# Patient Record
Sex: MALE | Race: WHITE | Employment: FULL TIME | URBAN - METROPOLITAN AREA
[De-identification: names, ages, dates, MRNs, and addresses within clinical notes are randomized per-mention and may not be internally consistent; named-entity substitution may affect disease eponyms.]

---

## 2020-06-12 ENCOUNTER — OFFICE VISIT (OUTPATIENT)
Dept: FAMILY MEDICINE CLINIC | Age: 40
End: 2020-06-12
Payer: COMMERCIAL

## 2020-06-12 VITALS
RESPIRATION RATE: 12 BRPM | SYSTOLIC BLOOD PRESSURE: 154 MMHG | WEIGHT: 241 LBS | HEART RATE: 88 BPM | TEMPERATURE: 96.8 F | HEIGHT: 70 IN | BODY MASS INDEX: 34.5 KG/M2 | DIASTOLIC BLOOD PRESSURE: 92 MMHG

## 2020-06-12 PROBLEM — I10 ESSENTIAL HYPERTENSION: Status: ACTIVE | Noted: 2020-06-12

## 2020-06-12 PROCEDURE — 99204 OFFICE O/P NEW MOD 45 MIN: CPT | Performed by: NURSE PRACTITIONER

## 2020-06-12 RX ORDER — HYDROXYZINE PAMOATE 25 MG/1
25 CAPSULE ORAL 3 TIMES DAILY PRN
Qty: 30 CAPSULE | Refills: 0 | Status: SHIPPED | OUTPATIENT
Start: 2020-06-12 | End: 2020-06-24 | Stop reason: SDUPTHER

## 2020-06-12 RX ORDER — LISINOPRIL 10 MG/1
10 TABLET ORAL DAILY
Qty: 30 TABLET | Refills: 3 | Status: SHIPPED | OUTPATIENT
Start: 2020-06-12 | End: 2020-09-09 | Stop reason: SDUPTHER

## 2020-06-12 RX ORDER — DULOXETIN HYDROCHLORIDE 30 MG/1
30 CAPSULE, DELAYED RELEASE ORAL DAILY
Qty: 30 CAPSULE | Refills: 1 | Status: SHIPPED | OUTPATIENT
Start: 2020-06-12 | End: 2020-07-10 | Stop reason: SINTOL

## 2020-06-12 RX ORDER — LISINOPRIL 10 MG/1
TABLET ORAL
COMMUNITY
Start: 2020-05-08 | End: 2020-06-12

## 2020-06-12 SDOH — HEALTH STABILITY: MENTAL HEALTH: HOW OFTEN DO YOU HAVE A DRINK CONTAINING ALCOHOL?: NEVER

## 2020-06-12 ASSESSMENT — ENCOUNTER SYMPTOMS
EYES NEGATIVE: 1
SHORTNESS OF BREATH: 0
BLOOD IN STOOL: 0
BACK PAIN: 1
CHEST TIGHTNESS: 0
ABDOMINAL PAIN: 0

## 2020-06-12 NOTE — PROGRESS NOTES
sounds: Normal breath sounds. Abdominal:      General: Bowel sounds are normal.      Palpations: Abdomen is soft. Musculoskeletal: Normal range of motion. Thoracic back: He exhibits tenderness. Lumbar back: He exhibits tenderness. Skin:     General: Skin is warm and dry. Neurological:      Mental Status: He is alert and oriented to person, place, and time. Deep Tendon Reflexes: Reflexes are normal and symmetric. Psychiatric:         Behavior: Behavior normal.         Thought Content: Thought content normal.         Judgment: Judgment normal.         There is no immunization history on file for this patient. Health Maintenance   Topic Date Due    Potassium monitoring  1981    Creatinine monitoring  1981    Varicella vaccine (1 of 2 - 2-dose childhood series) 07/24/1982    Pneumococcal 0-64 years Vaccine (1 of 1 - PPSV23) 07/24/1987    HIV screen  07/24/1996    DTaP/Tdap/Td vaccine (1 - Tdap) 07/24/2000    Flu vaccine (Season Ended) 09/01/2020    Hepatitis A vaccine  Aged Out    Hepatitis B vaccine  Aged Out    Hib vaccine  Aged Out    Meningococcal (ACWY) vaccine  Aged Out         ASSESSMENT      1. Essential hypertension    2. Lumbar pain    3. Thoracic spine pain    4. Depression, unspecified depression type    5. Anxiety    6. Screening, lipid    7.  Fatigue, unspecified type            PLAN       Requested Prescriptions     Signed Prescriptions Disp Refills    hydrOXYzine (VISTARIL) 25 MG capsule 30 capsule 0     Sig: Take 1 capsule by mouth 3 times daily as needed for Anxiety    DULoxetine (CYMBALTA) 30 MG extended release capsule 30 capsule 1     Sig: Take 1 capsule by mouth daily    lisinopril (PRINIVIL;ZESTRIL) 10 MG tablet 30 tablet 3     Sig: Take 1 tablet by mouth daily       Orders Placed This Encounter   Procedures    CBC Auto Differential     Standing Status:   Future     Standing Expiration Date:   6/12/2021    Basic Metabolic Panel     Standing

## 2020-06-12 NOTE — PATIENT INSTRUCTIONS
your doctor right away if you have sudden skin redness or a rash that spreads and causes white or yellow pustules, blistering, or peeling. Stop using hydroxyzine and call your doctor at once if you have:  · fast or pounding heartbeats;  · headache with chest pain;  · severe dizziness, fainting; or  · a seizure (convulsions). Side effects such as drowsiness and confusion may be more likely in older adults. Common side effects may include:  · drowsiness;  · headache;  · dry mouth; or  · skin rash. This is not a complete list of side effects and others may occur. Call your doctor for medical advice about side effects. You may report side effects to FDA at 5-547-SFF-1815. What other drugs will affect hydroxyzine? Taking this medicine with other drugs that make you sleepy can worsen this effect. Ask your doctor before taking hydroxyzine with a sleeping pill, narcotic pain medicine, muscle relaxer, or medicine for anxiety, depression, or seizures. Hydroxyzine can cause a serious heart problem, especially if you use certain medicines at the same time, including antibiotics, antidepressants, heart rhythm medicine, antipsychotic medicines, and medicines to treat cancer, malaria, HIV or AIDS. Tell your doctor about all medicines you use, and those you start or stop using during your treatment with hydroxyzine. Other drugs may interact with hydroxyzine, including prescription and over-the-counter medicines, vitamins, and herbal products. Not all possible interactions are listed here. Tell each of your health care providers about all medicines you use now and any medicine you start or stop using. Where can I get more information? Your pharmacist can provide more information about hydroxyzine. Remember, keep this and all other medicines out of the reach of children, never share your medicines with others, and use this medication only for the indication prescribed.    Every effort has been made to ensure that the

## 2020-06-17 ENCOUNTER — NURSE ONLY (OUTPATIENT)
Dept: LAB | Age: 40
End: 2020-06-17

## 2020-06-17 LAB
ANION GAP SERPL CALCULATED.3IONS-SCNC: 11 MEQ/L (ref 8–16)
BASOPHILS # BLD: 0.9 %
BASOPHILS ABSOLUTE: 0.1 THOU/MM3 (ref 0–0.1)
BUN BLDV-MCNC: 13 MG/DL (ref 7–22)
CALCIUM SERPL-MCNC: 8.8 MG/DL (ref 8.5–10.5)
CHLORIDE BLD-SCNC: 102 MEQ/L (ref 98–111)
CHOLESTEROL, TOTAL: 226 MG/DL (ref 100–199)
CO2: 25 MEQ/L (ref 23–33)
CREAT SERPL-MCNC: 1.1 MG/DL (ref 0.4–1.2)
EOSINOPHIL # BLD: 3.5 %
EOSINOPHILS ABSOLUTE: 0.5 THOU/MM3 (ref 0–0.4)
ERYTHROCYTE [DISTWIDTH] IN BLOOD BY AUTOMATED COUNT: 14.7 % (ref 11.5–14.5)
ERYTHROCYTE [DISTWIDTH] IN BLOOD BY AUTOMATED COUNT: 49 FL (ref 35–45)
GFR SERPL CREATININE-BSD FRML MDRD: 75 ML/MIN/1.73M2
GLUCOSE BLD-MCNC: 87 MG/DL (ref 70–108)
HCT VFR BLD CALC: 50.2 % (ref 42–52)
HDLC SERPL-MCNC: 46 MG/DL
HEMOGLOBIN: 16.2 GM/DL (ref 14–18)
IMMATURE GRANS (ABS): 0.04 THOU/MM3 (ref 0–0.07)
IMMATURE GRANULOCYTES: 0.3 %
LDL CHOLESTEROL CALCULATED: 135 MG/DL
LYMPHOCYTES # BLD: 31.5 %
LYMPHOCYTES ABSOLUTE: 4.3 THOU/MM3 (ref 1–4.8)
MCH RBC QN AUTO: 29.1 PG (ref 26–33)
MCHC RBC AUTO-ENTMCNC: 32.3 GM/DL (ref 32.2–35.5)
MCV RBC AUTO: 90.1 FL (ref 80–94)
MONOCYTES # BLD: 7.1 %
MONOCYTES ABSOLUTE: 1 THOU/MM3 (ref 0.4–1.3)
NUCLEATED RED BLOOD CELLS: 0 /100 WBC
PLATELET # BLD: 320 THOU/MM3 (ref 130–400)
PMV BLD AUTO: 10.3 FL (ref 9.4–12.4)
POTASSIUM SERPL-SCNC: 4.1 MEQ/L (ref 3.5–5.2)
RBC # BLD: 5.57 MILL/MM3 (ref 4.7–6.1)
SEG NEUTROPHILS: 56.7 %
SEGMENTED NEUTROPHILS ABSOLUTE COUNT: 7.7 THOU/MM3 (ref 1.8–7.7)
SODIUM BLD-SCNC: 138 MEQ/L (ref 135–145)
T4 FREE: 1.28 NG/DL (ref 0.93–1.76)
TRIGL SERPL-MCNC: 225 MG/DL (ref 0–199)
TSH SERPL DL<=0.05 MIU/L-ACNC: 2.54 UIU/ML (ref 0.4–4.2)
WBC # BLD: 13.5 THOU/MM3 (ref 4.8–10.8)

## 2020-06-18 ENCOUNTER — TELEPHONE (OUTPATIENT)
Dept: FAMILY MEDICINE CLINIC | Age: 40
End: 2020-06-18

## 2020-06-22 RX ORDER — HYDROXYZINE PAMOATE 25 MG/1
25 CAPSULE ORAL 3 TIMES DAILY PRN
Qty: 30 CAPSULE | Refills: 0 | OUTPATIENT
Start: 2020-06-22 | End: 2020-07-06

## 2020-06-22 NOTE — TELEPHONE ENCOUNTER
Please double check that patient needs a refill. I sent this in just 10 days ago. He can take it up to 3 times daily, but if he is actually needing it 3 times every day, we need to get him on something that will help control it better.  Thanks, TS

## 2020-06-23 ENCOUNTER — PATIENT MESSAGE (OUTPATIENT)
Dept: FAMILY MEDICINE CLINIC | Age: 40
End: 2020-06-23

## 2020-06-24 RX ORDER — HYDROXYZINE PAMOATE 25 MG/1
25 CAPSULE ORAL 3 TIMES DAILY PRN
Qty: 30 CAPSULE | Refills: 0 | Status: SHIPPED | OUTPATIENT
Start: 2020-06-24 | End: 2020-07-08

## 2020-07-02 ENCOUNTER — NURSE ONLY (OUTPATIENT)
Dept: LAB | Age: 40
End: 2020-07-02

## 2020-07-02 LAB
BASOPHILS # BLD: 1.1 %
BASOPHILS ABSOLUTE: 0.1 THOU/MM3 (ref 0–0.1)
EOSINOPHIL # BLD: 4.4 %
EOSINOPHILS ABSOLUTE: 0.5 THOU/MM3 (ref 0–0.4)
ERYTHROCYTE [DISTWIDTH] IN BLOOD BY AUTOMATED COUNT: 14.6 % (ref 11.5–14.5)
ERYTHROCYTE [DISTWIDTH] IN BLOOD BY AUTOMATED COUNT: 48.7 FL (ref 35–45)
HCT VFR BLD CALC: 50.9 % (ref 42–52)
HEMOGLOBIN: 16.1 GM/DL (ref 14–18)
IMMATURE GRANS (ABS): 0.05 THOU/MM3 (ref 0–0.07)
IMMATURE GRANULOCYTES: 0.4 %
LYMPHOCYTES # BLD: 30.3 %
LYMPHOCYTES ABSOLUTE: 3.7 THOU/MM3 (ref 1–4.8)
MCH RBC QN AUTO: 28.9 PG (ref 26–33)
MCHC RBC AUTO-ENTMCNC: 31.6 GM/DL (ref 32.2–35.5)
MCV RBC AUTO: 91.2 FL (ref 80–94)
MONOCYTES # BLD: 7.8 %
MONOCYTES ABSOLUTE: 1 THOU/MM3 (ref 0.4–1.3)
NUCLEATED RED BLOOD CELLS: 0 /100 WBC
PLATELET # BLD: 335 THOU/MM3 (ref 130–400)
PMV BLD AUTO: 9.9 FL (ref 9.4–12.4)
RBC # BLD: 5.58 MILL/MM3 (ref 4.7–6.1)
SEG NEUTROPHILS: 56 %
SEGMENTED NEUTROPHILS ABSOLUTE COUNT: 6.8 THOU/MM3 (ref 1.8–7.7)
WBC # BLD: 12.2 THOU/MM3 (ref 4.8–10.8)

## 2020-07-06 RX ORDER — LISINOPRIL 10 MG/1
10 TABLET ORAL DAILY
Qty: 30 TABLET | Refills: 3 | Status: CANCELLED | OUTPATIENT
Start: 2020-07-06

## 2020-07-06 RX ORDER — DULOXETIN HYDROCHLORIDE 30 MG/1
30 CAPSULE, DELAYED RELEASE ORAL DAILY
Qty: 30 CAPSULE | Refills: 1 | Status: CANCELLED | OUTPATIENT
Start: 2020-07-06

## 2020-07-06 NOTE — TELEPHONE ENCOUNTER
Request sent via [facility] for refill on cymbalta 30 mg qd and lisinopril 10 mg qd. Last seen 6/12/20, next apt 7/10/20. Message sent back to patient via [facility] to let him know that he should have refills on both at the pharmacy.

## 2020-07-07 ENCOUNTER — TELEPHONE (OUTPATIENT)
Dept: FAMILY MEDICINE CLINIC | Age: 40
End: 2020-07-07

## 2020-07-10 ENCOUNTER — VIRTUAL VISIT (OUTPATIENT)
Dept: FAMILY MEDICINE CLINIC | Age: 40
End: 2020-07-10
Payer: COMMERCIAL

## 2020-07-10 PROCEDURE — 99213 OFFICE O/P EST LOW 20 MIN: CPT | Performed by: NURSE PRACTITIONER

## 2020-07-10 RX ORDER — PAROXETINE HYDROCHLORIDE 20 MG/1
20 TABLET, FILM COATED ORAL DAILY
Qty: 30 TABLET | Refills: 1 | Status: SHIPPED | OUTPATIENT
Start: 2020-07-10 | End: 2020-08-13

## 2020-07-10 ASSESSMENT — ENCOUNTER SYMPTOMS
BACK PAIN: 1
SHORTNESS OF BREATH: 0
BLOOD IN STOOL: 0
EYES NEGATIVE: 1
CHEST TIGHTNESS: 0
ABDOMINAL PAIN: 0

## 2020-07-10 NOTE — PATIENT INSTRUCTIONS
Weaning Cymbalta: take 1 capsule every other day x1 week, then every 3 days x1 week, then stop. Okay to start Paxil right away. Patient Education        paroxetine  Pronunciation:  glenn GORE a teen  Brand:  Brisdelle, Paxil, Rina MAHAN, Kenney Jacobson  What is the most important information I should know about paroxetine? You should not use paroxetine if you are also taking pimozide or thioridazine. Do not use paroxetine within 14 days before or 14 days after you have used an MAO inhibitor, such as isocarboxazid, linezolid, methylene blue injection, phenelzine, rasagiline, selegiline, or tranylcypromine. Some young people have thoughts about suicide when first taking an antidepressant. Stay alert to changes in your mood or symptoms. Report any new or worsening symptoms to your doctor  Seek medical attention right away if you have symptoms such as: agitation, hallucinations, muscle stiffness, twitching, loss of coordination, dizziness, warmth or tingly feeling, nausea, vomiting, diarrhea, fever, sweating, tremors, racing heartbeats, or a seizure (convulsions). What is paroxetine? Paroxetine is a selective serotonin reuptake inhibitor (SSRI) antidepressant. Paroxetine is used to treat depression, including major depressive disorder. Paroxtine is also used to treat panic disorder, obsessive-compulsive disorder (OCD), anxiety disorders, post-traumatic stress disorder (PTSD), and premenstrual dysphoric disorder (PMDD). The Brisdelle brand of paroxetine is used to treat hot flashes related to menopause. Tyrone Elizabeth is not for treating any other conditions. Paroxetine may also be used for purposes not listed in this medication guide. What should I discuss with my healthcare provider before taking paroxetine? You should not use this medicine if you are allergic to paroxetine, or if you are also taking pimozide or thioridazine. Do not use an MAO inhibitor within 14 days before or 14 days after you take paroxetine.  A break it. Shake the oral suspension (liquid) before you measure a dose. Use the dosing syringe provided, or use a medicine dose-measuring device (not a kitchen spoon). It may take up to 4 weeks before your symptoms improve. Keep using the medication as directed and tell your doctor if your symptoms do not improve. Do not stop using paroxetine suddenly, or you could have unpleasant withdrawal symptoms. Ask your doctor how to safely stop using paroxetine. Follow your doctor's instructions about tapering your dose. Store at room temperature away from moisture, heat, and light. What happens if I miss a dose? Take the medicine as soon as you can, but skip the missed dose if it is almost time for your next dose. Do not take two doses at one time. What happens if I overdose? Seek emergency medical attention or call the Poison Help line at 1-263.133.9086. An overdose of paroxetine can be fatal.  What should I avoid while taking paroxetine? Avoid driving or hazardous activity until you know how this medicine will affect you. Your reactions could be impaired. Ask your doctor before taking a nonsteroidal anti-inflammatory drug (NSAID) such as aspirin, ibuprofen (Advil, Motrin), naproxen (Aleve), celecoxib (Celebrex), diclofenac, indomethacin, meloxicam, and others. Using an NSAID with paroxetine may cause you to bruise or bleed easily. Drinking alcohol with this medicine can cause side effects. What are the possible side effects of paroxetine? Get emergency medical help if you have signs of an allergic reaction (hives, difficult breathing, swelling in your face or throat) or a severe skin reaction (fever, sore throat, burning eyes, skin pain, red or purple skin rash with blistering and peeling).   Report any new or worsening symptoms to your doctor, such as: mood or behavior changes, anxiety, panic attacks, trouble sleeping, or if you feel impulsive, irritable, agitated, hostile, aggressive, restless, hyperactive (mentally or physically), more depressed, or have thoughts about suicide or hurting yourself. Call your doctor at once if you have:  · racing thoughts, decreased need for sleep, unusual risk-taking behavior, feelings of extreme happiness or sadness, being more talkative than usual;  · blurred vision, tunnel vision, eye pain or swelling, or seeing halos around lights;  · unusual bone pain or tenderness, swelling or bruising;  · changes in weight or appetite;  · easy bruising, unusual bleeding (nose, mouth, vagina, or rectum), coughing up blood;  · severe nervous system reaction --very stiff (rigid) muscles, high fever, sweating, confusion, fast or uneven heartbeats, tremors, fainting; or  · low levels of sodium in the body --headache, confusion, slurred speech, severe weakness, loss of coordination, feeling unsteady. Seek medical attention right away if you have symptoms of serotonin syndrome, such as: agitation, hallucinations, fever, sweating, shivering, fast heart rate, muscle stiffness, twitching, loss of coordination, nausea, vomiting, or diarrhea. Common side effects may include:  · vision changes;  · weakness, drowsiness, dizziness, tiredness;  · sweating, anxiety, shaking;  · sleep problems (insomnia);  · loss of appetite, nausea, vomiting, diarrhea, constipation;  · dry mouth, yawning;  · infection;  · headache; or  · decreased sex drive, impotence, abnormal ejaculation, or difficulty having an orgasm. This is not a complete list of side effects and others may occur. Call your doctor for medical advice about side effects. You may report side effects to FDA at 5-399-FDA-3990. What other drugs will affect paroxetine? Using paroxetine with other drugs that make you drowsy can worsen this effect. Ask your doctor before using opioid medication, a sleeping pill, a muscle relaxer, or medicine for anxiety or seizures.   Tell your doctor about all your other medicines, especially:  · cimetidine (Tagamet), as a supplement to, and not a substitute for, the expertise, skill, knowledge and judgment of healthcare practitioners. The absence of a warning for a given drug or drug combination in no way should be construed to indicate that the drug or drug combination is safe, effective or appropriate for any given patient. [facility] does not assume any responsibility for any aspect of healthcare administered with the aid of information [facility] provides. The information contained herein is not intended to cover all possible uses, directions, precautions, warnings, drug interactions, allergic reactions, or adverse effects. If you have questions about the drugs you are taking, check with your doctor, nurse or pharmacist.  Copyright 4295-0439 [address]: 27.01. Revision date: 9/19/2019. Care instructions adapted under license by [facility] ([facility]). If you have questions about a medical condition or this instruction, always ask your healthcare professional. [place] [address] any warranty or liability for your use of this information.

## 2020-07-10 NOTE — PROGRESS NOTES
[facility]  [facility]  Dept: [phone]  Dept Fax: [phone]      TELEHEALTH EVALUATION -- Audio/Visual (During DOJVO-27 public health emergency)  This visit was performed via a synchronous telecommunication system. Pt location: Home  Provider location:  Office ([address] [place] [address])  Pt notified that this was a virtual visit and that we will submit a claim for reimbursement to their insurance company. They were made aware that any copays, coinsurance amounts, or other amounts not covered will be their responsibility. Pt accepted? yes    SUBJECTIVE     Sarah Brown is a 46 y.o. male    Pt presents for follow up of htn, depression/anxiety, back pain. Home BP's are monitored about twice daily. Running 130's/70's    Pt was referred to [facility] for chronic back pain. He was told he has DDD by ortho. He will get an MRI on 7/14/20. Pt is here for depression. Pt currently taking Cymbalta and vistaril. Side effects noted: he does have nausea most mornings. He does not feel like the Cymbalta has helped at all. The Vistaril does offer some anxiety relief. Patient Active Problem List   Diagnosis    Essential hypertension       Current Outpatient Medications   Medication Sig Dispense Refill    PARoxetine (PAXIL) 20 MG tablet Take 1 tablet by mouth daily 30 tablet 1    lisinopril (PRINIVIL;ZESTRIL) 10 MG tablet Take 1 tablet by mouth daily 30 tablet 3     No current facility-administered medications for this visit. Review of Systems   Constitutional: Negative for chills, fatigue, fever and unexpected weight change. HENT: Negative. Eyes: Negative. Respiratory: Negative for chest tightness and shortness of breath. Cardiovascular: Negative for chest pain, palpitations and leg swelling. Gastrointestinal: Negative for abdominal pain and blood in stool. Genitourinary: Negative for dysuria. Musculoskeletal: Positive for back pain.  Negative for joint swelling. Skin: Negative for rash. Neurological: Negative for dizziness. Psychiatric/Behavioral: Positive for dysphoric mood. The patient is nervous/anxious. All other systems reviewed and are negative. OBJECTIVE     Due to this being a TeleHealth encounter, evaluation of the following organ systems is limited: Vitals/Constitutional/EENT/Resp/CV/GI//MS/Neuro/Skin/Heme-Lymph-Imm. PHYSICAL EXAMINATION:  [ INSTRUCTIONS:  \"[x]\" Indicates a positive item  \"[]\" Indicates a negative item  -- DELETE ALL ITEMS NOT EXAMINED]  Vital Signs: (All Vital Signs are pt reported, unless otherwise noted)   There were no vitals taken for this visit. Constitutional: [x] Appears well-developed and well-nourished [x] No apparent distress      [] Abnormal-   Mental status  [x] Alert and awake  [x] Oriented to person/place/time [x]Able to follow commands      Eyes:  EOM    [x]  Normal  [] Abnormal-  Sclera  [x]  Normal  [] Abnormal -         Discharge [x]  None visible  [] Abnormal -    HENT:   [x] Normocephalic, atraumatic.   [] Abnormal   [x] Mouth/Throat: Mucous membranes are moist.     External Ears [x] Normal  [] Abnormal-     Neck: [x] No visualized mass     Pulmonary/Chest: [x] Respiratory effort normal.  [x] No visualized signs of difficulty breathing or respiratory distress        [] Abnormal-      Musculoskeletal:   [x] Normal gait with no signs of ataxia         [x] Normal range of motion of neck        [] Abnormal-       Neurological:        [x] No Facial Asymmetry (Cranial nerve 7 motor function) (limited exam to video visit)          [x] No gaze palsy        [] Abnormal-         Skin:        [x] No significant exanthematous lesions or discoloration noted on facial skin         [] Abnormal-            Psychiatric:       [x] Normal Affect [x] No Hallucinations        [] Abnormal-       Component      Latest Ref Rng & Units 7/2/2020 6/17/2020   WBC      4.8 - 10.8 thou/mm3 12.2 (H) 13.5 (H)   RBC 4.70 - 6.10 mill/mm3 5.58 5.57   Hemoglobin Quant      14.0 - 18.0 gm/dl 16.1 16.2   Hematocrit      42.0 - 52.0 % 50.9 50.2   MCV      80.0 - 94.0 fL 91.2 90.1   MCH      26.0 - 33.0 pg 28.9 29.1   MCHC      32.2 - 35.5 gm/dl 31.6 (L) 32.3   RDW-CV      11.5 - 14.5 % 14.6 (H) 14.7 (H)   RDW-SD      35.0 - 45.0 fL 48.7 (H) 49.0 (H)   Platelet Count      470 - 400 thou/mm3 335 320   MPV      9.4 - 12.4 fL 9.9 10.3   Seg Neutrophils      % 56.0 56.7   Lymphocytes      % 30.3 31.5   Monocytes      % 7.8 7.1   Eosinophils      % 4.4 3.5   Basophils      % 1.1 0.9   Immature Granulocytes      % 0.4 0.3   Segs Absolute      1.8 - 7.7 thou/mm3 6.8 7.7   Lymphocytes Absolute      1.0 - 4.8 thou/mm3 3.7 4.3   Monocytes Absolute      0.4 - 1.3 thou/mm3 1.0 1.0   Eosinophils Absolute      0.0 - 0.4 thou/mm3 0.5 (H) 0.5 (H)   Basophils Absolute      0.0 - 0.1 thou/mm3 0.1 0.1   Immature Grans (Abs)      0.00 - 0.07 thou/mm3 0.05 0.04   Nucleated Red Blood Cells      /100 wbc 0 0   Sodium      135 - 145 meq/L  138   Potassium      3.5 - 5.2 meq/L  4.1   Chloride      98 - 111 meq/L  102   CO2      23 - 33 meq/L  25   Glucose      70 - 108 mg/dL  87   BUN      7 - 22 mg/dL  13   Creatinine      0.4 - 1.2 mg/dL  1.1   Calcium      8.5 - 10.5 mg/dL  8.8   Cholesterol, Total      100 - 199 mg/dL  226 (H)   Triglycerides      0 - 199 mg/dL  225 (H)   HDL Cholesterol      mg/dL  46   LDL Calculated      mg/dL  135   TSH      0.400 - 4.20 uIU/mL  2.540   T4 Free      0.93 - 1.76 ng/dL  1.28   Anion Gap      8.0 - 16.0 meq/L  11.0   Est, Glom Filt Rate      ml/min/1.73m2  75 (A)       No results found for: LABA1C  No results found for: EAG    Lab Results   Component Value Date    CHOL 226 (H) 06/17/2020    TRIG 225 (H) 06/17/2020    HDL 46 06/17/2020    LDLCALC 135 06/17/2020       Lab Results   Component Value Date     06/17/2020    K 4.1 06/17/2020     06/17/2020    CO2 25 06/17/2020    BUN 13 06/17/2020    CREATININE 1.1 06/17/2020    GLUCOSE 87 06/17/2020    CALCIUM 8.8 06/17/2020    LABGLOM 75 (A) 06/17/2020       No results found for: LABMICR, [id number]    Lab Results   Component Value Date    TSH 2.540 06/17/2020    T4FREE 1.28 06/17/2020       Lab Results   Component Value Date    WBC 12.2 (H) 07/02/2020    HGB 16.1 07/02/2020    HCT 50.9 07/02/2020    MCV 91.2 07/02/2020     07/02/2020       No results found for: PSA, PSADIA        There is no immunization history on file for this patient. Health Maintenance   Topic Date Due    Varicella vaccine (1 of 2 - 2-dose childhood series) 07/24/1982    Pneumococcal 0-64 years Vaccine (1 of 1 - PPSV23) 07/24/1987    HIV screen  07/24/1996    DTaP/Tdap/Td vaccine (1 - Tdap) 07/24/2000    Flu vaccine (1) 09/01/2020    Potassium monitoring  06/17/2021    Creatinine monitoring  06/17/2021    Hepatitis A vaccine  Aged Out    Hepatitis B vaccine  Aged Out    Hib vaccine  Aged Out    Meningococcal (ACWY) vaccine  Aged Out         No future appointments. ASSESSMENT       Diagnosis Orders   1. Essential hypertension     2. Anxiety     3. Depression, unspecified depression type         PLAN     Will wean Cymbalta  Start Paxil 20 mg daily  CBC to be completed in 3 months   Pt to follow up with Ortho as scheduled  Monitor BP and keep log. Notify office if BP consistently >140/90  Follow up in 4-6 weeks  19}    Pursuant to the emergency declaration under the [facility] waiver authority and the [facility] and [employer] Act, this Virtual  Visit was conducted, with patient's consent, to reduce the patient's risk of exposure to COVID-19 and provide continuity of care for an established patient. Services were provided through a video synchronous discussion virtually to substitute for in-person clinic visit.        Electronically signed by Michalene Ganser, APRN - CNP on 7/10/2020 at 4:15 PM    [place]

## 2020-07-21 ENCOUNTER — PATIENT MESSAGE (OUTPATIENT)
Dept: FAMILY MEDICINE CLINIC | Age: 40
End: 2020-07-21

## 2020-07-21 RX ORDER — NAPROXEN 500 MG/1
500 TABLET ORAL 2 TIMES DAILY WITH MEALS
Qty: 60 TABLET | Refills: 1 | Status: SHIPPED | OUTPATIENT
Start: 2020-07-21 | End: 2020-09-09 | Stop reason: SDUPTHER

## 2020-07-21 RX ORDER — HYDROXYZINE PAMOATE 50 MG/1
50 CAPSULE ORAL 3 TIMES DAILY PRN
Qty: 30 CAPSULE | Refills: 1 | Status: SHIPPED | OUTPATIENT
Start: 2020-07-21 | End: 2020-08-12

## 2020-07-21 NOTE — TELEPHONE ENCOUNTER
From: Leticia Alaniz  To: David Bee, APRN - CNP  Sent: 7/21/2020 9:46 AM EDT  Subject: Prescription Question    Good morning,  I was wanting to see if there is a different medication we can try for the anxiety and depression or possibly a stronger dosage. Im not sure how all of that works but thaw new medication doesn't seem to help me much. Also the orthopedic doctor was supposed to schedule me an appointment to get some type of injections but I'm struggling with my day to day pain now. Is there anything you can suggest or prescribe until I get set up for these injections or procedures.    Thank you for your time and may you have a blessed day

## 2020-07-21 NOTE — TELEPHONE ENCOUNTER
Brendon,    I believe you were just switched to Paxil on 7/10/20. It does take about 4-6 weeks to get full effects of the medication. Are you starting to notice any difference yet? We can switch to another medication if you like, but it will be the same 4-6 week window to get full effects. I will increase your vistaril from 25 mg to 50 mg as needed. Hopefully this will help until the other medication can fully kick in. If you decide to stick with the Paxil, update me on how you are feeling in about 2 weeks. For your pain, let me send in Naprosyn 500 mg twice daily as needed for pain. Do not take any Aleve or ibuprofen while taking Naprosyn, but tylenol would be fine. Medications were sent to [facility] on [address], [address]. Have a great day!     Clover Wall

## 2020-07-27 ENCOUNTER — TELEPHONE (OUTPATIENT)
Dept: PHYSICAL MEDICINE AND REHAB | Age: 40
End: 2020-07-27

## 2020-07-28 ENCOUNTER — TELEMEDICINE (OUTPATIENT)
Dept: PHYSICAL MEDICINE AND REHAB | Age: 40
End: 2020-07-28
Payer: COMMERCIAL

## 2020-07-28 PROCEDURE — 99203 OFFICE O/P NEW LOW 30 MIN: CPT | Performed by: PHYSICAL MEDICINE & REHABILITATION

## 2020-07-28 NOTE — PROGRESS NOTES
Pain Management Consultation    [address] [facility] [address]  [facility] [address]  Dept: [phone]  [facility] [address] [place] [address], [address] [place] [address]       Emily Scanlon is a 45 y.o. male, who came today due to  back pain    Cause of the symptom(s): home injury - fell off the roof     Onset: chronic     Quality of Symptoms: stabbing , aching and throbbing    Aggravating factors: twisting and bending forward , sneezing    Relieving factors: hot shower     Treatment done: acupuncture, massotherapy, physical therapy, anti-inflammatory medication and muscle relaxant    Current pain level: 7 on the scale of 0-10 ( 10 being worst)       No Known Allergies    Social History     Socioeconomic History    Marital status:      Spouse name: Not on file    Number of children: Not on file    Years of education: Not on file    Highest education level: Not on file   Occupational History    Not on file   Social Needs    Financial resource strain: Not on file    Food insecurity     Worry: Not on file     Inability: Not on file    Transportation needs     Medical: Not on file     Non-medical: Not on file   Tobacco Use    Smoking status: Current Every Day Smoker     Packs/day: 1.00     Types: Cigarettes    Smokeless tobacco: Never Used   Substance and Sexual Activity    Alcohol use: Never     Frequency: Never    Drug use: Never    Sexual activity: Not on file   Lifestyle    Physical activity     Days per week: Not on file     Minutes per session: Not on file    Stress: Not on file   Relationships    Social connections     Talks on phone: Not on file     Gets together: Not on file     Attends [employer]ist service: Not on file     Active member of club or organization: Not on file     Attends meetings of clubs or organizations: Not on file     Relationship status: Not on file   Lucita Barrera Intimate partner violence     Fear of current or ex partner: Not on file     Emotionally abused: Not on file     Physically abused: Not on file     Forced sexual activity: Not on file   Other Topics Concern    Not on file   Social History Narrative    Not on file       Past Medical History:   Diagnosis Date    History of high blood pressure     Hypertension        No past surgical history on file. Prior to Visit Medications    Medication Sig Taking? Authorizing Provider   hydrOXYzine (VISTARIL) 50 MG capsule Take 1 capsule by mouth 3 times daily as needed for Anxiety  MATY Reinoso CNP   naproxen (NAPROSYN) 500 MG tablet Take 1 tablet by mouth 2 times daily (with meals)  MATY Reinoso CNP   PARoxetine (PAXIL) 20 MG tablet Take 1 tablet by mouth daily  MATY Reinoso CNP   lisinopril (PRINIVIL;ZESTRIL) 10 MG tablet Take 1 tablet by mouth daily  MATY Reinoso CNP       No family history on file. Review of Systems : All systems reviewed, all unremarkable other than HPI/subjective. No change since last visit. Denies  fever, chills, infection or non healing wound. Physical Exam  Constitutional:       General: He is not in acute distress. Appearance: Normal appearance. He is not ill-appearing. HENT:      Head: Atraumatic. Pulmonary:      Effort: Pulmonary effort is normal. No respiratory distress. Neurological:      Mental Status: He is oriented to person, place, and time. Psychiatric:         Behavior: Behavior normal.         Assessment and Plan:      Diagnosis Orders   1. Lumbar spondylosis  MI INJ DX/THER AGNT PARAVERT FACET JOINT, LUMBAR/SAC, 1ST LEVEL    MI INJ DX/THER AGNT PARAVERT FACET JOINT, LUMBAR/SAC, 2ND LEVEL       He reports axial pain than radicular pain. Will consider lumbar medial branch block.      He tried acupuncture, massotherapy, physical therapy, anti-inflammatory medication and muscle relaxant    Schedule for bilateral L3-4 medial branch and L5 dorsal rami injeciton. Schedule injection in 2 weeks     Schedule ff up post injection virtually 2 weeks post op       I have reviewed the chief complaint and HPI including the [facility] and Vital documentation by my staff and I agree with their documentation and have added where applicable. Time spent with patient was 30 minutes. More than 50% was spent counseling/coordinating the patient's care.        Sachin Coley MD   Spine Medicine/PM&R

## 2020-08-12 RX ORDER — HYDROXYZINE PAMOATE 50 MG/1
CAPSULE ORAL
Qty: 90 CAPSULE | Refills: 0 | Status: SHIPPED | OUTPATIENT
Start: 2020-08-12 | End: 2020-09-09

## 2020-08-12 NOTE — TELEPHONE ENCOUNTER
Medication sent. Pt needs to follow up within the next month to see how he is doing with the Paxil. Okay for VV if he prefers.  TS

## 2020-08-13 RX ORDER — PAROXETINE HYDROCHLORIDE 20 MG/1
20 TABLET, FILM COATED ORAL DAILY
Qty: 30 TABLET | Refills: 5 | Status: SHIPPED | OUTPATIENT
Start: 2020-08-13 | End: 2020-09-09 | Stop reason: SDUPTHER

## 2020-09-09 RX ORDER — HYDROXYZINE PAMOATE 50 MG/1
CAPSULE ORAL
Qty: 90 CAPSULE | Refills: 0 | Status: CANCELLED | OUTPATIENT
Start: 2020-09-09

## 2020-09-09 RX ORDER — PAROXETINE HYDROCHLORIDE 20 MG/1
20 TABLET, FILM COATED ORAL DAILY
Qty: 90 TABLET | Refills: 3 | Status: SHIPPED | OUTPATIENT
Start: 2020-09-09 | End: 2021-01-11 | Stop reason: SDUPTHER

## 2020-09-09 RX ORDER — LISINOPRIL 10 MG/1
10 TABLET ORAL DAILY
Qty: 90 TABLET | Refills: 3 | Status: SHIPPED | OUTPATIENT
Start: 2020-09-09 | End: 2020-10-28 | Stop reason: SDUPTHER

## 2020-09-09 RX ORDER — HYDROXYZINE PAMOATE 50 MG/1
CAPSULE ORAL
Qty: 90 CAPSULE | Refills: 0 | Status: SHIPPED | OUTPATIENT
Start: 2020-09-09 | End: 2020-10-21 | Stop reason: SDUPTHER

## 2020-09-09 RX ORDER — NAPROXEN 500 MG/1
500 TABLET ORAL 2 TIMES DAILY WITH MEALS
Qty: 60 TABLET | Refills: 1 | Status: SHIPPED | OUTPATIENT
Start: 2020-09-09 | End: 2020-10-01

## 2020-09-11 ENCOUNTER — TELEPHONE (OUTPATIENT)
Dept: PHYSICAL MEDICINE AND REHAB | Age: 40
End: 2020-09-11

## 2020-09-11 NOTE — TELEPHONE ENCOUNTER
Pt LM stating he needed to cancel procedure due to not having transportation. Procedure and follow up cancelled. LM for him to call when he can r/s or to schedule follow up with Dr. Christa Adam.

## 2020-09-25 RX ORDER — MELOXICAM 15 MG/1
15 TABLET ORAL DAILY
Qty: 30 TABLET | Refills: 0 | Status: SHIPPED | OUTPATIENT
Start: 2020-09-25 | End: 2020-10-22

## 2020-09-25 RX ORDER — BACLOFEN 10 MG/1
10 TABLET ORAL 3 TIMES DAILY
Qty: 90 TABLET | Refills: 0 | Status: SHIPPED | OUTPATIENT
Start: 2020-09-25 | End: 2020-10-21 | Stop reason: SDUPTHER

## 2020-09-28 ENCOUNTER — PATIENT MESSAGE (OUTPATIENT)
Dept: FAMILY MEDICINE CLINIC | Age: 40
End: 2020-09-28

## 2020-09-28 NOTE — TELEPHONE ENCOUNTER
No future appointments. Recent Visits  Date Type Provider Dept   06/12/20 Office Visit Dragan Quiñones, APRN - CNP [facility]   Showing recent visits within past 540 days with a meds authorizing provider and meeting all other requirements     Future Appointments  No visits were found meeting these conditions. Showing future appointments within next 150 days with a meds authorizing provider and meeting all other requirements       Would you like to see him for an evaluation?

## 2020-09-30 ENCOUNTER — PATIENT MESSAGE (OUTPATIENT)
Dept: PHYSICAL MEDICINE AND REHAB | Age: 40
End: 2020-09-30

## 2020-09-30 NOTE — TELEPHONE ENCOUNTER
From: Jeff Collazo  To: Franklyn Eddy MD  Sent: 9/30/2020 4:08 AM EDT  Subject: Prescription Question    My previous doctor had me on this medication and it really helped me get through my day to day. Is there a possibility we could try this along with the injections. I know there is some big deal about this medication which I don't understand because I took it as prescribed and it really helped me be able to maintain my working abilities and home life. Just thought I would see if this could be a possibility over trying a bunch of other medications when I know what can help. I have been given all kinds of medicine like the ones you have given me as well as the gabapentin and these didn't seem to help me with my day to day required activities. Im not trying to cause any problems or question you in any way , like I said just thought I would discuss this with you.  Thank you for your time and may you have a great rest of your week

## 2020-10-01 ENCOUNTER — PATIENT MESSAGE (OUTPATIENT)
Dept: FAMILY MEDICINE CLINIC | Age: 40
End: 2020-10-01

## 2020-10-01 ENCOUNTER — OFFICE VISIT (OUTPATIENT)
Dept: FAMILY MEDICINE CLINIC | Age: 40
End: 2020-10-01
Payer: COMMERCIAL

## 2020-10-01 VITALS
TEMPERATURE: 96.6 F | WEIGHT: 240.6 LBS | SYSTOLIC BLOOD PRESSURE: 122 MMHG | RESPIRATION RATE: 16 BRPM | HEART RATE: 84 BPM | BODY MASS INDEX: 34.52 KG/M2 | DIASTOLIC BLOOD PRESSURE: 80 MMHG

## 2020-10-01 PROCEDURE — 99213 OFFICE O/P EST LOW 20 MIN: CPT | Performed by: NURSE PRACTITIONER

## 2020-10-01 RX ORDER — BUPROPION HYDROCHLORIDE 150 MG/1
150 TABLET ORAL EVERY MORNING
Qty: 30 TABLET | Refills: 1 | Status: SHIPPED | OUTPATIENT
Start: 2020-10-01 | End: 2020-11-18 | Stop reason: SDUPTHER

## 2020-10-01 NOTE — PROGRESS NOTES
Chief Complaint   Patient presents with    Discuss Medications     pt feels like the meds is not helping         SUBJECTIVE     Lucille Delgado is a 45 y.o.male        Pt is here for depression. Pt currently taking Paxil 20 mg. Side effects noted: none  Pt states the Paxil is not helping at all. He initially thought it was, but not any longer. He continues to feel depressed and anxious. He is also on hydroxyzine, but states he is unsure it is helping. Pt has chronic back pain. He is currently following with pain management. He is suppose to get a procedure but canceled as he did not have a ride. Sleep- \"I get what I get\"  Interest- No interest  Guilt- OK  Energy- decreased  Concentration- decreased  Appetite- decreased  Psychomotor Retardation- NO  Suicidal/ Homicidal Ideations- Denies  Anxiety-\"Here and there, not every day\"      Pt has been monitoring BP at home. Staying below 140/90 per patient. Review of Systems   Constitutional: Negative for chills, fatigue, fever and unexpected weight change. HENT: Negative. Eyes: Negative. Respiratory: Negative for chest tightness and shortness of breath. Cardiovascular: Negative for chest pain, palpitations and leg swelling. Gastrointestinal: Negative for abdominal pain and blood in stool. Genitourinary: Negative for dysuria. Musculoskeletal: Positive for back pain (chronic). Negative for joint swelling. Skin: Negative for rash. Neurological: Negative for dizziness. Psychiatric/Behavioral: Positive for decreased concentration and dysphoric mood. The patient is nervous/anxious. All other systems reviewed and are negative.         OBJECTIVE     /80 (Site: Left Upper Arm)   Pulse 84   Temp 96.6 °F (35.9 °C) (Temporal)   Resp 16   Wt 240 lb 9.6 oz (109.1 kg)   BMI 34.52 kg/m²   Wt Readings from Last 3 Encounters:   10/01/20 240 lb 9.6 oz (109.1 kg)   06/12/20 241 lb (109.3 kg)       Physical Exam  Vitals signs and nursing note reviewed. Constitutional:       Appearance: He is well-developed. HENT:      Head: Normocephalic and atraumatic. Right Ear: External ear normal.      Left Ear: External ear normal.      Nose: Nose normal.   Eyes:      Conjunctiva/sclera: Conjunctivae normal.      Pupils: Pupils are equal, round, and reactive to light. Neck:      Musculoskeletal: Normal range of motion and neck supple. Cardiovascular:      Rate and Rhythm: Normal rate and regular rhythm. Pulmonary:      Effort: Pulmonary effort is normal.      Breath sounds: Normal breath sounds. Abdominal:      General: Bowel sounds are normal.      Palpations: Abdomen is soft. Musculoskeletal: Normal range of motion. Skin:     General: Skin is warm and dry. Neurological:      Mental Status: He is alert and oriented to person, place, and time. Deep Tendon Reflexes: Reflexes are normal and symmetric. Psychiatric:         Mood and Affect: Affect is flat. Behavior: Behavior normal.         Thought Content: Thought content normal.         Judgment: Judgment normal.         No results found for this visit on 10/01/20. ASSESSMENT       Diagnosis Orders   1. Depression, unspecified depression type     2. Essential hypertension     3. Anxiety     4.  Lumbar pain         PLAN     Requested Prescriptions     Signed Prescriptions Disp Refills    buPROPion (WELLBUTRIN XL) 150 MG extended release tablet 30 tablet 1     Sig: Take 1 tablet by mouth every morning     Will stop Paxil, patient giving instructions on weaning  Will start Wellbutrin XL daily  Pt to continue with pain management  Follow up in 4-6 weeks    Electronically signed by MATY Landon CNP on 10/2/2020 at 9:44 AM

## 2020-10-01 NOTE — TELEPHONE ENCOUNTER
From: Merlene Reeys  To:  MATY Shoemaker CNP  Sent: 10/1/2020 12:45 PM EDT  Subject: Non-Urgent Medical Question    I woke a little late but I am on my way and I'm not far

## 2020-10-02 ASSESSMENT — ENCOUNTER SYMPTOMS
BACK PAIN: 1
SHORTNESS OF BREATH: 0
CHEST TIGHTNESS: 0
EYES NEGATIVE: 1
BLOOD IN STOOL: 0
ABDOMINAL PAIN: 0

## 2020-10-10 ENCOUNTER — PATIENT MESSAGE (OUTPATIENT)
Dept: PHYSICAL MEDICINE AND REHAB | Age: 40
End: 2020-10-10

## 2020-10-12 NOTE — TELEPHONE ENCOUNTER
Patient asking for medrol pack and Tramadol and UDS is back that patient did. If okay please sign tramadol and medrol pack. Please advise and route back. OARRS reviewed. UDS: + for  Mitragynine. Last seen: 7/28/2020. Follow-up: No future appointments.

## 2020-10-12 NOTE — TELEPHONE ENCOUNTER
From: Phillip Chavez  Sent: 10/12/2020 10:41 AM EDT  To: [facility]  Subject: RE: Prescription Question    My apologies I'm a little bit confused or not understanding but ,The Madrol dose pack and Tramadol that Dr. Adelfo Silveira said for me to come in and do a urine test for.     ----- Message -----  From: Nurse Zeke Connelly  Sent: 10/12/20, 8:04 AM  To: Phillip Chavez  Subject: RE: Prescription Question    Lowella Dago,   Which medication are you talking about? Thanks,  Tom Limon      ----- Message -----   From:Hector Foster   Sent:10/10/2020 10:05 PM EDT   To:Merlin Sands MD   Subject:Prescription Question    Good evening I am reaching out in regards to the other medication Dr Adelfo Silveira suggested. Is that something I will need to  from there or do y'all call it in ? Thank you for your time       ----- Message -----   From:Nurse Emily BRYANT   Sent:10/7/2020 8:11 AM EDT   To:Hector Fofana   Subject:RE: Prescription Question    [facility]   [place] [address], [address], [place], [address]. Thanks,  Tom Limon      ----- Message -----   From:Hector Foster   Sent:10/7/2020 8:10 AM EDT   To:Merlin Sands MD   Subject:RE: Prescription Question    Can you please send me the address of where I need to come to this afternoon       ----- Message -----   From:Nurse Emily BRYANT   Sent:10/6/2020 2:05 PM EDT   To:Hector Mendiola   Subject:RE: Prescription Question    [facility] we will see you then. Thanks,  Tom Limon      ----- Message -----   From:Hector Foster   Sent:10/6/2020 1:46 PM EDT    To:Merlin Sands MD   Subject:RE: Prescription Question    I have fit test scheduled today before work so I can do it before work tomorrow       ----- Message -----   From:Nurse Emily BRYANT   Sent:10/5/2020 10:38 AM EDT   To:Hector Foster   Subject:RE: Prescription Question    Jasbir Husain,   Dr. Adelfo Silveira says \"I will not prescribe long term opioid therapy at this time.  Continue Mobic and Baclofen for now. If he is in severe pain - consider Medrol dose pack. Short course tramadol 50 mg TID prn x 7 days for one time if he agrees. Needs urine drug screen for me to even consider 1 week supply\". Let me know if you would like the medrol pack sent in for you it will help with any inflammation or swelling going on. If you would like the Tramadol I will need you to come in and do a urine drug screen in the office before we send it in. Thanks,  Rosette Williamson      ----- Message -----   From:Hector Foster   Sent:10/5/2020 10:33 AM EDT   To:Merlin Chan MD   Subject:RE: Prescription Question    Good morning   I hadn't received anything back and I was supposed to respond this week about how the medication prescribed was helping and I don't feel it has helped me at all. I understand y'all are very busy and are trying to help me and I really just need to find something that will help. My pain has only increased and is affecting my daily performance at work and my mental state. Can you help me please. I am trying to find someone who will be able to bring me to the procedure and bring me back as I do not have any friends or family here yet but I have a co worker who is supposed to return towards the end of this month and hopefully we can get something scheduled. Thank you for your time and I do really appreciate your help. May y'all have a blessed day       ----- Message -----   From:Nurse Michelle BRYANT   Sent:9/30/2020 7:58 AM EDT   To:Hector Foster   Subject:RE: Prescription Question    Carissa Dyson,   I will send this to Dr. Lavonne Varma. I will let you know once he answers. Thanks,  Rosette Williamson      ----- Message -----   From:Hector Bravo   Sent:9/30/2020 4:08 AM EDT   To:Merlin Chan MD   Subject:Prescription Question    My previous doctor had me on this medication and it really helped me get through my day to day. Is there a possibility we could try this along with the injections.  I know there is some big deal about this medication which I don't understand because I took it as prescribed and it really helped me be able to maintain my working abilities and home life. Just thought I would see if this could be a possibility over trying a bunch of other medications when I know what can help. I have been given all kinds of medicine like the ones you have given me as well as the gabapentin and these didn't seem to help me with my day to day required activities. Im not trying to cause any problems or question you in any way , like I said just thought I would discuss this with you.  Thank you for your time and may you have a great rest of your week

## 2020-10-20 ENCOUNTER — TELEMEDICINE (OUTPATIENT)
Dept: PHYSICAL MEDICINE AND REHAB | Age: 40
End: 2020-10-20
Payer: COMMERCIAL

## 2020-10-20 PROCEDURE — 99214 OFFICE O/P EST MOD 30 MIN: CPT | Performed by: PHYSICAL MEDICINE & REHABILITATION

## 2020-10-20 RX ORDER — METHYLPREDNISOLONE 4 MG/1
TABLET ORAL
Qty: 1 KIT | Refills: 0 | Status: SHIPPED | OUTPATIENT
Start: 2020-10-20 | End: 2020-10-26

## 2020-10-20 NOTE — PROGRESS NOTES
FOLLOW UP APPOINTMENT: VIRTUAL VISIT         Sheyla Castaneda MD    10/20/2020       Jermaine Rodriguez is a 45 y.o. male, who came for a  follow up to discuss treatment options    Cause of the symptom(s): degenerative (arthritis)    Onset: chronic     Prior reatment done: physical therapy, injection, anti-inflammatory medication and muscle relaxant    Current pain level: 9 on the scale of 0-10 ( 10 being worst)     Quality of Symptoms: stabbing     Aggravating factors: activity    Relieving factors: rest and lying down    Of note, he also reports numbness in the hands.      No Known Allergies    Social History     Socioeconomic History    Marital status:      Spouse name: Not on file    Number of children: Not on file    Years of education: Not on file    Highest education level: Not on file   Occupational History    Not on file   Social Needs    Financial resource strain: Not on file    Food insecurity     Worry: Not on file     Inability: Not on file    Transportation needs     Medical: Not on file     Non-medical: Not on file   Tobacco Use    Smoking status: Current Every Day Smoker     Packs/day: 1.00     Types: Cigarettes    Smokeless tobacco: Never Used   Substance and Sexual Activity    Alcohol use: Never     Frequency: Never    Drug use: Never    Sexual activity: Not on file   Lifestyle    Physical activity     Days per week: Not on file     Minutes per session: Not on file    Stress: Not on file   Relationships    Social connections     Talks on phone: Not on file     Gets together: Not on file     Attends Sikh service: Not on file     Active member of club or organization: Not on file     Attends meetings of clubs or organizations: Not on file     Relationship status: Not on file    Intimate partner violence     Fear of current or ex partner: Not on file     Emotionally abused: Not on file     Physically abused: Not on file     Forced sexual activity: Not on file   Other virtually 2 weeks post op     Radha Ayoub is a 45 y.o. male being evaluated by a Virtual Visit (video visit) encounter to address concerns as mentioned above. A caregiver was present when appropriate. Due to this being a TeleHealth encounter (During QUOAD-97 public health emergency), evaluation of the following organ systems was limited: Vitals/Constitutional/EENT/Resp/CV/GI//MS/Neuro/Skin/Heme-Lymph-Imm. Pursuant to the emergency declaration under the [address] [place] [address], [address] [place] [address] authority and the [employer] Act, this Virtual Visit was conducted with patient's (and/or legal guardian's) consent, to reduce the patient's risk of exposure to COVID-19 and provide necessary medical care. The patient (and/or legal guardian) has also been advised to contact this office for worsening conditions or problems, and seek emergency medical treatment and/or call 911 if deemed necessary. Patient identification was verified at the start of the visit: Yes    Total time spent for this encounter: Not billed by time    Services were provided through a video synchronous discussion virtually to substitute for in-person clinic visit. Patient and provider were located at their individual homes. --Michael Granda MD on 10/21/2020 at 2:38 PM    An electronic signature was used to authenticate this note.

## 2020-10-21 ENCOUNTER — TELEPHONE (OUTPATIENT)
Dept: PHYSICAL MEDICINE AND REHAB | Age: 40
End: 2020-10-21

## 2020-10-21 RX ORDER — BUPROPION HYDROCHLORIDE 150 MG/1
150 TABLET ORAL EVERY MORNING
Qty: 30 TABLET | Refills: 1 | Status: CANCELLED | OUTPATIENT
Start: 2020-10-21

## 2020-10-22 RX ORDER — BACLOFEN 10 MG/1
10 TABLET ORAL 3 TIMES DAILY
Qty: 90 TABLET | Refills: 0 | Status: ON HOLD | OUTPATIENT
Start: 2020-10-25 | End: 2020-11-18

## 2020-10-22 RX ORDER — HYDROXYZINE PAMOATE 50 MG/1
50 CAPSULE ORAL 3 TIMES DAILY PRN
Qty: 90 CAPSULE | Refills: 0 | Status: SHIPPED | OUTPATIENT
Start: 2020-10-22 | End: 2020-11-18 | Stop reason: SDUPTHER

## 2020-10-22 RX ORDER — BACLOFEN 10 MG/1
TABLET ORAL
Qty: 90 TABLET | Refills: 0 | Status: SHIPPED | OUTPATIENT
Start: 2020-10-22 | End: 2020-11-18 | Stop reason: SDUPTHER

## 2020-10-22 RX ORDER — MELOXICAM 15 MG/1
15 TABLET ORAL DAILY
Qty: 30 TABLET | Refills: 0 | Status: SHIPPED | OUTPATIENT
Start: 2020-10-22 | End: 2020-11-18 | Stop reason: SDUPTHER

## 2020-10-23 RX ORDER — METHYLPREDNISOLONE 4 MG/1
TABLET ORAL
Qty: 1 KIT | Refills: 0 | Status: ON HOLD | OUTPATIENT
Start: 2020-10-23 | End: 2020-11-18

## 2020-10-23 RX ORDER — TRAMADOL HYDROCHLORIDE 50 MG/1
50 TABLET ORAL 3 TIMES DAILY PRN
Qty: 21 TABLET | Refills: 0 | Status: SHIPPED | OUTPATIENT
Start: 2020-10-23 | End: 2020-10-30

## 2020-10-23 NOTE — TELEPHONE ENCOUNTER
Pt. Contacted. Procedure and follow up scheduled. Educated on pre-procedure instructions, also mailed. Verbalized understanding.

## 2020-10-26 NOTE — TELEPHONE ENCOUNTER
Patient was already in for UDS + for Mitragynine. Did you need him to come in for another? Please advise and route back.

## 2020-10-28 RX ORDER — LISINOPRIL 10 MG/1
10 TABLET ORAL DAILY
Qty: 90 TABLET | Refills: 3 | Status: SHIPPED | OUTPATIENT
Start: 2020-10-28 | End: 2021-08-20 | Stop reason: SDUPTHER

## 2020-10-28 NOTE — TELEPHONE ENCOUNTER
This medication refill is regarding a refill  Refill requested by [address]  Requested Prescriptions     Pending Prescriptions Disp Refills    lisinopril (PRINIVIL;ZESTRIL) 10 MG tablet 90 tablet 3     Sig: Take 1 tablet by mouth daily       Date of last visit: 10/1/2020  Date of next visit: Visit date not found  Date of last refill: 9/9/20  Pharmacy Name: [id number] [facility]

## 2020-10-28 NOTE — TELEPHONE ENCOUNTER
OARRS reviewed. UDS: + for  Kratom- inconsistent     Last seen: 10/20/2020.      Follow-up:   Future Appointments   Date Time Provider Nick Marcella   12/2/2020  3:30 PM Lore Newby MD [facility] [address] [facility]

## 2020-10-29 ENCOUNTER — TELEPHONE (OUTPATIENT)
Dept: PHYSICAL MEDICINE AND REHAB | Age: 40
End: 2020-10-29

## 2020-10-29 ENCOUNTER — CLINICAL DOCUMENTATION (OUTPATIENT)
Dept: PHYSICAL MEDICINE AND REHAB | Age: 40
End: 2020-10-29

## 2020-10-29 RX ORDER — TRAMADOL HYDROCHLORIDE 50 MG/1
50 TABLET ORAL 3 TIMES DAILY PRN
Qty: 21 TABLET | Refills: 0 | OUTPATIENT
Start: 2020-10-29 | End: 2020-11-05

## 2020-10-29 NOTE — TELEPHONE ENCOUNTER
He is positive for kratom, which, in and of itself, an addictive pain killer. Combining that with tramadol might be very dangerous. If he tested negative of UDS on 2 occasions 4 weeks apart, I will re -prescribe tramadol.

## 2020-10-29 NOTE — TELEPHONE ENCOUNTER
Spoke via phone in regards to UDS+ Kratom. Explained office policy to not prescribe narcotics/pain medications with kratom due to not being FDA regulated and unsafe combination. Instructed to come to office for another UDS X2 4 weeks apart Monday-Thursday [id number] negative of Kratom. Verbalized understanding. States he will see how procedure makes him feel 11/18/2020 with Dr Arcelia Bettencourt and follow up after that.

## 2020-11-04 RX ORDER — NAPROXEN 500 MG/1
500 TABLET ORAL 2 TIMES DAILY WITH MEALS
Qty: 60 TABLET | Refills: 1 | Status: ON HOLD | OUTPATIENT
Start: 2020-11-04 | End: 2020-11-18 | Stop reason: HOSPADM

## 2020-11-04 NOTE — TELEPHONE ENCOUNTER
This medication refill is regarding a refill    Refill requested by [address] [place] [address]     Requested Prescriptions     Pending Prescriptions Disp Refills    naproxen (NAPROSYN) 500 MG tablet 60 tablet 1     Sig: Take 1 tablet by mouth 2 times daily (with meals)       Date of last visit: 10/1/2020  Date of next visit: Visit date not found  Date of last refill: 9/9/20  Pharmacy Name: [address] [place] [address]

## 2020-11-10 ENCOUNTER — VIRTUAL VISIT (OUTPATIENT)
Dept: PHYSICAL MEDICINE AND REHAB | Age: 40
End: 2020-11-10
Payer: COMMERCIAL

## 2020-11-10 PROCEDURE — 99214 OFFICE O/P EST MOD 30 MIN: CPT | Performed by: PHYSICAL MEDICINE & REHABILITATION

## 2020-11-10 RX ORDER — TRAMADOL HYDROCHLORIDE 50 MG/1
50 TABLET ORAL EVERY 4 HOURS PRN
Qty: 18 TABLET | Refills: 0 | Status: SHIPPED | OUTPATIENT
Start: 2020-11-10 | End: 2020-11-13

## 2020-11-10 RX ORDER — METHYLPREDNISOLONE 4 MG/1
TABLET ORAL
Qty: 1 KIT | Refills: 0 | Status: SHIPPED | OUTPATIENT
Start: 2020-11-10 | End: 2020-11-16

## 2020-11-10 RX ORDER — CYCLOBENZAPRINE HCL 10 MG
10 TABLET ORAL 3 TIMES DAILY PRN
Qty: 21 TABLET | Refills: 0 | Status: SHIPPED | OUTPATIENT
Start: 2020-11-10 | End: 2020-11-18 | Stop reason: SDUPTHER

## 2020-11-10 NOTE — PROGRESS NOTES
FOLLOW UP APPOINTMENT: VIRTUAL VISIT        Marcellus Price MD    11/10/2020       Phillip Chavez is a 45 y.o. male, who came for a  follow up to discuss treatment options    Cause of the symptom(s): degenerative (arthritis)    Onset: acute episode on chronic back pain     Aching, throbbing pain. Increase pain with axial rotation. Patient in severe pain.        No Known Allergies    Social History     Socioeconomic History    Marital status:      Spouse name: Not on file    Number of children: Not on file    Years of education: Not on file    Highest education level: Not on file   Occupational History    Not on file   Social Needs    Financial resource strain: Not on file    Food insecurity     Worry: Not on file     Inability: Not on file    Transportation needs     Medical: Not on file     Non-medical: Not on file   Tobacco Use    Smoking status: Current Every Day Smoker     Packs/day: 1.00     Types: Cigarettes    Smokeless tobacco: Never Used   Substance and Sexual Activity    Alcohol use: Never     Frequency: Never    Drug use: Never    Sexual activity: Not on file   Lifestyle    Physical activity     Days per week: Not on file     Minutes per session: Not on file    Stress: Not on file   Relationships    Social connections     Talks on phone: Not on file     Gets together: Not on file     Attends Mor[employer] service: Not on file     Active member of club or organization: Not on file     Attends meetings of clubs or organizations: Not on file     Relationship status: Not on file    Intimate partner violence     Fear of current or ex partner: Not on file     Emotionally abused: Not on file     Physically abused: Not on file     Forced sexual activity: Not on file   Other Topics Concern    Not on file   Social History Narrative    Not on file       Past Medical History:   Diagnosis Date    History of high blood pressure     Hypertension        No past surgical history on file. No family history on file. Prior to Visit Medications    Medication Sig Taking? Authorizing Provider   methylPREDNISolone (MEDROL DOSEPACK) 4 MG tablet Take by mouth. Yes Suzanna Rubi MD   cyclobenzaprine (FLEXERIL) 10 MG tablet Take 1 tablet by mouth 3 times daily as needed for Muscle spasms Yes Suzanna Rubi MD   traMADol (ULTRAM) 50 MG tablet Take 1 tablet by mouth every 4 hours as needed for Pain for up to 3 days. Intended supply: 3 days. Take lowest dose possible to manage pain Yes Suzanna Rubi MD   naproxen (NAPROSYN) 500 MG tablet Take 1 tablet by mouth 2 times daily (with meals)  MATY Chandra CNP   lisinopril (PRINIVIL;ZESTRIL) 10 MG tablet Take 1 tablet by mouth daily  MATY Chandra CNP   methylPREDNISolone (MEDROL DOSEPACK) 4 MG tablet Take by mouth. Suzanna Rubi MD   baclofen (LIORESAL) 10 MG tablet Take 1 tablet by mouth 3 times daily  Suzanna Rubi MD   hydrOXYzine (VISTARIL) 50 MG capsule Take 1 capsule by mouth 3 times daily as needed for Anxiety  MATY Chandra CNP   baclofen (LIORESAL) 10 MG tablet TAKE 1 TABLET BY MOUTH THREE TIMES DAILY  Suzanna Rubi MD   meloxicam (MOBIC) 15 MG tablet Take 1 tablet by mouth daily  Suzanna Rubi MD   buPROPion (WELLBUTRIN XL) 150 MG extended release tablet Take 1 tablet by mouth every morning  MATY Chandra CNP   PARoxetine (PAXIL) 20 MG tablet Take 1 tablet by mouth daily  Emerson Cohen, MATY Dobson CNP         Review of Systems : All systems reviewed, all unremarkable other than HPI/subjective. No change since last visit. Denies  fever, chills, infection or non healing wound. There were no vitals taken for this visit. Physical Exam  HENT:      Head: Atraumatic. Pulmonary:      Effort: Pulmonary effort is normal. No respiratory distress. Neurological:      Mental Status: He is alert and oriented to person, place, and time.    Psychiatric:

## 2020-11-16 RX ORDER — BUPROPION HYDROCHLORIDE 150 MG/1
150 TABLET ORAL EVERY MORNING
Qty: 90 TABLET | Refills: 3 | OUTPATIENT
Start: 2020-11-16

## 2020-11-16 RX ORDER — HYDROXYZINE PAMOATE 50 MG/1
50 CAPSULE ORAL 3 TIMES DAILY PRN
Qty: 90 CAPSULE | Refills: 0 | OUTPATIENT
Start: 2020-11-16

## 2020-11-18 ENCOUNTER — PATIENT MESSAGE (OUTPATIENT)
Dept: PHYSICAL MEDICINE AND REHAB | Age: 40
End: 2020-11-18

## 2020-11-18 ENCOUNTER — APPOINTMENT (OUTPATIENT)
Dept: GENERAL RADIOLOGY | Age: 40
End: 2020-11-18
Attending: PHYSICAL MEDICINE & REHABILITATION
Payer: COMMERCIAL

## 2020-11-18 ENCOUNTER — HOSPITAL ENCOUNTER (OUTPATIENT)
Age: 40
Setting detail: OUTPATIENT SURGERY
Discharge: HOME | End: 2020-11-18
Attending: PHYSICAL MEDICINE & REHABILITATION | Admitting: PHYSICAL MEDICINE & REHABILITATION
Payer: COMMERCIAL

## 2020-11-18 VITALS
RESPIRATION RATE: 16 BRPM | HEART RATE: 87 BPM | BODY MASS INDEX: 34.36 KG/M2 | TEMPERATURE: 96.9 F | DIASTOLIC BLOOD PRESSURE: 72 MMHG | SYSTOLIC BLOOD PRESSURE: 124 MMHG | OXYGEN SATURATION: 93 % | WEIGHT: 240 LBS | HEIGHT: 70 IN

## 2020-11-18 PROCEDURE — 3600000055 HC PAIN LEVEL 3 ADDL 15 MIN: Performed by: PHYSICAL MEDICINE & REHABILITATION

## 2020-11-18 PROCEDURE — 3600000054 HC PAIN LEVEL 3 BASE: Performed by: PHYSICAL MEDICINE & REHABILITATION

## 2020-11-18 PROCEDURE — 3209999900 FLUORO FOR SURGICAL PROCEDURES

## 2020-11-18 PROCEDURE — 2500000003 HC RX 250 WO HCPCS: Performed by: PHYSICAL MEDICINE & REHABILITATION

## 2020-11-18 PROCEDURE — 64493 INJ PARAVERT F JNT L/S 1 LEV: CPT | Performed by: PHYSICAL MEDICINE & REHABILITATION

## 2020-11-18 PROCEDURE — 64494 INJ PARAVERT F JNT L/S 2 LEV: CPT | Performed by: PHYSICAL MEDICINE & REHABILITATION

## 2020-11-18 PROCEDURE — 7100000011 HC PHASE II RECOVERY - ADDTL 15 MIN: Performed by: PHYSICAL MEDICINE & REHABILITATION

## 2020-11-18 PROCEDURE — 2709999900 HC NON-CHARGEABLE SUPPLY: Performed by: PHYSICAL MEDICINE & REHABILITATION

## 2020-11-18 PROCEDURE — 7100000010 HC PHASE II RECOVERY - FIRST 15 MIN: Performed by: PHYSICAL MEDICINE & REHABILITATION

## 2020-11-18 PROCEDURE — 6360000002 HC RX W HCPCS: Performed by: PHYSICAL MEDICINE & REHABILITATION

## 2020-11-18 PROCEDURE — 99152 MOD SED SAME PHYS/QHP 5/>YRS: CPT | Performed by: PHYSICAL MEDICINE & REHABILITATION

## 2020-11-18 RX ORDER — LISINOPRIL 10 MG/1
10 TABLET ORAL DAILY
Qty: 90 TABLET | Refills: 3 | Status: CANCELLED | OUTPATIENT
Start: 2020-11-18

## 2020-11-18 RX ORDER — METHYLPREDNISOLONE ACETATE 80 MG/ML
INJECTION, SUSPENSION INTRA-ARTICULAR; INTRALESIONAL; INTRAMUSCULAR; SOFT TISSUE PRN
Status: DISCONTINUED | OUTPATIENT
Start: 2020-11-18 | End: 2020-11-18 | Stop reason: ALTCHOICE

## 2020-11-18 RX ORDER — FENTANYL CITRATE 50 UG/ML
INJECTION, SOLUTION INTRAMUSCULAR; INTRAVENOUS PRN
Status: DISCONTINUED | OUTPATIENT
Start: 2020-11-18 | End: 2020-11-18 | Stop reason: ALTCHOICE

## 2020-11-18 RX ORDER — LIDOCAINE HYDROCHLORIDE 10 MG/ML
INJECTION, SOLUTION EPIDURAL; INFILTRATION; INTRACAUDAL; PERINEURAL PRN
Status: DISCONTINUED | OUTPATIENT
Start: 2020-11-18 | End: 2020-11-18 | Stop reason: ALTCHOICE

## 2020-11-18 RX ORDER — MIDAZOLAM HYDROCHLORIDE 1 MG/ML
INJECTION INTRAMUSCULAR; INTRAVENOUS PRN
Status: DISCONTINUED | OUTPATIENT
Start: 2020-11-18 | End: 2020-11-18 | Stop reason: ALTCHOICE

## 2020-11-18 ASSESSMENT — PAIN DESCRIPTION - LOCATION
LOCATION: BACK
LOCATION: BACK

## 2020-11-18 ASSESSMENT — PAIN SCALES - GENERAL
PAINLEVEL_OUTOF10: 6
PAINLEVEL_OUTOF10: 8

## 2020-11-18 ASSESSMENT — PAIN DESCRIPTION - DIRECTION: RADIATING_TOWARDS: MID

## 2020-11-18 ASSESSMENT — PAIN DESCRIPTION - PAIN TYPE
TYPE: CHRONIC PAIN
TYPE: CHRONIC PAIN

## 2020-11-18 ASSESSMENT — PAIN DESCRIPTION - ORIENTATION: ORIENTATION: LOWER

## 2020-11-18 NOTE — H&P
Cheyenne Jones MD      Emanuel Webster is a 45 y.o. male, who came in for a procedure,  bilateral L3-4 medial branch and L5 doral rami injection. No change since last visit      Treatment done: physical therapy, anti-inflammatory medication and muscle relaxant    No Known Allergies    Social History     Socioeconomic History    Marital status:      Spouse name: Not on file    Number of children: Not on file    Years of education: Not on file    Highest education level: Not on file   Occupational History    Not on file   Social Needs    Financial resource strain: Not on file    Food insecurity     Worry: Not on file     Inability: Not on file    Transportation needs     Medical: Not on file     Non-medical: Not on file   Tobacco Use    Smoking status: Current Every Day Smoker     Packs/day: 1.00     Types: Cigarettes    Smokeless tobacco: Never Used   Substance and Sexual Activity    Alcohol use: Never     Frequency: Never    Drug use: Never    Sexual activity: Not on file   Lifestyle    Physical activity     Days per week: Not on file     Minutes per session: Not on file    Stress: Not on file   Relationships    Social connections     Talks on phone: Not on file     Gets together: Not on file     Attends Jain service: Not on file     Active member of club or organization: Not on file     Attends meetings of clubs or organizations: Not on file     Relationship status: Not on file    Intimate partner violence     Fear of current or ex partner: Not on file     Emotionally abused: Not on file     Physically abused: Not on file     Forced sexual activity: Not on file   Other Topics Concern    Not on file   Social History Narrative    Not on file       Past Medical History:   Diagnosis Date    History of high blood pressure     Hypertension        History reviewed. No pertinent surgical history. Prior to Visit Medications    Medication Sig Taking?  Authorizing Provider lisinopril (PRINIVIL;ZESTRIL) 10 MG tablet Take 1 tablet by mouth daily Yes MATY Couch CNP   PARoxetine (PAXIL) 20 MG tablet Take 1 tablet by mouth daily Yes MATY Couch CNP   cyclobenzaprine (FLEXERIL) 10 MG tablet Take 1 tablet by mouth 3 times daily as needed for Muscle spasms  Young Rodriguez MD   naproxen (NAPROSYN) 500 MG tablet Take 1 tablet by mouth 2 times daily (with meals)  MATY Couch CNP   hydrOXYzine (VISTARIL) 50 MG capsule Take 1 capsule by mouth 3 times daily as needed for Anxiety  MATY Couch CNP   baclofen (LIORESAL) 10 MG tablet TAKE 1 TABLET BY MOUTH THREE TIMES DAILY  Young Rodriguez MD   meloxicam (MOBIC) 15 MG tablet Take 1 tablet by mouth daily  Young Rodriguez MD   buPROPion (WELLBUTRIN XL) 150 MG extended release tablet Take 1 tablet by mouth every morning  MATY Couch CNP         Review of Systems : All systems reviewed, all unremarkable other than HPI. No change since last visit. Physical Exam  Constitutional:       General: He is not in acute distress. HENT:      Head: Atraumatic. Cardiovascular:      Rate and Rhythm: Normal rate. Pulses: Normal pulses. Pulmonary:      Effort: Pulmonary effort is normal. No respiratory distress. Neurological:      Mental Status: He is alert and oriented to person, place, and time. Psychiatric:         Behavior: Behavior normal.     :    Cervical Spine Exam: Full ROM, without limitation     Access: Adequate mouth opening       Assessment:   Chronic low back pain     PLAN:     As advertised. Planned duration of treatment: 4 weeks. Further assessment and followup in  4 weeks for follow up post injection.        Electronically signed by Young Rodriguez MD on 11/18/2020 at 10:04 AM

## 2020-11-18 NOTE — OP NOTE
Operative Note      Patient: Opal Barbour  YOB: 1980  MRN: [id number]    Date of Procedure: 11/18/2020    Pre-Op Diagnosis: Lumbar spondylosis    Post-Op Diagnosis: Same       Procedure(s):  L3, L4 medial branch and L5 dorsal rami injection, bilateral    Surgeon(s):  Cornelio Anderson MD    Anesthesia: Moderate sedation using 2 mg IV versed & 100 mcg IV fentanyl. CONSENT:     The risks, benefits, alternatives, plan, complications, and personnel were discussed prior to the procedure. The patient understood and agreed to proceed. The patient was positioned prone. Sign-in and time-out was performed. Identifying the   site, in this case, both sides L3 L4 medial branches and L5 dorsal ramus/rami  to address the L4-5 and L5-S1 joints     Sterile technique was done in the usual manner using chlorhexidine. This was followed by infiltration of a 16 ml of 1% preservative-free lidocaine. A 3.5 inch  22-gauge spinal needle(s)  were positioned under fluoroscopic guidance. A total of  6 needles were positioned. Upon confirmation that the needle was properly positioned, a solution of 40 mg of methylprednisolone and  6 ml of 1% preservative-free lidocaine was injected without difficulty. The patient tolerated the procedure well and went to the recovery room with stable vital signs. Estimated Blood Loss (mL): Minimal    Complications: None    Moderate Sedation Time: > 15 minutes    PLAN:     Home instructions were provided. The patient will follow up in 4 to 6 weeks or earlier if needed.       Electronically signed by Cornelio Anderson MD on 11/18/2020 at 10:24 AM

## 2020-11-18 NOTE — PROGRESS NOTES
1026: patient to phase II. Awake and oriented. VSS. 1030: Pt given snack and drink. Call light provided. 1039: IV removed. Patient wants to finish snack before calling ride and getting dressed. 1045: ride called. Pt getting dressed. 1053: pt states that ride is at work and will let us know when he is here. 1059: Patient meets discharge criteria. Walked out to car in stable condition with this RN.

## 2020-11-19 RX ORDER — BUPROPION HYDROCHLORIDE 150 MG/1
150 TABLET ORAL EVERY MORNING
Qty: 30 TABLET | Refills: 1 | Status: SHIPPED | OUTPATIENT
Start: 2020-11-19 | End: 2021-01-11 | Stop reason: SDUPTHER

## 2020-11-19 RX ORDER — HYDROXYZINE PAMOATE 50 MG/1
50 CAPSULE ORAL 3 TIMES DAILY PRN
Qty: 90 CAPSULE | Refills: 0 | Status: SHIPPED | OUTPATIENT
Start: 2020-11-19 | End: 2020-12-14 | Stop reason: SDUPTHER

## 2020-11-19 NOTE — TELEPHONE ENCOUNTER
How do you want the patient taking the Tramadol? Last order was for every 4 hours and before that was every 8 hours. Please advise and route back.

## 2020-11-19 NOTE — TELEPHONE ENCOUNTER
Refills sent. I would like to either do a VV or in office visit soon to see how he is feeling on the Wellbutrin.  Thanks, TS

## 2020-11-24 RX ORDER — CYCLOBENZAPRINE HCL 10 MG
10 TABLET ORAL 3 TIMES DAILY PRN
Qty: 21 TABLET | Refills: 0 | Status: SHIPPED | OUTPATIENT
Start: 2020-11-24 | End: 2020-12-02

## 2020-11-24 RX ORDER — MELOXICAM 15 MG/1
15 TABLET ORAL DAILY
Qty: 30 TABLET | Refills: 0 | Status: SHIPPED | OUTPATIENT
Start: 2020-11-24 | End: 2020-12-14 | Stop reason: SDUPTHER

## 2020-11-24 RX ORDER — TRAMADOL HYDROCHLORIDE 50 MG/1
50 TABLET ORAL EVERY 6 HOURS PRN
Qty: 20 TABLET | Refills: 0 | Status: SHIPPED | OUTPATIENT
Start: 2020-11-24 | End: 2020-11-29

## 2020-11-24 RX ORDER — BACLOFEN 10 MG/1
10 TABLET ORAL 3 TIMES DAILY
Qty: 90 TABLET | Refills: 0 | Status: SHIPPED | OUTPATIENT
Start: 2020-11-24 | End: 2020-12-14 | Stop reason: SDUPTHER

## 2020-12-01 ENCOUNTER — TELEPHONE (OUTPATIENT)
Dept: PHYSICAL MEDICINE AND REHAB | Age: 40
End: 2020-12-01

## 2020-12-01 NOTE — TELEPHONE ENCOUNTER
Called patient to confirm his appointment tomorrow with Dr. Dwayne Gomez. No answer and voice mailbox is full.

## 2020-12-02 ENCOUNTER — VIRTUAL VISIT (OUTPATIENT)
Dept: PHYSICAL MEDICINE AND REHAB | Age: 40
End: 2020-12-02
Payer: COMMERCIAL

## 2020-12-02 PROCEDURE — 99441 PR PHYS/QHP TELEPHONE EVALUATION 5-10 MIN: CPT | Performed by: PHYSICAL MEDICINE & REHABILITATION

## 2020-12-02 NOTE — PROGRESS NOTES
Minda Blackwell is a 45 y.o. male evaluated via telephone on 12/2/2020. Consent:  He and/or health care decision maker is aware that that he may receive a bill for this telephone service, depending on his insurance coverage, and has provided verbal consent to proceed: Yes      Documentation:  I communicated with the patient and/or health care decision maker about post injection . Details of this discussion including any medical advice provided:     Had bilateral L3-4 medial branch and L5 dorsal rami injection. Had at least 50% relief. Pain level is 6/10 pain. Had been prescribed with tramadol. Continue baclofen and mobic. Hold off further injection at this time. If the pain is worse, will repeat the injection. I affirm this is a Patient Initiated Episode with a Patient who has not had a related appointment within my department in the past 7 days or scheduled within the next 24 hours.     Patient identification was verified at the start of the visit: Yes    Total Time: minutes: 5-10 minutes    Note: not billable if this call serves to triage the patient into an appointment for the relevant concern      Carlos Alberto Hugo

## 2020-12-06 ENCOUNTER — PATIENT MESSAGE (OUTPATIENT)
Dept: PHYSICAL MEDICINE AND REHAB | Age: 40
End: 2020-12-06

## 2020-12-07 NOTE — TELEPHONE ENCOUNTER
From: Rylee Gasca  To: Tiffany Reinoso MD  Sent: 12/6/2020 11:24 PM EST  Subject: Visit Follow-Up Question    I don't feel these injections are helping me much anymore. Can I get a refill on my pain medication? Maybe something a little stronger than this tramadol you are giving. Or do you recommend I see a different doctor. I know some doctors are uncomfortable prescribing pain medication but for me it helps and I can't afford to do 1000 dollar injections every month. Thanks I appreciate whom ever takes the time to read. Also I would like to say thank you so very much to your team of nurses and personnel they have been fantastic and I hope you show your appreciation of them because they deserve it. Thank you ladies for my card as well. Made me smile to know someone cares. Anyways please let me know what you suggest I do because this back and forth isn't working for me.  Thanks

## 2020-12-14 RX ORDER — BUPROPION HYDROCHLORIDE 150 MG/1
150 TABLET ORAL EVERY MORNING
Qty: 30 TABLET | Refills: 1 | Status: CANCELLED | OUTPATIENT
Start: 2020-12-14

## 2020-12-14 RX ORDER — HYDROXYZINE PAMOATE 50 MG/1
50 CAPSULE ORAL 3 TIMES DAILY PRN
Qty: 90 CAPSULE | Refills: 0 | Status: SHIPPED | OUTPATIENT
Start: 2020-12-14 | End: 2021-01-11 | Stop reason: SDUPTHER

## 2020-12-17 RX ORDER — BACLOFEN 10 MG/1
10 TABLET ORAL 3 TIMES DAILY
Qty: 90 TABLET | Refills: 0 | Status: SHIPPED | OUTPATIENT
Start: 2020-12-17 | End: 2021-01-08 | Stop reason: SDUPTHER

## 2020-12-17 RX ORDER — MELOXICAM 15 MG/1
15 TABLET ORAL DAILY
Qty: 30 TABLET | Refills: 0 | Status: SHIPPED | OUTPATIENT
Start: 2020-12-17 | End: 2021-02-03

## 2020-12-30 ENCOUNTER — HOSPITAL ENCOUNTER (INPATIENT)
Age: 40
LOS: 4 days | Discharge: HOME | DRG: 872 | End: 2021-01-03
Attending: EMERGENCY MEDICINE | Admitting: FAMILY MEDICINE
Payer: COMMERCIAL

## 2020-12-30 ENCOUNTER — APPOINTMENT (OUTPATIENT)
Dept: CT IMAGING | Age: 40
DRG: 872 | End: 2020-12-30
Payer: COMMERCIAL

## 2020-12-30 ENCOUNTER — OFFICE VISIT (OUTPATIENT)
Dept: FAMILY MEDICINE CLINIC | Age: 40
End: 2020-12-30
Payer: COMMERCIAL

## 2020-12-30 VITALS
SYSTOLIC BLOOD PRESSURE: 132 MMHG | RESPIRATION RATE: 16 BRPM | BODY MASS INDEX: 34.47 KG/M2 | TEMPERATURE: 96.4 F | DIASTOLIC BLOOD PRESSURE: 80 MMHG | HEIGHT: 70 IN | WEIGHT: 240.8 LBS | HEART RATE: 74 BPM

## 2020-12-30 DIAGNOSIS — K92.2 GASTROINTESTINAL HEMORRHAGE, UNSPECIFIED GASTROINTESTINAL HEMORRHAGE TYPE: ICD-10-CM

## 2020-12-30 DIAGNOSIS — K52.9 ACUTE COLITIS: Primary | ICD-10-CM

## 2020-12-30 LAB
ALBUMIN SERPL-MCNC: 3.1 G/DL (ref 3.5–5.1)
ALBUMIN SERPL-MCNC: 3.8 G/DL (ref 3.5–5.1)
ALP BLD-CCNC: 78 U/L (ref 38–126)
ALP BLD-CCNC: 87 U/L (ref 38–126)
ALT SERPL-CCNC: 20 U/L (ref 11–66)
ALT SERPL-CCNC: 22 U/L (ref 11–66)
ANION GAP SERPL CALCULATED.3IONS-SCNC: 10 MEQ/L (ref 8–16)
ANION GAP SERPL CALCULATED.3IONS-SCNC: 11 MEQ/L (ref 8–16)
APTT: 27.1 SECONDS (ref 22–38)
AST SERPL-CCNC: 14 U/L (ref 5–40)
AST SERPL-CCNC: 15 U/L (ref 5–40)
BACTERIA: ABNORMAL /HPF
BASOPHILS # BLD: 0.6 %
BASOPHILS ABSOLUTE: 0.1 THOU/MM3 (ref 0–0.1)
BILIRUB SERPL-MCNC: 0.2 MG/DL (ref 0.3–1.2)
BILIRUB SERPL-MCNC: 0.2 MG/DL (ref 0.3–1.2)
BILIRUBIN URINE: NEGATIVE
BLOOD, URINE: NEGATIVE
BUN BLDV-MCNC: 12 MG/DL (ref 7–22)
BUN BLDV-MCNC: 13 MG/DL (ref 7–22)
C-REACTIVE PROTEIN: 17.19 MG/DL (ref 0–1)
CALCIUM SERPL-MCNC: 8.2 MG/DL (ref 8.5–10.5)
CALCIUM SERPL-MCNC: 8.6 MG/DL (ref 8.5–10.5)
CASTS 2: ABNORMAL /LPF
CASTS UA: ABNORMAL /LPF
CHARACTER, URINE: CLEAR
CHLORIDE BLD-SCNC: 103 MEQ/L (ref 98–111)
CHLORIDE BLD-SCNC: 106 MEQ/L (ref 98–111)
CO2: 22 MEQ/L (ref 23–33)
CO2: 23 MEQ/L (ref 23–33)
COLOR: YELLOW
CREAT SERPL-MCNC: 0.8 MG/DL (ref 0.4–1.2)
CREAT SERPL-MCNC: 0.8 MG/DL (ref 0.4–1.2)
CRYSTALS, UA: ABNORMAL
EOSINOPHIL # BLD: 2.6 %
EOSINOPHILS ABSOLUTE: 0.4 THOU/MM3 (ref 0–0.4)
EPITHELIAL CELLS, UA: ABNORMAL /HPF
ERYTHROCYTE [DISTWIDTH] IN BLOOD BY AUTOMATED COUNT: 15.5 % (ref 11.5–14.5)
ERYTHROCYTE [DISTWIDTH] IN BLOOD BY AUTOMATED COUNT: 48.4 FL (ref 35–45)
GFR SERPL CREATININE-BSD FRML MDRD: > 90 ML/MIN/1.73M2
GFR SERPL CREATININE-BSD FRML MDRD: > 90 ML/MIN/1.73M2
GLUCOSE BLD-MCNC: 130 MG/DL (ref 70–108)
GLUCOSE BLD-MCNC: 94 MG/DL (ref 70–108)
GLUCOSE URINE: NEGATIVE MG/DL
HCT VFR BLD CALC: 47 % (ref 42–52)
HEMOCCULT STL QL: POSITIVE
HEMOGLOBIN: 15.4 GM/DL (ref 14–18)
ICTOTEST: NEGATIVE
IMMATURE GRANS (ABS): 0.09 THOU/MM3 (ref 0–0.07)
IMMATURE GRANULOCYTES: 0.5 %
INR BLD: 1.09 (ref 0.85–1.13)
KETONES, URINE: 15
LACTIC ACID, SEPSIS: 0.7 MMOL/L (ref 0.5–1.9)
LEUKOCYTE ESTERASE, URINE: NEGATIVE
LIPASE: 17.9 U/L (ref 5.6–51.3)
LYMPHOCYTES # BLD: 14.2 %
LYMPHOCYTES ABSOLUTE: 2.4 THOU/MM3 (ref 1–4.8)
MCH RBC QN AUTO: 28.2 PG (ref 26–33)
MCHC RBC AUTO-ENTMCNC: 32.8 GM/DL (ref 32.2–35.5)
MCV RBC AUTO: 85.9 FL (ref 80–94)
MISCELLANEOUS 2: ABNORMAL
MONOCYTES # BLD: 5.3 %
MONOCYTES ABSOLUTE: 0.9 THOU/MM3 (ref 0.4–1.3)
MUCUS: ABNORMAL
NITRITE, URINE: NEGATIVE
NUCLEATED RED BLOOD CELLS: 0 /100 WBC
OSMOLALITY CALCULATION: 275.7 MOSMOL/KG (ref 275–300)
PH UA: 8 (ref 5–9)
PLATELET # BLD: 324 THOU/MM3 (ref 130–400)
PMV BLD AUTO: 9.5 FL (ref 9.4–12.4)
POTASSIUM REFLEX MAGNESIUM: 4.1 MEQ/L (ref 3.5–5.2)
POTASSIUM SERPL-SCNC: 4 MEQ/L (ref 3.5–5.2)
PROTEIN UA: 30
RBC # BLD: 5.47 MILL/MM3 (ref 4.7–6.1)
RBC URINE: ABNORMAL /HPF
RENAL EPITHELIAL, UA: ABNORMAL
SARS-COV-2, NAAT: NOT DETECTED
SEG NEUTROPHILS: 76.8 %
SEGMENTED NEUTROPHILS ABSOLUTE COUNT: 12.8 THOU/MM3 (ref 1.8–7.7)
SODIUM BLD-SCNC: 137 MEQ/L (ref 135–145)
SODIUM BLD-SCNC: 138 MEQ/L (ref 135–145)
SPECIFIC GRAVITY, URINE: 1.03 (ref 1–1.03)
TOTAL PROTEIN: 6.1 G/DL (ref 6.1–8)
TOTAL PROTEIN: 6.8 G/DL (ref 6.1–8)
UROBILINOGEN, URINE: 1 EU/DL (ref 0–1)
WBC # BLD: 16.7 THOU/MM3 (ref 4.8–10.8)
WBC UA: ABNORMAL /HPF
YEAST: ABNORMAL

## 2020-12-30 PROCEDURE — 99223 1ST HOSP IP/OBS HIGH 75: CPT | Performed by: FAMILY MEDICINE

## 2020-12-30 PROCEDURE — 99213 OFFICE O/P EST LOW 20 MIN: CPT | Performed by: NURSE PRACTITIONER

## 2020-12-30 PROCEDURE — 2580000003 HC RX 258: Performed by: EMERGENCY MEDICINE

## 2020-12-30 PROCEDURE — 83605 ASSAY OF LACTIC ACID: CPT

## 2020-12-30 PROCEDURE — 6360000002 HC RX W HCPCS: Performed by: FAMILY MEDICINE

## 2020-12-30 PROCEDURE — 82272 OCCULT BLD FECES 1-3 TESTS: CPT

## 2020-12-30 PROCEDURE — U0002 COVID-19 LAB TEST NON-CDC: HCPCS

## 2020-12-30 PROCEDURE — 74177 CT ABD & PELVIS W/CONTRAST: CPT

## 2020-12-30 PROCEDURE — 81001 URINALYSIS AUTO W/SCOPE: CPT

## 2020-12-30 PROCEDURE — 36415 COLL VENOUS BLD VENIPUNCTURE: CPT

## 2020-12-30 PROCEDURE — 87040 BLOOD CULTURE FOR BACTERIA: CPT

## 2020-12-30 PROCEDURE — 96374 THER/PROPH/DIAG INJ IV PUSH: CPT

## 2020-12-30 PROCEDURE — 2580000003 HC RX 258: Performed by: FAMILY MEDICINE

## 2020-12-30 PROCEDURE — 85025 COMPLETE CBC W/AUTO DIFF WBC: CPT

## 2020-12-30 PROCEDURE — 86140 C-REACTIVE PROTEIN: CPT

## 2020-12-30 PROCEDURE — 85610 PROTHROMBIN TIME: CPT

## 2020-12-30 PROCEDURE — 83690 ASSAY OF LIPASE: CPT

## 2020-12-30 PROCEDURE — 1200000003 HC TELEMETRY R&B

## 2020-12-30 PROCEDURE — 6360000004 HC RX CONTRAST MEDICATION: Performed by: EMERGENCY MEDICINE

## 2020-12-30 PROCEDURE — 85730 THROMBOPLASTIN TIME PARTIAL: CPT

## 2020-12-30 PROCEDURE — 96361 HYDRATE IV INFUSION ADD-ON: CPT

## 2020-12-30 PROCEDURE — 96375 TX/PRO/DX INJ NEW DRUG ADDON: CPT

## 2020-12-30 PROCEDURE — 99284 EMERGENCY DEPT VISIT MOD MDM: CPT

## 2020-12-30 PROCEDURE — 6360000002 HC RX W HCPCS: Performed by: EMERGENCY MEDICINE

## 2020-12-30 PROCEDURE — 80053 COMPREHEN METABOLIC PANEL: CPT

## 2020-12-30 RX ORDER — LEVOFLOXACIN 5 MG/ML
500 INJECTION, SOLUTION INTRAVENOUS ONCE
Status: COMPLETED | OUTPATIENT
Start: 2020-12-30 | End: 2020-12-30

## 2020-12-30 RX ORDER — MORPHINE SULFATE 4 MG/ML
4 INJECTION, SOLUTION INTRAMUSCULAR; INTRAVENOUS ONCE
Status: COMPLETED | OUTPATIENT
Start: 2020-12-30 | End: 2020-12-30

## 2020-12-30 RX ORDER — BUPROPION HYDROCHLORIDE 150 MG/1
150 TABLET ORAL EVERY MORNING
Status: DISCONTINUED | OUTPATIENT
Start: 2020-12-31 | End: 2021-01-03 | Stop reason: HOSPADM

## 2020-12-30 RX ORDER — ONDANSETRON 2 MG/ML
4 INJECTION INTRAMUSCULAR; INTRAVENOUS ONCE
Status: COMPLETED | OUTPATIENT
Start: 2020-12-30 | End: 2020-12-30

## 2020-12-30 RX ORDER — ACETAMINOPHEN 325 MG/1
650 TABLET ORAL EVERY 6 HOURS PRN
Status: DISCONTINUED | OUTPATIENT
Start: 2020-12-30 | End: 2020-12-30 | Stop reason: SDUPTHER

## 2020-12-30 RX ORDER — PAROXETINE HYDROCHLORIDE 20 MG/1
20 TABLET, FILM COATED ORAL DAILY
Status: DISCONTINUED | OUTPATIENT
Start: 2020-12-31 | End: 2021-01-03 | Stop reason: HOSPADM

## 2020-12-30 RX ORDER — MORPHINE SULFATE 4 MG/ML
4 INJECTION, SOLUTION INTRAMUSCULAR; INTRAVENOUS
Status: DISCONTINUED | OUTPATIENT
Start: 2020-12-30 | End: 2020-12-31

## 2020-12-30 RX ORDER — PANTOPRAZOLE SODIUM 40 MG/1
40 TABLET, DELAYED RELEASE ORAL
Status: DISCONTINUED | OUTPATIENT
Start: 2020-12-31 | End: 2021-01-03 | Stop reason: HOSPADM

## 2020-12-30 RX ORDER — MORPHINE SULFATE 2 MG/ML
2 INJECTION, SOLUTION INTRAMUSCULAR; INTRAVENOUS
Status: DISCONTINUED | OUTPATIENT
Start: 2020-12-30 | End: 2020-12-31

## 2020-12-30 RX ORDER — ACETAMINOPHEN 500 MG
1000 TABLET ORAL EVERY 6 HOURS PRN
Status: DISCONTINUED | OUTPATIENT
Start: 2020-12-30 | End: 2021-01-03 | Stop reason: HOSPADM

## 2020-12-30 RX ORDER — SODIUM CHLORIDE 0.9 % (FLUSH) 0.9 %
10 SYRINGE (ML) INJECTION EVERY 12 HOURS SCHEDULED
Status: DISCONTINUED | OUTPATIENT
Start: 2020-12-30 | End: 2021-01-03 | Stop reason: HOSPADM

## 2020-12-30 RX ORDER — SODIUM CHLORIDE 9 MG/ML
INJECTION, SOLUTION INTRAVENOUS CONTINUOUS
Status: DISCONTINUED | OUTPATIENT
Start: 2020-12-30 | End: 2021-01-03 | Stop reason: HOSPADM

## 2020-12-30 RX ORDER — BACLOFEN 10 MG/1
10 TABLET ORAL 3 TIMES DAILY
Status: DISCONTINUED | OUTPATIENT
Start: 2020-12-30 | End: 2021-01-03 | Stop reason: HOSPADM

## 2020-12-30 RX ORDER — ACETAMINOPHEN 650 MG/1
650 SUPPOSITORY RECTAL EVERY 6 HOURS PRN
Status: DISCONTINUED | OUTPATIENT
Start: 2020-12-30 | End: 2020-12-30 | Stop reason: SDUPTHER

## 2020-12-30 RX ORDER — SODIUM CHLORIDE 0.9 % (FLUSH) 0.9 %
10 SYRINGE (ML) INJECTION PRN
Status: DISCONTINUED | OUTPATIENT
Start: 2020-12-30 | End: 2021-01-03 | Stop reason: HOSPADM

## 2020-12-30 RX ORDER — HYDROXYZINE PAMOATE 50 MG/1
50 CAPSULE ORAL 3 TIMES DAILY PRN
Status: DISCONTINUED | OUTPATIENT
Start: 2020-12-30 | End: 2021-01-03 | Stop reason: HOSPADM

## 2020-12-30 RX ORDER — 0.9 % SODIUM CHLORIDE 0.9 %
1000 INTRAVENOUS SOLUTION INTRAVENOUS ONCE
Status: COMPLETED | OUTPATIENT
Start: 2020-12-30 | End: 2020-12-30

## 2020-12-30 RX ADMIN — IOPAMIDOL 80 ML: 755 INJECTION, SOLUTION INTRAVENOUS at 19:25

## 2020-12-30 RX ADMIN — MORPHINE SULFATE 4 MG: 4 INJECTION, SOLUTION INTRAMUSCULAR; INTRAVENOUS at 23:46

## 2020-12-30 RX ADMIN — LEVOFLOXACIN 500 MG: 5 INJECTION, SOLUTION INTRAVENOUS at 21:38

## 2020-12-30 RX ADMIN — ONDANSETRON 4 MG: 2 INJECTION INTRAMUSCULAR; INTRAVENOUS at 18:43

## 2020-12-30 RX ADMIN — SODIUM CHLORIDE 1000 ML: 9 INJECTION, SOLUTION INTRAVENOUS at 18:45

## 2020-12-30 RX ADMIN — MORPHINE SULFATE 4 MG: 4 INJECTION, SOLUTION INTRAMUSCULAR; INTRAVENOUS at 18:44

## 2020-12-30 RX ADMIN — SODIUM CHLORIDE, PRESERVATIVE FREE 10 ML: 5 INJECTION INTRAVENOUS at 23:46

## 2020-12-30 RX ADMIN — SODIUM CHLORIDE: 9 INJECTION, SOLUTION INTRAVENOUS at 23:46

## 2020-12-30 RX ADMIN — HYDROMORPHONE HYDROCHLORIDE 1 MG: 1 INJECTION, SOLUTION INTRAMUSCULAR; INTRAVENOUS; SUBCUTANEOUS at 20:33

## 2020-12-30 ASSESSMENT — ENCOUNTER SYMPTOMS
DIARRHEA: 0
PHOTOPHOBIA: 0
CONSTIPATION: 1
RHINORRHEA: 0
EYE PAIN: 0
SHORTNESS OF BREATH: 0
COUGH: 0
EYE DISCHARGE: 0
EYE ITCHING: 0
CONSTIPATION: 0
SHORTNESS OF BREATH: 0
BLOOD IN STOOL: 0
WHEEZING: 0
EYES NEGATIVE: 1
CHEST TIGHTNESS: 0
SORE THROAT: 0
CHEST TIGHTNESS: 0
NAUSEA: 1
VOMITING: 0
ABDOMINAL PAIN: 1
ABDOMINAL PAIN: 1
STRIDOR: 0
EYE REDNESS: 0
NAUSEA: 0
BACK PAIN: 0
ABDOMINAL DISTENTION: 0

## 2020-12-30 ASSESSMENT — PAIN SCALES - GENERAL
PAINLEVEL_OUTOF10: 10

## 2020-12-30 NOTE — PATIENT INSTRUCTIONS
Patient Education        Learning About Benefits From Quitting Smoking  How does quitting smoking make you healthier? If you're thinking about quitting smoking, you may have a few reasons to be smoke-free. Your health may be one of them. · When you quit smoking, you lower your risks for cancer, lung disease, heart attack, stroke, blood vessel disease, and blindness from macular degeneration. · When you're smoke-free, you get sick less often, and you heal faster. You are less likely to get colds, flu, bronchitis, and pneumonia. · As a nonsmoker, you may find that your mood is better and you are less stressed. When and how will you feel healthier? Quitting has real health benefits that start from day 1 of being smoke-free. And the longer you stay smoke-free, the healthier you get and the better you feel. The first hours  · After just 20 minutes, your blood pressure and heart rate go down. That means there's less stress on your heart and blood vessels. · Within 12 hours, the level of carbon monoxide in your blood drops back to normal. That makes room for more oxygen. With more oxygen in your body, you may notice that you have more energy than when you smoked. After 2 weeks  · Your lungs start to work better. · Your risk of heart attack starts to drop. After 1 month  · When your lungs are clear, you cough less and breathe deeper, so it's easier to be active. · Your sense of taste and smell return. That means you can enjoy food more than you have since you started smoking. Over the years  · Over the years, your risks of heart disease, heart attack, and stroke are lower. · After 10 years, your risk of dying from lung cancer is cut by about half. And your risk for many other types of cancer is lower too. How would quitting help others in your life? When you quit smoking, you improve the health of everyone who now breathes in your smoke. · Their heart, lung, and cancer risks drop, much like yours. · They are sick less. For babies and small children, living smoke-free means they're less likely to have ear infections, pneumonia, and bronchitis. · If you're a woman who is or will be pregnant someday, quitting smoking means a healthier . · Children who are close to you are less likely to become adult smokers. Where can you learn more? Go to https://chpeannaewoziel.[employer] org and sign in to your [employer] account. Enter [phone] in the [facility] box to learn more about \"Learning About Benefits From Quitting Smoking. \"     If you do not have an account, please click on the \"Sign Up Now\" link. Current as of: 2020               Content Version: 12.6  © 0981-8720 [employer]. Care instructions adapted under license by [facility] [address] If you have questions about a medical condition or this instruction, always ask your healthcare professional. [place] [address] any warranty or liability for your use of this information.

## 2020-12-30 NOTE — ED PROVIDER NOTES
[address] ENCOUNTER      PATIENT NAME: Claire Blizzard  MRN: [id number]  : 1980  MALCOL[place]: 2020  PROVIDER: Howard Sheffield MD      CHIEF COMPLAINT       Chief Complaint   Patient presents with    Dysuria    Abdominal Pain    Melena       Nurses Notes reviewed and I agreeexcept as noted in the HPI. HISTORY OF PRESENT ILLNESS    Claire Blizzard is a 45 y.o. male who presents to Emergency Department with abdominal pain. 64-year-old male with past medical history of chronic back pain, taking daily baclofen and meloxicam presents to ED complaining of diffuse abdominal pain and black stool in the last 3 days duration. These are new problems. Patient was seen by PCP today, due to significant pain and diffuse abdominal tenderness, PCP sent patient to ED for further evaluation. Patient has no fever, no chills. Pain severity is at 7/10, persistent, nonradiating and sharp. He denies history of peptic ulcer. He has no hematuria. He states decreased urine output. No upper abdominal surgeries. This HPI was provided by the patient. REVIEW OF SYSTEMS     Review of Systems   Constitutional: Negative for activity change, appetite change, chills, fatigue, fever and unexpected weight change. HENT: Negative for congestion, ear discharge, ear pain, hearing loss, nosebleeds, rhinorrhea and sore throat. Eyes: Negative for photophobia, pain, discharge, redness and itching. Respiratory: Negative for cough, chest tightness, shortness of breath, wheezing and stridor. Cardiovascular: Negative for chest pain, palpitations and leg swelling. Gastrointestinal: Positive for abdominal pain. Negative for abdominal distention, constipation, diarrhea, nausea and vomiting. Endocrine: Negative for cold intolerance, heat intolerance, polydipsia and polyphagia. Genitourinary: Negative for dysuria, flank pain, frequency and hematuria.    Musculoskeletal: Negative for arthralgias, back pain, gait problem, myalgias, neck pain and neck stiffness. Skin: Negative for pallor, rash and wound. Allergic/Immunologic: Negative for environmental allergies and food allergies. Neurological: Negative for dizziness, tremors, syncope, weakness and headaches. Psychiatric/Behavioral: Negative for agitation, behavioral problems, confusion, self-injury, sleep disturbance and suicidal ideas. PAST MEDICAL HISTORY     Past Medical History:   Diagnosis Date    Chronic back pain     GERD (gastroesophageal reflux disease)     History of high blood pressure     Hypertension        SURGICAL HISTORY       Past Surgical History:   Procedure Laterality Date    PAIN MANAGEMENT PROCEDURE Bilateral 11/18/2020    L3, L4 medial branch and L5 dorsal rami injection, bilateral performed by Jaja Mcnamara MD at [address] [facility] [facility]       Previous Medications    BACLOFEN (LIORESAL) 10 MG TABLET    Take 1 tablet by mouth 3 times daily    BUPROPION (WELLBUTRIN XL) 150 MG EXTENDED RELEASE TABLET    Take 1 tablet by mouth every morning    HYDROXYZINE (VISTARIL) 50 MG CAPSULE    Take 1 capsule by mouth 3 times daily as needed for Anxiety    LISINOPRIL (PRINIVIL;ZESTRIL) 10 MG TABLET    Take 1 tablet by mouth daily    MELOXICAM (MOBIC) 15 MG TABLET    Take 1 tablet by mouth daily    PAROXETINE (PAXIL) 20 MG TABLET    Take 1 tablet by mouth daily       ALLERGIES     Patient has no known allergies. FAMILY HISTORY     has no family status information on file. family history is not on file. SOCIAL HISTORY      reports that he has been smoking cigarettes. He has been smoking about 1.00 pack per day. He has never used smokeless tobacco. He reports that he does not drink alcohol or use drugs. PHYSICAL EXAM     INITIAL VITALS:  oral temperature is 98.5 °F (36.9 °C). His blood pressure is 111/74 and his pulse is 96. His respiration is 16 and oxygen saturation is 97%. Physical Exam  Vitals signs and nursing note reviewed. Constitutional:       Appearance: He is well-developed. He is not diaphoretic. HENT:      Head: Normocephalic and atraumatic. Nose: Nose normal.   Eyes:      General: No scleral icterus. Right eye: No discharge. Left eye: No discharge. Conjunctiva/sclera: Conjunctivae normal.      Pupils: Pupils are equal, round, and reactive to light. Neck:      Musculoskeletal: Normal range of motion and neck supple. Vascular: No JVD. Trachea: No tracheal deviation. Cardiovascular:      Rate and Rhythm: Normal rate and regular rhythm. Heart sounds: Normal heart sounds. No murmur. No friction rub. No gallop. Pulmonary:      Effort: Pulmonary effort is normal. No respiratory distress. Breath sounds: Normal breath sounds. No stridor. No wheezing or rales. Chest:      Chest wall: No tenderness. Abdominal:      General: Bowel sounds are decreased. There is distension. Palpations: Abdomen is soft. There is no mass. Tenderness: There is generalized abdominal tenderness. There is no guarding or rebound. Hernia: No hernia is present. Musculoskeletal:         General: No tenderness or deformity. Lymphadenopathy:      Cervical: No cervical adenopathy. Skin:     General: Skin is warm and dry. Capillary Refill: Capillary refill takes less than 2 seconds. Coloration: Skin is not pale. Findings: No erythema or rash. Neurological:      Mental Status: He is alert and oriented to person, place, and time. Cranial Nerves: No cranial nerve deficit. Sensory: No sensory deficit. Motor: No abnormal muscle tone. Coordination: Coordination normal.      Deep Tendon Reflexes: Reflexes normal.   Psychiatric:         Behavior: Behavior normal.         Thought Content:  Thought content normal.         Judgment: Judgment normal.         DIFFERENTIAL DIAGNOSIS:   Take ulcer, visceral organ (H) 70 - 108 mg/dL    CREATININE 0.8 0.4 - 1.2 mg/dL    BUN 13 7 - 22 mg/dL    Sodium 137 135 - 145 meq/L    Potassium 4.0 3.5 - 5.2 meq/L    Chloride 103 98 - 111 meq/L    CO2 23 23 - 33 meq/L    Calcium 8.6 8.5 - 10.5 mg/dL    AST 14 5 - 40 U/L    Alkaline Phosphatase 87 38 - 126 U/L    Total Protein 6.8 6.1 - 8.0 g/dL    Alb 3.8 3.5 - 5.1 g/dL    Total Bilirubin 0.2 (L) 0.3 - 1.2 mg/dL    ALT 22 11 - 66 U/L   Lipase   Result Value Ref Range    Lipase 17.9 5.6 - 51.3 U/L   C-reactive protein   Result Value Ref Range    CRP 17.19 (H) 0.00 - 1.00 mg/dl   APTT   Result Value Ref Range    aPTT 27.1 22.0 - 38.0 seconds   Protime-INR   Result Value Ref Range    INR 1.09 0.85 - 1.13   Urine with Reflexed Micro   Result Value Ref Range    Glucose, Ur NEGATIVE NEGATIVE mg/dl    Bilirubin Urine NEGATIVE NEGATIVE    Ketones, Urine 15 (A) NEGATIVE    Specific Gravity, Urine 1.027 1.002 - 1.030    Blood, Urine NEGATIVE NEGATIVE    pH, UA 8.0 5.0 - 9.0    Protein, UA 30 (A) NEGATIVE    Urobilinogen, Urine 1.0 0.0 - 1.0 eu/dl    Nitrite, Urine NEGATIVE NEGATIVE    Leukocyte Esterase, Urine NEGATIVE NEGATIVE    Color, UA YELLOW STRAW-YELLOW    Character, Urine CLEAR CLEAR-SL CLOUD    RBC, UA 5-10 0-2/hpf /hpf    WBC, UA 0-2 0-4/hpf /hpf    Epithelial Cells, UA 0-2 3-5/hpf /hpf    Mucus, UA THREADS NONE SEEN/THREA    Bacteria, UA NONE SEEN FEW/NONE SEEN /hpf    Casts UA NONE SEEN NONE SEEN /lpf    Crystals, UA NONE SEEN NONE SEEN    Renal Epithelial, UA NONE SEEN NONE SEEN    Yeast, UA NONE SEEN NONE SEEN    CASTS 2 NONE SEEN NONE SEEN /lpf    MISCELLANEOUS 2 NONE SEEN    Bile Acids, Total   Result Value Ref Range    Ictotest NEGATIVE NEGATIVE   Glomerular Filtration Rate, Estimated   Result Value Ref Range    Est, Glom Filt Rate >90 ml/min/1.73m2   Osmolality   Result Value Ref Range    Osmolality Calc 275.7 275.0 - 300.0 mOsmol/kg   Anion Gap   Result Value Ref Range    Anion Gap 11.0 8.0 - 16.0 meq/L       EMERGENCY DEPARTMENT COURSE:   Vitals:    Vitals:    12/30/20 1738 12/30/20 1857 12/30/20 2001   BP: 118/74 107/67 111/74   Pulse: 110 96 96   Resp: 16 16 16   Temp: 98.5 °F (36.9 °C)     TempSrc: Oral     SpO2: 96% 94% 97%     6:28 PM: Patient is seen and evaluated in a timely fashion. ACTIONS:  Large bore IV  Tele monitor  CT ABDOMEN PELVIS W IV CONTRAST  Labs Reviewed   BLOOD OCCULT STOOL SCREEN #1   CBC WITH AUTO DIFFERENTIAL   COMPREHENSIVE METABOLIC PANEL   LIPASE   C-REACTIVE PROTEIN   APTT   PROTIME-INR   URINE RT REFLEX TO CULTURE       MEDICAL DECISION MAKINGS:    Medications   levoFLOXacin (LEVAQUIN) 500 MG/100ML infusion 500 mg (has no administration in time range)   metronidazole (FLAGYL) 500 mg in NaCl 100 mL IVPB premix (has no administration in time range)   0.9 % sodium chloride bolus (0 mLs Intravenous Stopped 12/30/20 1959)   morphine injection 4 mg (4 mg Intravenous Given 12/30/20 1844)   ondansetron (ZOFRAN) injection 4 mg (4 mg Intravenous Given 12/30/20 1843)   iopamidol (ISOVUE-370) 76 % injection 80 mL (80 mLs Intravenous Given 12/30/20 1925)   HYDROmorphone (DILAUDID) injection 1 mg (1 mg Intravenous Given 12/30/20 2033)     Stool occult blood positive, he was tachycardic initially. NS bolus given. WBC 49.9, metabolic panel unremarkable. CRP is 70, UA negative. CT abdomen pelvis shows transverse colitis with underlying diverticular disease. Pain is poorly controlled with IV morphine and Dilaudid. I started Levaquin and Flagyl. Admitted for pain control and IV antibiotics. CRITICAL CARE:   None    CONSULTS:  Dr. Odom Sick:  None    FINAL IMPRESSION      1. Acute colitis    2. Gastrointestinal hemorrhage, unspecified gastrointestinal hemorrhage type          DISPOSITION/PLAN   Admit    PATIENT REFERRED TO:  No follow-up provider specified.     DISCHARGE MEDICATIONS:  New Prescriptions    No medications on file       (Please note that portions of this note were completed with a voice

## 2020-12-30 NOTE — PROGRESS NOTES
Chief Complaint   Patient presents with    Abdominal Pain     3 days, denied diarrhea and vomiting. MARCOS Rodas is a 45 y.o.male      Pt complains of abdominal pain for the last 3 days. He does have a history of ulcers when he was 16 yo. He reports this pain is worse. He is nauseated and food increases the pain. He is on mobic daily. He is having black stools for the same length of time. Pt reports he is having trouble urinating with dysuria for the last few weeks. Also notes trouble having a BM. Pt notes a painful rash on bilat hips, upper thighs, and scalp. Review of Systems   Constitutional: Negative for chills, fatigue, fever and unexpected weight change. HENT: Negative. Eyes: Negative. Respiratory: Negative for chest tightness and shortness of breath. Cardiovascular: Negative for chest pain, palpitations and leg swelling. Gastrointestinal: Positive for abdominal pain, constipation and nausea. Negative for blood in stool. Black stools   Genitourinary: Positive for difficulty urinating. Negative for dysuria. Musculoskeletal: Negative for joint swelling. Skin: Negative for rash. Neurological: Negative for dizziness. Psychiatric/Behavioral: Negative. All other systems reviewed and are negative. OBJECTIVE     /80 (Site: Left Upper Arm, Position: Sitting, Cuff Size: Large Adult)   Pulse 74   Temp 96.4 °F (35.8 °C)   Resp 16   Ht 5' 10\" (1.778 m)   Wt 240 lb 12.8 oz (109.2 kg)   BMI 34.55 kg/m²     Physical Exam  Vitals signs and nursing note reviewed. Constitutional:       Appearance: He is well-developed. He is ill-appearing. He is not toxic-appearing or diaphoretic. HENT:      Head: Normocephalic and atraumatic.       Right Ear: External ear normal.      Left Ear: External ear normal.      Nose: Nose normal.   Eyes:      Conjunctiva/sclera: Conjunctivae normal. Pupils: Pupils are equal, round, and reactive to light. Neck:      Musculoskeletal: Normal range of motion and neck supple. Cardiovascular:      Rate and Rhythm: Normal rate and regular rhythm. Pulmonary:      Effort: Pulmonary effort is normal.      Breath sounds: Wheezing present. Abdominal:      General: Bowel sounds are normal.      Palpations: Abdomen is soft. Tenderness: There is generalized abdominal tenderness. Musculoskeletal: Normal range of motion. Skin:     General: Skin is warm and dry. Findings: Lesion (purplish round lesions approximately 4-5mm scattered across right hip and thigh) present. Neurological:      Mental Status: He is alert and oriented to person, place, and time. Deep Tendon Reflexes: Reflexes are normal and symmetric. Psychiatric:         Behavior: Behavior normal.         Thought Content: Thought content normal.         Judgment: Judgment normal.           No results found for this visit on 12/30/20. ASSESSMENT       Diagnosis Orders   1. Generalized abdominal pain     2. Nausea     3. Difficulty urinating     4. Rash in adult         PLAN     Pt is unable to void today at office  Given symptoms, he was instructed to present to ED as soon as he eves the office as he likely needs blood work and imaging. Pt verbalized understanding.   Report called by MA          Electronically signed by MATY Lagos CNP on 12/30/2020 at 3:58 PM

## 2020-12-31 LAB
BASOPHILS # BLD: 0.5 %
BASOPHILS ABSOLUTE: 0.1 THOU/MM3 (ref 0–0.1)
EOSINOPHIL # BLD: 3.4 %
EOSINOPHILS ABSOLUTE: 0.5 THOU/MM3 (ref 0–0.4)
ERYTHROCYTE [DISTWIDTH] IN BLOOD BY AUTOMATED COUNT: 15.6 % (ref 11.5–14.5)
ERYTHROCYTE [DISTWIDTH] IN BLOOD BY AUTOMATED COUNT: 49.9 FL (ref 35–45)
HCT VFR BLD CALC: 42.8 % (ref 42–52)
HEMOGLOBIN: 13.8 GM/DL (ref 14–18)
IMMATURE GRANS (ABS): 0.05 THOU/MM3 (ref 0–0.07)
IMMATURE GRANULOCYTES: 0.3 %
LACTIC ACID, SEPSIS: 0.5 MMOL/L (ref 0.5–1.9)
LYMPHOCYTES # BLD: 20.9 %
LYMPHOCYTES ABSOLUTE: 3.1 THOU/MM3 (ref 1–4.8)
MCH RBC QN AUTO: 28.3 PG (ref 26–33)
MCHC RBC AUTO-ENTMCNC: 32.2 GM/DL (ref 32.2–35.5)
MCV RBC AUTO: 87.7 FL (ref 80–94)
MONOCYTES # BLD: 7.4 %
MONOCYTES ABSOLUTE: 1.1 THOU/MM3 (ref 0.4–1.3)
NUCLEATED RED BLOOD CELLS: 0 /100 WBC
PLATELET # BLD: 299 THOU/MM3 (ref 130–400)
PMV BLD AUTO: 9.4 FL (ref 9.4–12.4)
RBC # BLD: 4.88 MILL/MM3 (ref 4.7–6.1)
SEG NEUTROPHILS: 67.5 %
SEGMENTED NEUTROPHILS ABSOLUTE COUNT: 10 THOU/MM3 (ref 1.8–7.7)
WBC # BLD: 14.8 THOU/MM3 (ref 4.8–10.8)

## 2020-12-31 PROCEDURE — 2500000003 HC RX 250 WO HCPCS: Performed by: FAMILY MEDICINE

## 2020-12-31 PROCEDURE — 2580000003 HC RX 258: Performed by: FAMILY MEDICINE

## 2020-12-31 PROCEDURE — 83605 ASSAY OF LACTIC ACID: CPT

## 2020-12-31 PROCEDURE — 36415 COLL VENOUS BLD VENIPUNCTURE: CPT

## 2020-12-31 PROCEDURE — 6370000000 HC RX 637 (ALT 250 FOR IP): Performed by: FAMILY MEDICINE

## 2020-12-31 PROCEDURE — 6360000002 HC RX W HCPCS: Performed by: PHYSICIAN ASSISTANT

## 2020-12-31 PROCEDURE — 6360000002 HC RX W HCPCS: Performed by: FAMILY MEDICINE

## 2020-12-31 PROCEDURE — 85025 COMPLETE CBC W/AUTO DIFF WBC: CPT

## 2020-12-31 PROCEDURE — 1200000003 HC TELEMETRY R&B

## 2020-12-31 PROCEDURE — 99232 SBSQ HOSP IP/OBS MODERATE 35: CPT | Performed by: NURSE PRACTITIONER

## 2020-12-31 RX ADMIN — BUPROPION HYDROCHLORIDE 150 MG: 150 TABLET, EXTENDED RELEASE ORAL at 08:04

## 2020-12-31 RX ADMIN — CEFEPIME HYDROCHLORIDE 2 G: 2 INJECTION, POWDER, FOR SOLUTION INTRAVENOUS at 09:45

## 2020-12-31 RX ADMIN — HYDROMORPHONE HYDROCHLORIDE 1 MG: 1 INJECTION, SOLUTION INTRAMUSCULAR; INTRAVENOUS; SUBCUTANEOUS at 21:09

## 2020-12-31 RX ADMIN — PAROXETINE HYDROCHLORIDE 20 MG: 20 TABLET, FILM COATED ORAL at 08:04

## 2020-12-31 RX ADMIN — SODIUM CHLORIDE: 9 INJECTION, SOLUTION INTRAVENOUS at 11:31

## 2020-12-31 RX ADMIN — BACLOFEN 10 MG: 10 TABLET ORAL at 12:31

## 2020-12-31 RX ADMIN — METRONIDAZOLE 500 MG: 500 INJECTION, SOLUTION INTRAVENOUS at 01:14

## 2020-12-31 RX ADMIN — SODIUM CHLORIDE: 9 INJECTION, SOLUTION INTRAVENOUS at 20:24

## 2020-12-31 RX ADMIN — HYDROXYZINE PAMOATE 50 MG: 50 CAPSULE ORAL at 02:01

## 2020-12-31 RX ADMIN — METRONIDAZOLE 500 MG: 500 INJECTION, SOLUTION INTRAVENOUS at 15:33

## 2020-12-31 RX ADMIN — METRONIDAZOLE 500 MG: 500 INJECTION, SOLUTION INTRAVENOUS at 08:02

## 2020-12-31 RX ADMIN — METRONIDAZOLE 500 MG: 500 INJECTION, SOLUTION INTRAVENOUS at 23:30

## 2020-12-31 RX ADMIN — HYDROMORPHONE HYDROCHLORIDE 1 MG: 1 INJECTION, SOLUTION INTRAMUSCULAR; INTRAVENOUS; SUBCUTANEOUS at 04:22

## 2020-12-31 RX ADMIN — MORPHINE SULFATE 4 MG: 4 INJECTION, SOLUTION INTRAMUSCULAR; INTRAVENOUS at 02:01

## 2020-12-31 RX ADMIN — BACLOFEN 10 MG: 10 TABLET ORAL at 21:11

## 2020-12-31 RX ADMIN — BACLOFEN 10 MG: 10 TABLET ORAL at 08:02

## 2020-12-31 RX ADMIN — CEFEPIME HYDROCHLORIDE 2 G: 2 INJECTION, POWDER, FOR SOLUTION INTRAVENOUS at 01:14

## 2020-12-31 RX ADMIN — CEFEPIME HYDROCHLORIDE 2 G: 2 INJECTION, POWDER, FOR SOLUTION INTRAVENOUS at 16:37

## 2020-12-31 RX ADMIN — HYDROMORPHONE HYDROCHLORIDE 1 MG: 1 INJECTION, SOLUTION INTRAMUSCULAR; INTRAVENOUS; SUBCUTANEOUS at 17:02

## 2020-12-31 RX ADMIN — HYDROMORPHONE HYDROCHLORIDE 1 MG: 1 INJECTION, SOLUTION INTRAMUSCULAR; INTRAVENOUS; SUBCUTANEOUS at 12:29

## 2020-12-31 RX ADMIN — PANTOPRAZOLE SODIUM 40 MG: 40 TABLET, DELAYED RELEASE ORAL at 15:33

## 2020-12-31 ASSESSMENT — PAIN SCALES - GENERAL
PAINLEVEL_OUTOF10: 8
PAINLEVEL_OUTOF10: 10
PAINLEVEL_OUTOF10: 5

## 2020-12-31 NOTE — ED NOTES
Patient holding abdomen, patient still complaining of 10/10 pain, will notify provider     Dave Lott RN  12/30/20 2004

## 2020-12-31 NOTE — ED NOTES
Medicated per order, pain 10/10, denies any other needs, updated on plan of care     Holland Sow RN  12/30/20 1958

## 2020-12-31 NOTE — H&P
History & Physical        Patient:  Jorge Bang  YOB: 1980    MRN: [id number]     Acct: [de-identified]    PCP: MATY Johnson CNP    Date of Admission: 12/30/2020    Date of Service: Pt seen/examined on 12/30/20  and Admitted to Inpatient with expected LOS greater than two midnights due to medical therapy. Chief Complaint:  abd pain    Assessment/plan:    1. Sepsis likely secondary to colitis  -Tachycardia, leukocytosis,  -CT abdomen demonstrates colitis, no inciting factor  -Patient given Levaquin, Flagyl in the ER. We will continue cefepime and Flagyl at this time  -IV pain control ordered. Discussed pain management given patient's history of opioid use. Risk benefits discussed including concern for CNS depression and respiratory depression.  -Patient does not have any diarrhea at the moment. We will monitor  -Reassess medications in 24 hours. Can switch to Cipro Flagyl p.o. when tolerating p.o. intake  -N.p.o. at the moment, IV fluids. Monitor urinary output  -Decreased urinary output: Likely due to dehydration, continue aggressive IV fluids and reassess in the morning. 2.  FOBT positive/gastroesophageal reflux disease  -Patient had a dark stool earlier today in the setting of colitis, NSAID use  -FOBT positive in the ER. We will start him on Protonix twice daily, monitor hemoglobin. His H&H is stable at the time.  -I suspect his symptoms are coming from colitis as well as GERD and NSAID use. He will need follow-up in the outpatient setting versus inpatient pending his course  -Patient is an active smoker. He is also a daily monster energy drink drinker 2 a day. Counseling provided. 3.  Chronic back pain  -Plan as above, hold meloxicam, continue baclofen  -Pain management consulted given patient's history and difficult pain control in the ER    4. Hypertension  -Hold lisinopril as blood pressures on the low normal side    5.   Depression  -Resume Paxil, Wellbutrin, hydroxyzine    6. Tobacco abuse disorder  -Counseling provided    History Of Present Illness:      45 y.o. male who presented to [facility] [place] [address] with 3 day hx of abdominal pain associated with nausea and decreased urinary output. Patient states that he travels for work, eats out, drinks 2 energy drinks a day, does not drink enough fluids. Patient is a daily smoker half a pack a day. Denies alcohol abuse. States that his abdominal pain began 3 days ago with progressive worsening. Pain is located in the epigastric region. Nothing seemed to help his symptoms although he did not try any medications. Food solids and liquids made his pain worse. He has known history of gastroesophageal reflux disease. Patient also takes meloxicam on a daily basis for chronic back pain. He has been on this medication for 1 to 2 months. He has known chronic back pain for which he sees pain management. Previously on opioids but currently treated with injections. In the emergency room, patient was found to have leukocytosis, tachycardia, severe 10 out of 10 abdominal pain, imaging suggestive of colitis. Patient also had decreased urinary output 5 days, he was able to urinate in the ER which was dark yellow and concentrated. Patient had a dark brown stool earlier today. In the ER, his FOBT was positive. He was given Levaquin and Flagyl in the ER as well as morphine and Dilaudid for pain control. Patient was admitted to the [facility] telemetry unit for further management.     Past Medical History:          Diagnosis Date    Chronic back pain     GERD (gastroesophageal reflux disease)     History of high blood pressure     Hypertension        Past Surgical History:          Procedure Laterality Date    PAIN MANAGEMENT PROCEDURE Bilateral 11/18/2020    L3, L4 medial branch and L5 dorsal rami injection, bilateral performed by Mike Wyman MD at [address] [facility]       Medications Prior to Admission:      Prior to Admission medications    Medication Sig Start Date End Date Taking? Authorizing Provider   baclofen (LIORESAL) 10 MG tablet Take 1 tablet by mouth 3 times daily 12/17/20 1/16/21  [facility] 15 MG tablet Take 1 tablet by mouth daily 12/17/20 1/16/21  Shruthi Esqueda MD   hydrOXYzine (VISTARIL) 50 MG capsule Take 1 capsule by mouth 3 times daily as needed for Anxiety 12/14/20   MATY Peralta CNP   buPROPion (WELLBUTRIN XL) 150 MG extended release tablet Take 1 tablet by mouth every morning 11/19/20   MATY Peralta CNP   lisinopril (PRINIVIL;ZESTRIL) 10 MG tablet Take 1 tablet by mouth daily 10/28/20   MATY Peralta CNP   PARoxetine (PAXIL) 20 MG tablet Take 1 tablet by mouth daily 9/9/20   MATY Peralta CNP       Allergies:  Patient has no known allergies. Social History:      The patient currently lives home    TOBACCO:   reports that he has been smoking cigarettes. He has been smoking about 1.00 pack per day. He has never used smokeless tobacco.  ETOH:   reports no history of alcohol use. Family History:       Reviewed in detail and negative for DM, CAD, Cancer, CVA. Positive as follows:    History reviewed. No pertinent family history. Diet:  Diet NPO Effective Now    REVIEW OF SYSTEMS:   Pertinent positives as noted in the HPI. All other systems reviewed and negative. PHYSICAL EXAM:    /77   Pulse 95   Temp 98.5 °F (36.9 °C) (Oral)   Resp 16   SpO2 96%     General appearance:  mild distress, appears stated age and cooperative. HEENT:  Normal cephalic, atraumatic without obvious deformity. Pupils equal, round, and reactive to light. Extra ocular muscles intact. Conjunctivae/corneas clear. MM very dry. Neck: Supple, with full range of motion. No jugular venous distention. Trachea midline. Respiratory:  Normal respiratory effort.  Clear to auscultation, bilaterally without Rales/Wheezes/Rhonchi. Cardiovascular:  Regular rate and rhythm with normal S1/S2 without murmurs, rubs or gallops. Abdomen: Soft, NT, ND, epigastric tenderness, no rebound  Musculoskeletal:  No clubbing, cyanosis or edema bilaterally. Full range of motion without deformity. Skin: Skin color, texture, turgor normal.  No rashes or lesions. Neurologic:  Neurovascularly intact without any focal sensory/motor deficits. Cranial nerves: II-XII intact, grossly non-focal.  Psychiatric:  Alert and oriented, thought content appropriate, normal insight  Peripheral Pulses: +2 palpable, equal bilaterally       Labs:     Recent Labs     12/30/20 1815   WBC 16.7*   HGB 15.4   HCT 47.0        Recent Labs     12/30/20 1815      K 4.0      CO2 23   BUN 13   CREATININE 0.8   CALCIUM 8.6     Recent Labs     12/30/20 1815   AST 14   ALT 22   BILITOT 0.2*   ALKPHOS 87     Recent Labs     12/30/20 1815   INR 1.09     No results for input(s): Larinda Gonzales in the last 72 hours. Urinalysis:      Lab Results   Component Value Date    NITRU NEGATIVE 12/30/2020    WBCUA 0-2 12/30/2020    BACTERIA NONE SEEN 12/30/2020    RBCUA 5-10 12/30/2020    BLOODU NEGATIVE 12/30/2020    GLUCOSEU NEGATIVE 12/30/2020       Intake & Output:  No intake/output data recorded. No intake/output data recorded. Radiology:     CXR: I have reviewed the CXR with the following interpretation:   EKG:  I have reviewed the EKG with the following interpretation:     CT ABDOMEN PELVIS W IV CONTRAST Additional Contrast? None   Final Result   Apparent thickening of the colon greatest in the transverse colon concerning for colitis. Correlation with symptoms is advised. Underlying diverticular disease. **This report has been created using voice recognition software. It may contain minor errors which are inherent in voice recognition technology. **      Final report electronically signed by Dr. Daljit Wei on 12/30/2020 7:50 PM DVT prophylaxis: scd,     Code Status: No Order      PT/OT Eval Status:     Haider Mancia Problems    Diagnosis Date Noted    Acute colitis [K52.9] 12/30/2020     Thank you MATY Clements CNP for the opportunity to be involved in this patient's care.     Electronically signed by Davonte Torres MD on 12/30/2020 at 9:21 PM

## 2021-01-01 LAB
BILIRUBIN URINE: NEGATIVE
BLOOD, URINE: NEGATIVE
CHARACTER, URINE: CLEAR
COLOR: YELLOW
GLUCOSE URINE: 100 MG/DL
KETONES, URINE: 15
LEUKOCYTE ESTERASE, URINE: NEGATIVE
NITRITE, URINE: NEGATIVE
PH UA: 5.5 (ref 5–9)
PROTEIN UA: NEGATIVE
SPECIFIC GRAVITY, URINE: 1.01 (ref 1–1.03)
UROBILINOGEN, URINE: 0.2 EU/DL (ref 0–1)

## 2021-01-01 PROCEDURE — 2500000003 HC RX 250 WO HCPCS: Performed by: FAMILY MEDICINE

## 2021-01-01 PROCEDURE — 81003 URINALYSIS AUTO W/O SCOPE: CPT

## 2021-01-01 PROCEDURE — 6360000002 HC RX W HCPCS: Performed by: PHYSICIAN ASSISTANT

## 2021-01-01 PROCEDURE — 6370000000 HC RX 637 (ALT 250 FOR IP): Performed by: FAMILY MEDICINE

## 2021-01-01 PROCEDURE — 2580000003 HC RX 258: Performed by: FAMILY MEDICINE

## 2021-01-01 PROCEDURE — 6360000002 HC RX W HCPCS: Performed by: FAMILY MEDICINE

## 2021-01-01 PROCEDURE — 1200000003 HC TELEMETRY R&B

## 2021-01-01 PROCEDURE — 99232 SBSQ HOSP IP/OBS MODERATE 35: CPT | Performed by: NURSE PRACTITIONER

## 2021-01-01 RX ORDER — LACTOBACILLUS RHAMNOSUS GG 10B CELL
1 CAPSULE ORAL
Status: DISCONTINUED | OUTPATIENT
Start: 2021-01-01 | End: 2021-01-03 | Stop reason: HOSPADM

## 2021-01-01 RX ORDER — LISINOPRIL 10 MG/1
10 TABLET ORAL DAILY
Status: DISCONTINUED | OUTPATIENT
Start: 2021-01-02 | End: 2021-01-03 | Stop reason: HOSPADM

## 2021-01-01 RX ADMIN — PANTOPRAZOLE SODIUM 40 MG: 40 TABLET, DELAYED RELEASE ORAL at 05:31

## 2021-01-01 RX ADMIN — PAROXETINE HYDROCHLORIDE 20 MG: 20 TABLET, FILM COATED ORAL at 09:07

## 2021-01-01 RX ADMIN — SODIUM CHLORIDE: 9 INJECTION, SOLUTION INTRAVENOUS at 16:07

## 2021-01-01 RX ADMIN — METRONIDAZOLE 500 MG: 500 INJECTION, SOLUTION INTRAVENOUS at 23:30

## 2021-01-01 RX ADMIN — BUPROPION HYDROCHLORIDE 150 MG: 150 TABLET, EXTENDED RELEASE ORAL at 09:07

## 2021-01-01 RX ADMIN — HYDROMORPHONE HYDROCHLORIDE 1 MG: 1 INJECTION, SOLUTION INTRAMUSCULAR; INTRAVENOUS; SUBCUTANEOUS at 22:02

## 2021-01-01 RX ADMIN — CEFEPIME HYDROCHLORIDE 2 G: 2 INJECTION, POWDER, FOR SOLUTION INTRAVENOUS at 09:57

## 2021-01-01 RX ADMIN — METRONIDAZOLE 500 MG: 500 INJECTION, SOLUTION INTRAVENOUS at 09:57

## 2021-01-01 RX ADMIN — SODIUM CHLORIDE: 9 INJECTION, SOLUTION INTRAVENOUS at 05:35

## 2021-01-01 RX ADMIN — HYDROMORPHONE HYDROCHLORIDE 1 MG: 1 INJECTION, SOLUTION INTRAMUSCULAR; INTRAVENOUS; SUBCUTANEOUS at 13:52

## 2021-01-01 RX ADMIN — BACLOFEN 10 MG: 10 TABLET ORAL at 13:52

## 2021-01-01 RX ADMIN — HYDROMORPHONE HYDROCHLORIDE 1 MG: 1 INJECTION, SOLUTION INTRAMUSCULAR; INTRAVENOUS; SUBCUTANEOUS at 17:55

## 2021-01-01 RX ADMIN — BACLOFEN 10 MG: 10 TABLET ORAL at 09:07

## 2021-01-01 RX ADMIN — METRONIDAZOLE 500 MG: 500 INJECTION, SOLUTION INTRAVENOUS at 16:07

## 2021-01-01 RX ADMIN — BACLOFEN 10 MG: 10 TABLET ORAL at 19:45

## 2021-01-01 RX ADMIN — PANTOPRAZOLE SODIUM 40 MG: 40 TABLET, DELAYED RELEASE ORAL at 16:21

## 2021-01-01 RX ADMIN — HYDROMORPHONE HYDROCHLORIDE 1 MG: 1 INJECTION, SOLUTION INTRAMUSCULAR; INTRAVENOUS; SUBCUTANEOUS at 01:32

## 2021-01-01 RX ADMIN — CEFEPIME HYDROCHLORIDE 2 G: 2 INJECTION, POWDER, FOR SOLUTION INTRAVENOUS at 16:15

## 2021-01-01 RX ADMIN — CEFEPIME HYDROCHLORIDE 2 G: 2 INJECTION, POWDER, FOR SOLUTION INTRAVENOUS at 00:34

## 2021-01-01 RX ADMIN — HYDROXYZINE PAMOATE 50 MG: 50 CAPSULE ORAL at 16:30

## 2021-01-01 RX ADMIN — HYDROMORPHONE HYDROCHLORIDE 1 MG: 1 INJECTION, SOLUTION INTRAMUSCULAR; INTRAVENOUS; SUBCUTANEOUS at 09:48

## 2021-01-01 RX ADMIN — HYDROMORPHONE HYDROCHLORIDE 1 MG: 1 INJECTION, SOLUTION INTRAMUSCULAR; INTRAVENOUS; SUBCUTANEOUS at 05:32

## 2021-01-01 ASSESSMENT — PAIN DESCRIPTION - ORIENTATION: ORIENTATION: LEFT;UPPER

## 2021-01-01 ASSESSMENT — PAIN SCALES - GENERAL
PAINLEVEL_OUTOF10: 8
PAINLEVEL_OUTOF10: 7
PAINLEVEL_OUTOF10: 5
PAINLEVEL_OUTOF10: 7
PAINLEVEL_OUTOF10: 6
PAINLEVEL_OUTOF10: 5
PAINLEVEL_OUTOF10: 8
PAINLEVEL_OUTOF10: 7
PAINLEVEL_OUTOF10: 7

## 2021-01-01 ASSESSMENT — PAIN DESCRIPTION - DESCRIPTORS: DESCRIPTORS: SHARP;ACHING

## 2021-01-01 ASSESSMENT — PAIN DESCRIPTION - LOCATION: LOCATION: ABDOMEN

## 2021-01-01 ASSESSMENT — PAIN DESCRIPTION - PAIN TYPE
TYPE: ACUTE PAIN
TYPE: ACUTE PAIN

## 2021-01-01 ASSESSMENT — PAIN DESCRIPTION - ONSET: ONSET: ON-GOING

## 2021-01-01 ASSESSMENT — PAIN DESCRIPTION - PROGRESSION: CLINICAL_PROGRESSION: GRADUALLY WORSENING

## 2021-01-01 ASSESSMENT — PAIN - FUNCTIONAL ASSESSMENT: PAIN_FUNCTIONAL_ASSESSMENT: PREVENTS OR INTERFERES SOME ACTIVE ACTIVITIES AND ADLS

## 2021-01-01 NOTE — PROGRESS NOTES
First meal off NPO order, clear liquids- patient stated that his abd was the same as before eating, and denied nausea. About 50% of meal was finished, bowel sounds present x4.

## 2021-01-01 NOTE — PROGRESS NOTES
Hospitalist Progress Note      Patient:  Darylene Counter    Unit/Bed:5K-08/008-A  YOB: 1980  MRN: [id number]   Acct: [de-identified]   PCP: MATY Clements CNP  Date of Admission: 12/30/2020    Assessment/Plan:    1. Sepsis likely secondary to colitis  -Criteria met with tachycardia and leukocytosis  -CT abdomen demonstrates colitis, no inciting factor  -Patient given Levaquin, Flagyl in the ER. Continue cefepime and Flagyl (Day #3). Add lactobacillus.  -Patient reports pain is better today than yesterday, continue IV pain control and use conservatively if possible given patient's history of opioid use. Pain management consulted. Sitting on the side of the Risk/benefits discussed including concern for CNS depression and respiratory depression.  -Patient does not have any diarrhea at the moment. - Plan to switch to Cipro & Flagyl PO when tolerating oral intake  -Advanced diet to clear liquids, tolerating well. Advance further as tolerated and continue IV fluids. Bowel rest encouraged. Monitor urinary output  -Decreased urinary output was likely due to dehydration; continue IV fluids and reassess in the morning.     2. FOBT positive/gastroesophageal reflux disease  -Patient had a dark stool earlier today in the setting of colitis, NSAID use  -FOBT positive in the ER. Continue Protonix twice daily, monitor hemoglobin. H&H is stable at the time.  -Symptoms are likely coming from the colitis as well as GERD and NSAID use. He will need follow-up in the outpatient setting versus inpatient pending his course  -Patient is an active smoker. He is also a daily monster energy drink drinker 2 a day. Counseling provided.     3. Chronic back pain  -Plan as above, hold meloxicam, continue baclofen  -Pain management consulted given patient's history and difficult pain control in the ER     4.  Hypertension  -History.   Continue lisinopril.   5. Depression  -Continue Paxil, Wellbutrin, hydroxyzine     6. Tobacco abuse disorder  -Counseling provided    Chief Complaint: Abdominal pain    Initial H and P:-    **Per Chart Review:  \"44 y.o. male who presented to [facility] with 3 day hx of abdominal pain associated with nausea and decreased urinary output. Patient states that he travels for work, eats out, drinks 2 energy drinks a day, does not drink enough fluids. Patient is a daily smoker half a pack a day. Denies alcohol abuse. States that his abdominal pain began 3 days ago with progressive worsening. Pain is located in the epigastric region. Nothing seemed to help his symptoms although he did not try any medications. Food solids and liquids made his pain worse. He has known history of gastroesophageal reflux disease. Patient also takes meloxicam on a daily basis for chronic back pain. He has been on this medication for 1 to 2 months. He has known chronic back pain for which he sees pain management. Previously on opioids but currently treated with injections. In the emergency room, patient was found to have leukocytosis, tachycardia, severe 10 out of 10 abdominal pain, imaging suggestive of colitis. Patient also had decreased urinary output 5 days, he was able to urinate in the ER which was dark yellow and concentrated. Patient had a dark brown stool earlier today. In the ER, his FOBT was positive. He was given Levaquin and Flagyl in the ER as well as morphine and Dilaudid for pain control. Patient was admitted to the [facility] telemetry unit for further management. \"    Subjective (past 24 hours):   Patient sitting on the side of the bed at the time of the interview. States that yesterday but it is definitely still present. He states that the pain medication is helping to control it and that he was able to advance his diet to clear liquids without difficulty.   He was updated on the current plan of care, verbalizes understanding, and had no other needs or questions at this time. Past medical history, family history, social history and allergies reviewed again and is unchanged since admission. ROS (14 point review of systems completed. Pertinent positives noted. Otherwise ROS is negative) :  GENERAL: No fever,chills, or night sweats. SKIN: No lesions or rashes. HEAD: No headaches or recent injury. EYES: No acute changes in vision, no diplopia or blurred vision. EARS: No hearing loss, no tinnitus. NOSE/THROAT: No rhinorrhea or pharyngitis, no nasal drainage. NECK: No lumps or unusual neck stiffness. PULMONARY: Respirations easy and non-labored, no acute distress. CARDIAC: No chest pain, pressure, Negative for lower leg edema. GI: Abdomen is soft and diffusely tender, non-distended. PERIPHERAL VASCULAR: No intermittent claudication or unusual leg cramps. MUSCULOSKELETAL: Occasional arthralgias, myalgias. NEUROLOGICAL: Denies any headache, near syncope, seizures or syncope. HEMATOLOGIC:  No unusual bruising or bleeding. PSYCH: Denies any homicidal or suicidial ideations. Medications:  Reviewed    Infusion Medications    sodium chloride 125 mL/hr at 01/01/21 1607     Scheduled Medications    baclofen  10 mg Oral TID    buPROPion  150 mg Oral QAM    PARoxetine  20 mg Oral Daily    sodium chloride flush  10 mL Intravenous 2 times per day    cefepime  2 g Intravenous Q8H    metroNIDAZOLE  500 mg Intravenous Q8H    pantoprazole  40 mg Oral BID AC     PRN Meds: HYDROmorphone, hydrOXYzine, sodium chloride flush, acetaminophen      Intake/Output Summary (Last 24 hours) at 1/1/2021 1731  Last data filed at 1/1/2021 1619  Gross per 24 hour   Intake 2950 ml   Output 2200 ml   Net 750 ml       Diet:  DIET FULL LIQUID;    Exam:  BP (!) 143/80   Pulse 80   Temp 97.9 °F (36.6 °C) (Oral)   Resp 16   SpO2 96%     General appearance: Alert and appropriate, pleasant adult male.   No apparent distress, appears stated age and cooperative. HEENT: Pupils equal, round, and reactive to light. Conjunctivae/corneas clear. Neck: Supple, with full range of motion. No jugular venous distention. Trachea midline. Respiratory:  Normal respiratory effort. Clear to auscultation, bilaterally without Rales/Wheezes/Rhonchi. Cardiovascular: Regular rate and rhythm with normal S1/S2 without murmurs, rubs or gallops. Abdomen: Soft, mild/diffusely tender, non-distended with normal bowel sounds. Musculoskeletal: Passive and active ROM x 4 extremities. Skin: Skin color, texture, turgor normal.  No rashes or lesions. Neurologic:  Neurovascularly intact without any focal sensory/motor deficits. Cranial nerves: II-XII intact, grossly non-focal.  Psychiatric: Alert and oriented to person, place, time, and situation. Thought content appropriate, normal insight. Flat affect noted. Capillary Refill: Brisk,< 3 seconds   Peripheral Pulses: +2 palpable, equal bilaterally     Labs:   Recent Labs     12/30/20  1815 12/31/20  0108   WBC 16.7* 14.8*   HGB 15.4 13.8*   HCT 47.0 42.8    299     Recent Labs     12/30/20  1815 12/30/20  2221    138   K 4.0 4.1    106   CO2 23 22*   BUN 13 12   CREATININE 0.8 0.8   CALCIUM 8.6 8.2*     Recent Labs     12/30/20  1815 12/30/20  2221   AST 14 15   ALT 22 20   BILITOT 0.2* 0.2*   ALKPHOS 87 78     Recent Labs     12/30/20  1815   INR 1.09     No results for input(s): Malgorzatagm Murguia in the last 72 hours.     Microbiology:    Blood culture #1:   Lab Results   Component Value Date    BC No growth-preliminary  12/30/2020       Blood culture #2:No results found for: Deborah Dela Cruz    Organism:No results found for: ORG    No results found for: LABGRAM    MRSA culture only:No results found for: [facility]    Urine culture: No results found for: LABURIN    Respiratory culture: No results found for: CULTRESP    Aerobic and Anaerobic :  No results found for: LABAERO  No results found for: LABANAE    Urinalysis:      Lab Results   Component Value Date    NITRU NEGATIVE 12/30/2020    WBCUA 0-2 12/30/2020    BACTERIA NONE SEEN 12/30/2020    RBCUA 5-10 12/30/2020    BLOODU NEGATIVE 12/30/2020    GLUCOSEU NEGATIVE 12/30/2020       Radiology:  CT ABDOMEN PELVIS W IV CONTRAST Additional Contrast? None   Final Result   Apparent thickening of the colon greatest in the transverse colon concerning for colitis. Correlation with symptoms is advised. Underlying diverticular disease. **This report has been created using voice recognition software. It may contain minor errors which are inherent in voice recognition technology. **      Final report electronically signed by Dr. Mayorga Boards on 12/30/2020 7:50 PM        Ct Abdomen Pelvis W Iv Contrast Additional Contrast? None    Result Date: 12/30/2020  PROCEDURE: CT ABDOMEN PELVIS W IV CONTRAST CLINICAL INFORMATION: epigastric pain . COMPARISON: None. TECHNIQUE: 5 mm axial CT images were obtained through the abdomen and pelvis after the administration of intravenous and oral contrast. Coronal and sagittal reconstructions were obtained. All CT scans at this facility use dose modulation, iterative reconstruction, and/or weight-based dosing when appropriate to reduce radiation dose to as low as reasonably achievable. FINDINGS: The lung bases are clear. Spleen pancreas gallbladder adrenal glands liver kidneys are unremarkable no hydronephrosis. Normal caliber abdominal aorta. Scattered stool in the colon with few colonic diverticuli. Mild apparent thickening of the colon for concerning for mild colitis. This is greatest in the transverse colon. Normal appendix. . No acute osseous findings. Apparent thickening of the colon greatest in the transverse colon concerning for colitis. Correlation with symptoms is advised. Underlying diverticular disease. **This report has been created using voice recognition software.  It may contain minor errors which are inherent in voice recognition technology. ** Final report electronically signed by Dr. Sury Brandt on 12/30/2020 7:50 PM      **This report has been created using voice recognition software. It may contain minor errors which are inherent in voice recognition technology. **  Electronically signed by MATY Rangel CNP on 1/1/2021 at 5:31 PM

## 2021-01-01 NOTE — PROGRESS NOTES
Hospitalist Progress Note      Patient:  Jamey Ceballos    Unit/Bed:5K-08/008-A  YOB: 1980  MRN: [id number]   Acct: [de-identified]   PCP: MATY Burrell CNP  Date of Admission: 12/30/2020    Assessment/Plan:    1. Sepsis likely secondary to colitis  -Criteria met with tachycardia and leukocytosis  -CT abdomen demonstrates colitis, no inciting factor  -Patient given Levaquin, Flagyl in the ER. Continue cefepime and Flagyl (Day #2)  -IV pain control ordered. Discussed pain management given patient's history of opioid use. Risk/benefits discussed including concern for CNS depression and respiratory depression.  -Patient does not have any diarrhea at the moment. -Reassess medications in 24 hours. Can switch to Cipro & Flagyl PO when tolerating oral intake  -Continue NPO status for now, continue IV fluids. Bowel rest encouraged. Plan to advance diet tomorrow. Monitor urinary output  -Decreased urinary output was likely due to dehydration; continue aggressive IV fluids and reassess in the morning.     2. FOBT positive/gastroesophageal reflux disease  -Patient had a dark stool earlier today in the setting of colitis, NSAID use  -FOBT positive in the ER. Continue Protonix twice daily, monitor hemoglobin. H&H is stable at the time.  -Symptoms are likely coming from the colitis as well as GERD and NSAID use. He will need follow-up in the outpatient setting versus inpatient pending his course  -Patient is an active smoker. He is also a daily monster energy drink drinker 2 a day. Counseling provided.     3. Chronic back pain  -Plan as above, hold meloxicam, continue baclofen  -Pain management consulted given patient's history and difficult pain control in the ER     4.  Hypertension  -Hold lisinopril as blood pressures on the low normal side     5. Depression  -Continue Paxil, Wellbutrin, hydroxyzine     6.   Tobacco abuse disorder  -Counseling provided    Chief Complaint: Abdominal pain    Initial H and P:-    **Per Chart Review:  \"44 y.o. male who presented to [facility] with 3 day hx of abdominal pain associated with nausea and decreased urinary output. Patient states that he travels for work, eats out, drinks 2 energy drinks a day, does not drink enough fluids. Patient is a daily smoker half a pack a day. Denies alcohol abuse. States that his abdominal pain began 3 days ago with progressive worsening. Pain is located in the epigastric region. Nothing seemed to help his symptoms although he did not try any medications. Food solids and liquids made his pain worse. He has known history of gastroesophageal reflux disease. Patient also takes meloxicam on a daily basis for chronic back pain. He has been on this medication for 1 to 2 months. He has known chronic back pain for which he sees pain management. Previously on opioids but currently treated with injections. In the emergency room, patient was found to have leukocytosis, tachycardia, severe 10 out of 10 abdominal pain, imaging suggestive of colitis. Patient also had decreased urinary output 5 days, he was able to urinate in the ER which was dark yellow and concentrated. Patient had a dark brown stool earlier today. In the ER, his FOBT was positive. He was given Levaquin and Flagyl in the ER as well as morphine and Dilaudid for pain control. Patient was admitted to the [facility] telemetry unit for further management. \"    Subjective (past 24 hours):   Patient resting in bed at the time of the interview. States that he is still having abdominal pain but it is more intermittent than constant. He states that the pain medication is helping to control it. He was updated on the current plan of care, verbalizes understanding, and had no other needs or questions at this time.     Past medical history, family history, social history and allergies reviewed again and is unchanged since admission. ROS (14 point review of systems completed. Pertinent positives noted. Otherwise ROS is negative) :  GENERAL: No fever,chills, or night sweats. SKIN: No lesions or rashes. HEAD: No headaches or recent injury. EYES: No acute changes in vision, no diplopia or blurred vision. EARS: No hearing loss, no tinnitus. NOSE/THROAT: No rhinorrhea or pharyngitis, no nasal drainage. NECK: No lumps or unusual neck stiffness. PULMONARY: Respirations easy and non-labored, no acute distress. CARDIAC: No chest pain, pressure, Negative for lower leg edema. GI: Abdomen is soft and diffusely tender, non-distended. PERIPHERAL VASCULAR: No intermittent claudication or unusual leg cramps. MUSCULOSKELETAL: Occasional arthralgias, myalgias. NEUROLOGICAL: Denies any headache, near syncope, seizures or syncope. HEMATOLOGIC:  No unusual bruising or bleeding. PSYCH: Denies any homicidal or suicidial ideations. Medications:  Reviewed    Infusion Medications    sodium chloride 125 mL/hr at 12/31/20 1131     Scheduled Medications    baclofen  10 mg Oral TID    buPROPion  150 mg Oral QAM    PARoxetine  20 mg Oral Daily    sodium chloride flush  10 mL Intravenous 2 times per day    cefepime  2 g Intravenous Q8H    metroNIDAZOLE  500 mg Intravenous Q8H    pantoprazole  40 mg Oral BID AC     PRN Meds: HYDROmorphone, hydrOXYzine, sodium chloride flush, acetaminophen      Intake/Output Summary (Last 24 hours) at 12/31/2020 2004  Last data filed at 12/31/2020 1312  Gross per 24 hour   Intake 1506 ml   Output 600 ml   Net 906 ml       Diet:  Diet NPO Effective Now    Exam:  /70   Pulse 83   Temp 98.3 °F (36.8 °C) (Oral)   Resp 16   SpO2 97%     General appearance: Alert and appropriate, pleasant adult male. No apparent distress, appears stated age and cooperative. HEENT: Pupils equal, round, and reactive to light. Conjunctivae/corneas clear. Neck: Supple, with full range of motion.  No jugular venous distention. Trachea midline. Respiratory:  Normal respiratory effort. Clear to auscultation, bilaterally without Rales/Wheezes/Rhonchi. Cardiovascular: Regular rate and rhythm with normal S1/S2 without murmurs, rubs or gallops. Abdomen: Soft, mild/diffusely tender, non-distended with normal bowel sounds. Musculoskeletal: Passive and active ROM x 4 extremities. Skin: Skin color, texture, turgor normal.  No rashes or lesions. Neurologic:  Neurovascularly intact without any focal sensory/motor deficits. Cranial nerves: II-XII intact, grossly non-focal.  Psychiatric: Alert and oriented to person, place, time, and situation. Thought content appropriate, normal insight. Flat affect noted. Capillary Refill: Brisk,< 3 seconds   Peripheral Pulses: +2 palpable, equal bilaterally     Labs:   Recent Labs     12/30/20  1815 12/31/20  0108   WBC 16.7* 14.8*   HGB 15.4 13.8*   HCT 47.0 42.8    299     Recent Labs     12/30/20  1815 12/30/20  2221    138   K 4.0 4.1    106   CO2 23 22*   BUN 13 12   CREATININE 0.8 0.8   CALCIUM 8.6 8.2*     Recent Labs     12/30/20  1815 12/30/20  2221   AST 14 15   ALT 22 20   BILITOT 0.2* 0.2*   ALKPHOS 87 78     Recent Labs     12/30/20  1815   INR 1.09     No results for input(s): Veronica Baseman in the last 72 hours.     Microbiology:    Blood culture #1:   Lab Results   Component Value Date    BC No growth-preliminary  12/30/2020       Blood culture #2:No results found for: Idelia Mould    Organism:No results found for: ORG    No results found for: LABGRAM    MRSA culture only:No results found for: [facility]    Urine culture: No results found for: LABURIN    Respiratory culture: No results found for: CULTRESP    Aerobic and Anaerobic :  No results found for: LABAERO  No results found for: LABANAE    Urinalysis:      Lab Results   Component Value Date    NITRU NEGATIVE 12/30/2020    WBCUA 0-2 12/30/2020    BACTERIA NONE SEEN 12/30/2020    RBCUA 5-10 12/30/2020    BLOODU NEGATIVE 12/30/2020    GLUCOSEU NEGATIVE 12/30/2020       Radiology:  CT ABDOMEN PELVIS W IV CONTRAST Additional Contrast? None   Final Result   Apparent thickening of the colon greatest in the transverse colon concerning for colitis. Correlation with symptoms is advised. Underlying diverticular disease. **This report has been created using voice recognition software. It may contain minor errors which are inherent in voice recognition technology. **      Final report electronically signed by Dr. Hoang Mejia on 12/30/2020 7:50 PM        Ct Abdomen Pelvis W Iv Contrast Additional Contrast? None    Result Date: 12/30/2020  PROCEDURE: CT ABDOMEN PELVIS W IV CONTRAST CLINICAL INFORMATION: epigastric pain . COMPARISON: None. TECHNIQUE: 5 mm axial CT images were obtained through the abdomen and pelvis after the administration of intravenous and oral contrast. Coronal and sagittal reconstructions were obtained. All CT scans at this facility use dose modulation, iterative reconstruction, and/or weight-based dosing when appropriate to reduce radiation dose to as low as reasonably achievable. FINDINGS: The lung bases are clear. Spleen pancreas gallbladder adrenal glands liver kidneys are unremarkable no hydronephrosis. Normal caliber abdominal aorta. Scattered stool in the colon with few colonic diverticuli. Mild apparent thickening of the colon for concerning for mild colitis. This is greatest in the transverse colon. Normal appendix. . No acute osseous findings. Apparent thickening of the colon greatest in the transverse colon concerning for colitis. Correlation with symptoms is advised. Underlying diverticular disease. **This report has been created using voice recognition software. It may contain minor errors which are inherent in voice recognition technology. ** Final report electronically signed by Dr. Hoang Mejia on 12/30/2020 7:50 PM      **This report has been created using voice recognition software.  It

## 2021-01-02 LAB
ANION GAP SERPL CALCULATED.3IONS-SCNC: 10 MEQ/L (ref 8–16)
BUN BLDV-MCNC: 10 MG/DL (ref 7–22)
CALCIUM SERPL-MCNC: 8.7 MG/DL (ref 8.5–10.5)
CHLORIDE BLD-SCNC: 106 MEQ/L (ref 98–111)
CO2: 25 MEQ/L (ref 23–33)
CREAT SERPL-MCNC: 0.9 MG/DL (ref 0.4–1.2)
ERYTHROCYTE [DISTWIDTH] IN BLOOD BY AUTOMATED COUNT: 14.9 % (ref 11.5–14.5)
ERYTHROCYTE [DISTWIDTH] IN BLOOD BY AUTOMATED COUNT: 45.9 FL (ref 35–45)
GFR SERPL CREATININE-BSD FRML MDRD: > 90 ML/MIN/1.73M2
GLUCOSE BLD-MCNC: 125 MG/DL (ref 70–108)
HCT VFR BLD CALC: 43 % (ref 42–52)
HEMOGLOBIN: 14 GM/DL (ref 14–18)
MCH RBC QN AUTO: 27.7 PG (ref 26–33)
MCHC RBC AUTO-ENTMCNC: 32.6 GM/DL (ref 32.2–35.5)
MCV RBC AUTO: 85.1 FL (ref 80–94)
PLATELET # BLD: 308 THOU/MM3 (ref 130–400)
PMV BLD AUTO: 9 FL (ref 9.4–12.4)
POTASSIUM REFLEX MAGNESIUM: 4.3 MEQ/L (ref 3.5–5.2)
RBC # BLD: 5.05 MILL/MM3 (ref 4.7–6.1)
SODIUM BLD-SCNC: 141 MEQ/L (ref 135–145)
WBC # BLD: 8.6 THOU/MM3 (ref 4.8–10.8)

## 2021-01-02 PROCEDURE — 6370000000 HC RX 637 (ALT 250 FOR IP): Performed by: NURSE PRACTITIONER

## 2021-01-02 PROCEDURE — 6360000002 HC RX W HCPCS: Performed by: FAMILY MEDICINE

## 2021-01-02 PROCEDURE — 85027 COMPLETE CBC AUTOMATED: CPT

## 2021-01-02 PROCEDURE — 2500000003 HC RX 250 WO HCPCS: Performed by: FAMILY MEDICINE

## 2021-01-02 PROCEDURE — 6360000002 HC RX W HCPCS: Performed by: PHYSICIAN ASSISTANT

## 2021-01-02 PROCEDURE — 2580000003 HC RX 258: Performed by: FAMILY MEDICINE

## 2021-01-02 PROCEDURE — 94761 N-INVAS EAR/PLS OXIMETRY MLT: CPT

## 2021-01-02 PROCEDURE — 80048 BASIC METABOLIC PNL TOTAL CA: CPT

## 2021-01-02 PROCEDURE — 6370000000 HC RX 637 (ALT 250 FOR IP): Performed by: FAMILY MEDICINE

## 2021-01-02 PROCEDURE — 99232 SBSQ HOSP IP/OBS MODERATE 35: CPT | Performed by: NURSE PRACTITIONER

## 2021-01-02 PROCEDURE — 6360000002 HC RX W HCPCS: Performed by: NURSE PRACTITIONER

## 2021-01-02 PROCEDURE — 1200000003 HC TELEMETRY R&B

## 2021-01-02 PROCEDURE — 36415 COLL VENOUS BLD VENIPUNCTURE: CPT

## 2021-01-02 RX ORDER — MORPHINE SULFATE 2 MG/ML
2 INJECTION, SOLUTION INTRAMUSCULAR; INTRAVENOUS EVERY 4 HOURS PRN
Status: DISCONTINUED | OUTPATIENT
Start: 2021-01-02 | End: 2021-01-03

## 2021-01-02 RX ORDER — LEVOFLOXACIN 500 MG/1
500 TABLET, FILM COATED ORAL DAILY
Status: DISCONTINUED | OUTPATIENT
Start: 2021-01-02 | End: 2021-01-03 | Stop reason: HOSPADM

## 2021-01-02 RX ORDER — PREDNISONE 10 MG/1
10 TABLET ORAL DAILY
Status: DISCONTINUED | OUTPATIENT
Start: 2021-01-06 | End: 2021-01-03 | Stop reason: HOSPADM

## 2021-01-02 RX ORDER — PREDNISONE 20 MG/1
40 TABLET ORAL DAILY
Status: COMPLETED | OUTPATIENT
Start: 2021-01-02 | End: 2021-01-03

## 2021-01-02 RX ORDER — PREDNISONE 20 MG/1
20 TABLET ORAL DAILY
Status: DISCONTINUED | OUTPATIENT
Start: 2021-01-04 | End: 2021-01-03 | Stop reason: HOSPADM

## 2021-01-02 RX ORDER — MORPHINE SULFATE 4 MG/ML
4 INJECTION, SOLUTION INTRAMUSCULAR; INTRAVENOUS EVERY 4 HOURS PRN
Status: DISCONTINUED | OUTPATIENT
Start: 2021-01-02 | End: 2021-01-03

## 2021-01-02 RX ADMIN — BACLOFEN 10 MG: 10 TABLET ORAL at 09:13

## 2021-01-02 RX ADMIN — HYDROXYZINE PAMOATE 50 MG: 50 CAPSULE ORAL at 13:31

## 2021-01-02 RX ADMIN — BACLOFEN 10 MG: 10 TABLET ORAL at 20:38

## 2021-01-02 RX ADMIN — Medication 1 CAPSULE: at 08:22

## 2021-01-02 RX ADMIN — LEVOFLOXACIN 500 MG: 500 TABLET, FILM COATED ORAL at 13:32

## 2021-01-02 RX ADMIN — PANTOPRAZOLE SODIUM 40 MG: 40 TABLET, DELAYED RELEASE ORAL at 06:18

## 2021-01-02 RX ADMIN — MORPHINE SULFATE 2 MG: 2 INJECTION, SOLUTION INTRAMUSCULAR; INTRAVENOUS at 09:11

## 2021-01-02 RX ADMIN — BACLOFEN 10 MG: 10 TABLET ORAL at 13:37

## 2021-01-02 RX ADMIN — PAROXETINE HYDROCHLORIDE 20 MG: 20 TABLET, FILM COATED ORAL at 09:13

## 2021-01-02 RX ADMIN — HYDROXYZINE PAMOATE 50 MG: 50 CAPSULE ORAL at 02:18

## 2021-01-02 RX ADMIN — LISINOPRIL 10 MG: 10 TABLET ORAL at 09:13

## 2021-01-02 RX ADMIN — METRONIDAZOLE 500 MG: 500 INJECTION, SOLUTION INTRAVENOUS at 09:13

## 2021-01-02 RX ADMIN — MORPHINE SULFATE 4 MG: 4 INJECTION, SOLUTION INTRAMUSCULAR; INTRAVENOUS at 17:52

## 2021-01-02 RX ADMIN — MORPHINE SULFATE 2 MG: 2 INJECTION, SOLUTION INTRAMUSCULAR; INTRAVENOUS at 13:31

## 2021-01-02 RX ADMIN — BUPROPION HYDROCHLORIDE 150 MG: 150 TABLET, EXTENDED RELEASE ORAL at 09:13

## 2021-01-02 RX ADMIN — HYDROMORPHONE HYDROCHLORIDE 1 MG: 1 INJECTION, SOLUTION INTRAMUSCULAR; INTRAVENOUS; SUBCUTANEOUS at 02:14

## 2021-01-02 RX ADMIN — CEFEPIME HYDROCHLORIDE 2 G: 2 INJECTION, POWDER, FOR SOLUTION INTRAVENOUS at 09:13

## 2021-01-02 RX ADMIN — PREDNISONE 40 MG: 20 TABLET ORAL at 13:32

## 2021-01-02 RX ADMIN — CEFEPIME HYDROCHLORIDE 2 G: 2 INJECTION, POWDER, FOR SOLUTION INTRAVENOUS at 02:14

## 2021-01-02 RX ADMIN — MORPHINE SULFATE 4 MG: 4 INJECTION, SOLUTION INTRAMUSCULAR; INTRAVENOUS at 21:49

## 2021-01-02 RX ADMIN — PANTOPRAZOLE SODIUM 40 MG: 40 TABLET, DELAYED RELEASE ORAL at 17:51

## 2021-01-02 ASSESSMENT — PAIN DESCRIPTION - ORIENTATION: ORIENTATION: LOWER;RIGHT

## 2021-01-02 ASSESSMENT — PAIN DESCRIPTION - PROGRESSION
CLINICAL_PROGRESSION: NOT CHANGED

## 2021-01-02 ASSESSMENT — PAIN SCALES - GENERAL
PAINLEVEL_OUTOF10: 8
PAINLEVEL_OUTOF10: 5
PAINLEVEL_OUTOF10: 7

## 2021-01-02 ASSESSMENT — PAIN DESCRIPTION - FREQUENCY: FREQUENCY: CONTINUOUS

## 2021-01-02 ASSESSMENT — PAIN DESCRIPTION - LOCATION: LOCATION: ABDOMEN

## 2021-01-02 ASSESSMENT — PAIN DESCRIPTION - PAIN TYPE: TYPE: ACUTE PAIN

## 2021-01-02 NOTE — PROGRESS NOTES
Hospitalist Progress Note      Patient:  Jeri Ribera    Unit/Bed:5K-08/008-A  YOB: 1980  MRN: [id number]   Acct: [de-identified]   PCP: MATY Blanco CNP  Date of Admission: 12/30/2020    Assessment/Plan:    1. Sepsis likely secondary to colitis  -Criteria met with tachycardia and leukocytosis  -CT abdomen demonstrates colitis, no inciting factor  -Patient reports pain is better today than yesterday, decrease IV pain control and use conservatively if possible given patient's history of opioid use. Pain management consulted. -Patient does not have any diarrhea at the moment, continue lactobacillus  -Decreased urinary output was likely due to dehydration; continue IV fluids and reassess in the morning.  -Patient given Levaquin, Flagyl in the ER. Patient received 3 days of IV cefepime and Flagyl. Tolerating oral intake better, transition antibiotic coverage to Levaquin 500 mg PO (Day #1 of 5). Add short course of steroids to assist in decreasing inflammation, start prednisone 40 mg PO today and tomorrow followed by 20 mg for 2 days, followed by 10 mg for 2 days for a total of 6 days of steroid coverage. -Advanced diet from full liquid to low residual. Advance further as tolerated and continue IV fluids. Bowel rest encouraged. Monitor urinary output     2. FOBT positive/gastroesophageal reflux disease  -Patient had a dark stool earlier today in the setting of colitis, NSAID use  -FOBT positive in the ER. Continue Protonix twice daily, monitor hemoglobin. H&H is stable at the time.  -Symptoms are likely coming from the colitis as well as GERD and NSAID use. He will need follow-up in the outpatient setting versus inpatient pending his course  -Patient is an active smoker. He is also a daily monster energy drink drinker 2 a day. Counseling provided.     3.   Chronic back pain  -Plan as above, hold meloxicam, continue baclofen  -Pain management consulted given patient's history and difficult pain control in the ER     4.  Hypertension  -History. Continue lisinopril.   5.  Depression  -Continue Paxil, Wellbutrin, hydroxyzine     6. Tobacco abuse disorder  -Counseling provided    Chief Complaint: Abdominal pain    Initial H and P:-    **Per Chart Review:  \"44 y.o. male who presented to [facility] with 3 day hx of abdominal pain associated with nausea and decreased urinary output. Patient states that he travels for work, eats out, drinks 2 energy drinks a day, does not drink enough fluids. Patient is a daily smoker half a pack a day. Denies alcohol abuse. States that his abdominal pain began 3 days ago with progressive worsening. Pain is located in the epigastric region. Nothing seemed to help his symptoms although he did not try any medications. Food solids and liquids made his pain worse. He has known history of gastroesophageal reflux disease. Patient also takes meloxicam on a daily basis for chronic back pain. He has been on this medication for 1 to 2 months. He has known chronic back pain for which he sees pain management. Previously on opioids but currently treated with injections. In the emergency room, patient was found to have leukocytosis, tachycardia, severe 10 out of 10 abdominal pain, imaging suggestive of colitis. Patient also had decreased urinary output 5 days, he was able to urinate in the ER which was dark yellow and concentrated. Patient had a dark brown stool earlier today. In the ER, his FOBT was positive. He was given Levaquin and Flagyl in the ER as well as morphine and Dilaudid for pain control. Patient was admitted to the [facility] telemetry unit for further management. \"    Subjective (past 24 hours):   Patient sitting up in bed at the time of the interview. Reports the pain is better today than it was yesterday and he has been able to tolerate an oral liquid diet without difficulty.   He states that the pain medication is helping to control it and that he was able to advance his diet to clear liquids without difficulty. He was updated on the current plan of care, verbalizes understanding, and had no other needs or questions at this time. Past medical history, family history, social history and allergies reviewed again and is unchanged since admission. ROS (14 point review of systems completed. Pertinent positives noted. Otherwise ROS is negative) :  GENERAL: No fever,chills, or night sweats. SKIN: No lesions or rashes. HEAD: No headaches or recent injury. EYES: No acute changes in vision, no diplopia or blurred vision. EARS: No hearing loss, no tinnitus. NOSE/THROAT: No rhinorrhea or pharyngitis, no nasal drainage. NECK: No lumps or unusual neck stiffness. PULMONARY: Respirations easy and non-labored, no acute distress. CARDIAC: No chest pain, pressure, Negative for lower leg edema. GI: Abdomen is soft and diffusely tender, non-distended. PERIPHERAL VASCULAR: No intermittent claudication or unusual leg cramps. MUSCULOSKELETAL: Occasional arthralgias, myalgias. NEUROLOGICAL: Denies any headache, near syncope, seizures or syncope. HEMATOLOGIC:  No unusual bruising or bleeding. PSYCH: Denies any homicidal or suicidial ideations.     Medications:  Reviewed    Infusion Medications    sodium chloride 125 mL/hr at 01/01/21 1607     Scheduled Medications    lactobacillus  1 capsule Oral Daily with breakfast    lisinopril  10 mg Oral Daily    baclofen  10 mg Oral TID    buPROPion  150 mg Oral QAM    PARoxetine  20 mg Oral Daily    sodium chloride flush  10 mL Intravenous 2 times per day    cefepime  2 g Intravenous Q8H    metroNIDAZOLE  500 mg Intravenous Q8H    pantoprazole  40 mg Oral BID AC     PRN Meds: morphine **OR** morphine, hydrOXYzine, sodium chloride flush, acetaminophen      Intake/Output Summary (Last 24 hours) at 1/2/2021 1009  Last data filed at 1/2/2021 0510  Gross per 24 hour   Intake 3952.55 ml   Output 2400 ml   Net 1552.55 ml       Diet:  DIET LOW FIBER; Exam:  /82   Pulse 80   Temp 98 °F (36.7 °C) (Oral)   Resp 17   SpO2 98%     General appearance: Alert and appropriate, pleasant adult male. No apparent distress, appears stated age and cooperative. HEENT: Pupils equal, round, and reactive to light. Conjunctivae/corneas clear. Neck: Supple, with full range of motion. No jugular venous distention. Trachea midline. Respiratory:  Normal respiratory effort. Clear to auscultation, bilaterally without Rales/Wheezes/Rhonchi. Cardiovascular: Regular rate and rhythm with normal S1/S2 without murmurs, rubs or gallops. Abdomen: Soft, mild/diffusely tender, non-distended with normal bowel sounds. Musculoskeletal: Passive and active ROM x 4 extremities. Skin: Skin color, texture, turgor normal.  No rashes or lesions. Neurologic:  Neurovascularly intact without any focal sensory/motor deficits. Cranial nerves: II-XII intact, grossly non-focal.  Psychiatric: Alert and oriented to person, place, time, and situation. Thought content appropriate, normal insight. Flat affect noted. Capillary Refill: Brisk,< 3 seconds   Peripheral Pulses: +2 palpable, equal bilaterally     Labs:   Recent Labs     12/30/20  1815 12/31/20  0108 01/02/21  0518   WBC 16.7* 14.8* 8.6   HGB 15.4 13.8* 14.0   HCT 47.0 42.8 43.0    299 308     Recent Labs     12/30/20  1815 12/30/20  2221 01/02/21  0518    138 141   K 4.0 4.1 4.3    106 106   CO2 23 22* 25   BUN 13 12 10   CREATININE 0.8 0.8 0.9   CALCIUM 8.6 8.2* 8.7     Recent Labs     12/30/20  1815 12/30/20  2221   AST 14 15   ALT 22 20   BILITOT 0.2* 0.2*   ALKPHOS 87 78     Recent Labs     12/30/20 1815   INR 1.09     No results for input(s): CKTOTAL, TROPONINI in the last 72 hours.     Microbiology:    Blood culture #1:   Lab Results   Component Value Date    BC No growth-preliminary  12/30/2020       Blood culture #2:No results found for: Idelia Mould    Organism:No results found for: ORG    No results found for: LABGRAM    MRSA culture only:No results found for: [facility]    Urine culture: No results found for: LABURIN    Respiratory culture: No results found for: CULTRESP    Aerobic and Anaerobic :  No results found for: LABAERO  No results found for: LABANAE    Urinalysis:      Lab Results   Component Value Date    NITRU NEGATIVE 01/01/2021    WBCUA 0-2 12/30/2020    BACTERIA NONE SEEN 12/30/2020    RBCUA 5-10 12/30/2020    BLOODU NEGATIVE 01/01/2021    GLUCOSEU 100 01/01/2021       Radiology:  CT ABDOMEN PELVIS W IV CONTRAST Additional Contrast? None   Final Result   Apparent thickening of the colon greatest in the transverse colon concerning for colitis. Correlation with symptoms is advised. Underlying diverticular disease. **This report has been created using voice recognition software. It may contain minor errors which are inherent in voice recognition technology. **      Final report electronically signed by Dr. Ade Mcneill on 12/30/2020 7:50 PM        Ct Abdomen Pelvis W Iv Contrast Additional Contrast? None    Result Date: 12/30/2020  PROCEDURE: CT ABDOMEN PELVIS W IV CONTRAST CLINICAL INFORMATION: epigastric pain . COMPARISON: None. TECHNIQUE: 5 mm axial CT images were obtained through the abdomen and pelvis after the administration of intravenous and oral contrast. Coronal and sagittal reconstructions were obtained. All CT scans at this facility use dose modulation, iterative reconstruction, and/or weight-based dosing when appropriate to reduce radiation dose to as low as reasonably achievable. FINDINGS: The lung bases are clear. Spleen pancreas gallbladder adrenal glands liver kidneys are unremarkable no hydronephrosis. Normal caliber abdominal aorta. Scattered stool in the colon with few colonic diverticuli. Mild apparent thickening of the colon for concerning for mild colitis.   This is greatest in the transverse colon. Normal appendix. . No acute osseous findings. Apparent thickening of the colon greatest in the transverse colon concerning for colitis. Correlation with symptoms is advised. Underlying diverticular disease. **This report has been created using voice recognition software. It may contain minor errors which are inherent in voice recognition technology. ** Final report electronically signed by Dr. Aura Shafer on 12/30/2020 7:50 PM      **This report has been created using voice recognition software. It may contain minor errors which are inherent in voice recognition technology. **  Electronically signed by MATY Weinstein CNP on 1/2/2021 at 10:09 AM

## 2021-01-03 VITALS
TEMPERATURE: 98 F | RESPIRATION RATE: 18 BRPM | SYSTOLIC BLOOD PRESSURE: 117 MMHG | HEART RATE: 71 BPM | OXYGEN SATURATION: 96 % | DIASTOLIC BLOOD PRESSURE: 81 MMHG

## 2021-01-03 PROCEDURE — 6370000000 HC RX 637 (ALT 250 FOR IP): Performed by: FAMILY MEDICINE

## 2021-01-03 PROCEDURE — 6360000002 HC RX W HCPCS: Performed by: NURSE PRACTITIONER

## 2021-01-03 PROCEDURE — 6370000000 HC RX 637 (ALT 250 FOR IP): Performed by: NURSE PRACTITIONER

## 2021-01-03 PROCEDURE — 99239 HOSP IP/OBS DSCHRG MGMT >30: CPT | Performed by: NURSE PRACTITIONER

## 2021-01-03 PROCEDURE — 2580000003 HC RX 258: Performed by: FAMILY MEDICINE

## 2021-01-03 RX ORDER — PANTOPRAZOLE SODIUM 40 MG/1
40 TABLET, DELAYED RELEASE ORAL
Qty: 60 TABLET | Refills: 0 | Status: SHIPPED | OUTPATIENT
Start: 2021-01-03 | End: 2022-06-29

## 2021-01-03 RX ORDER — HYDROCODONE BITARTRATE AND ACETAMINOPHEN 5; 325 MG/1; MG/1
1 TABLET ORAL EVERY 6 HOURS PRN
Status: DISCONTINUED | OUTPATIENT
Start: 2021-01-03 | End: 2021-01-03 | Stop reason: HOSPADM

## 2021-01-03 RX ORDER — PREDNISONE 20 MG/1
20 TABLET ORAL DAILY
Qty: 2 TABLET | Refills: 0 | Status: SHIPPED | OUTPATIENT
Start: 2021-01-04 | End: 2021-01-06

## 2021-01-03 RX ORDER — PREDNISONE 10 MG/1
10 TABLET ORAL DAILY
Qty: 2 TABLET | Refills: 0 | Status: SHIPPED | OUTPATIENT
Start: 2021-01-06 | End: 2021-01-06

## 2021-01-03 RX ORDER — LEVOFLOXACIN 500 MG/1
500 TABLET, FILM COATED ORAL DAILY
Qty: 3 TABLET | Refills: 0 | Status: SHIPPED | OUTPATIENT
Start: 2021-01-04 | End: 2021-01-07

## 2021-01-03 RX ORDER — LACTOBACILLUS RHAMNOSUS GG 10B CELL
1 CAPSULE ORAL
Qty: 30 CAPSULE | Refills: 0 | Status: SHIPPED | OUTPATIENT
Start: 2021-01-04 | End: 2021-02-03

## 2021-01-03 RX ADMIN — LEVOFLOXACIN 500 MG: 500 TABLET, FILM COATED ORAL at 08:02

## 2021-01-03 RX ADMIN — LISINOPRIL 10 MG: 10 TABLET ORAL at 08:01

## 2021-01-03 RX ADMIN — SODIUM CHLORIDE, PRESERVATIVE FREE 10 ML: 5 INJECTION INTRAVENOUS at 08:02

## 2021-01-03 RX ADMIN — BACLOFEN 10 MG: 10 TABLET ORAL at 08:01

## 2021-01-03 RX ADMIN — PREDNISONE 40 MG: 20 TABLET ORAL at 08:01

## 2021-01-03 RX ADMIN — MORPHINE SULFATE 4 MG: 4 INJECTION, SOLUTION INTRAMUSCULAR; INTRAVENOUS at 01:50

## 2021-01-03 RX ADMIN — HYDROXYZINE PAMOATE 50 MG: 50 CAPSULE ORAL at 01:50

## 2021-01-03 RX ADMIN — PANTOPRAZOLE SODIUM 40 MG: 40 TABLET, DELAYED RELEASE ORAL at 07:00

## 2021-01-03 RX ADMIN — PAROXETINE HYDROCHLORIDE 20 MG: 20 TABLET, FILM COATED ORAL at 08:01

## 2021-01-03 RX ADMIN — Medication 1 CAPSULE: at 08:01

## 2021-01-03 RX ADMIN — BUPROPION HYDROCHLORIDE 150 MG: 150 TABLET, EXTENDED RELEASE ORAL at 08:01

## 2021-01-03 RX ADMIN — MORPHINE SULFATE 4 MG: 4 INJECTION, SOLUTION INTRAMUSCULAR; INTRAVENOUS at 08:11

## 2021-01-03 RX ADMIN — SODIUM CHLORIDE: 9 INJECTION, SOLUTION INTRAVENOUS at 08:02

## 2021-01-03 ASSESSMENT — PAIN DESCRIPTION - PROGRESSION
CLINICAL_PROGRESSION: NOT CHANGED
CLINICAL_PROGRESSION: NOT CHANGED

## 2021-01-03 ASSESSMENT — PAIN SCALES - GENERAL
PAINLEVEL_OUTOF10: 9
PAINLEVEL_OUTOF10: 7

## 2021-01-03 ASSESSMENT — PAIN DESCRIPTION - DESCRIPTORS: DESCRIPTORS: STABBING

## 2021-01-03 ASSESSMENT — PAIN DESCRIPTION - ONSET: ONSET: ON-GOING

## 2021-01-03 NOTE — DISCHARGE INSTR - DIET
 Good nutrition is important when healing from an illness, injury, or surgery. Follow any nutrition recommendations given to you during your hospital stay.  If you were given an oral nutrition supplement while in the hospital, continue to take this supplement at home. You can take it with meals, in-between meals, and/or before bedtime. These supplements can be purchased at most local grocery stores, pharmacies, and chain super-stores.  If you have any questions about your diet or nutrition, call the hospital and ask for the dietitian. Low-Fiber Diet: Care Instructions  Your Care Instructions     When your bowels are irritated, you may need to limit fiber in your diet until the problem clears up. Your doctor and dietitian can help you design a low-fiber diet based on your health and what you prefer to eat. Ask your doctor how long you should stay on a low-fiber diet. Your doctor probably will have you start adding more fiber to your diet as you get better. Always talk with your doctor or dietitian before you make changes in your diet. Follow-up care is a key part of your treatment and safety. Be sure to make and go to all appointments, and call your doctor if you are having problems. It's also a good idea to know your test results and keep a list of the medicines you take. How can you care for yourself at home? · Choose white or refined grains, and avoid whole grains. That means eating white or refined cereals, breads, crackers, rice, or pasta. · Peel the skin from fruits and vegetables before you eat or cook them. Avoid eating skins, seeds, and hulls. ? Eat frozen or canned fruit. Low-fiber fruits include applesauce, ripe bananas, and fruit juice without pulp. ? Eat low-fiber vegetables, which include well-cooked vegetables and vegetable juice. · Drink or eat milk, yogurt, or other milk products, if you can digest dairy without too many problems.  Your doctor may limit milk and milk products for a while. If so, he or she may recommend a calcium and vitamin D supplement. · Eat well-cooked meat, such as chicken, turkey, fish, or lean meat. You also can eat eggs and tofu. · Avoid these foods:  ? Bran, brown or wild rice, oatmeal, granola, corn, jeanine crackers, barley, and whole wheat and other whole-grain breads, such as rye bread  ? Cereals with more than 3 grams of fiber a serving  ? Berries, rhubarb, prunes, prune juice, and all dried fruits  ? Raw vegetables  ? Cabbage, broccoli, brussels sprouts, and cauliflower  ? Cooked dried beans, lentils, and split peas  ? Crunchy peanut butter  ? Ice cream with fruit pieces in it  ? Coconut, nuts, popcorn, raisins, and seeds, or any ice cream, yogurt, or cheese with these in them  Where can you learn more? Go to [email or web address] org and sign in to your [employer] account. Enter B047 in the [employer] box to learn more about \"Low-Fiber Diet: Care Instructions. \"     If you do not have an account, please click on the \"Sign Up Now\" link. Current as of: August 22, 2019               Content Version: 12.6  © 2458-5340 [employer]. Care instructions adapted under license by [facility] ([facility]). If you have questions about a medical condition or this instruction, always ask your healthcare professional. [place] [address] any warranty or liability for your use of this information.

## 2021-01-03 NOTE — CARE COORDINATION
1/3/21, 10:46 AM EST    Patient goals/plan/ treatment preferences discussed by  and . Patient goals/plan/ treatment preferences reviewed with patient/ family. Patient/ family verbalize understanding of discharge plan and are in agreement with goal/plan/treatment preferences. Understanding was demonstrated using the teach back method. AVS provided by RN at time of discharge, which includes all necessary medical information pertaining to the patients current course of illness, treatment, post-discharge goals of care, and treatment preferences. Planning home today. No needs.     Electronically signed by Dustin Jennings RN on 1/3/2021 at 10:46 AM

## 2021-01-03 NOTE — DISCHARGE SUMMARY
Hospitalist Discharge Summary        Patient: Funmi Smith  YOB: 1980  MRN: [id number]   Acct: [de-identified]    Primary Care Physician: MATY Pendleton - CNP    Admit date  12/30/2020    Discharge date:  1/3/2021 9:52 AM    Chief Complaint on presentation :-  Abdominal pain    Discharge Assessment and Plan:-     1.  Sepsis likely secondary to colitis  -Criteria met with tachycardia and leukocytosis  -CT abdomen demonstrates colitis, no inciting factor  -Patient reports pain is better today than yesterday, decrease IV pain control and use conservatively if possible given patient's history of opioid use. Pain management consulted. -Patient does not have any diarrhea at the moment, continue lactobacillus  -Decreased urinary output was likely due to dehydration; continue IV fluids and reassess in the morning.  -Patient given Levaquin, Flagyl in the ER. Patient received 3 days of IV cefepime, 2 days of Flagyl, and 3 days of Levaquin. Patient also was given a short course of steroids to help decrease inflammation. -Advanced diet without difficulty, endorses significant improvement in abdominal pain from when he initially presented. -Recommendations for close PCP follow-up with possible GI evaluation if issue persists.     2.  FOBT positive/gastroesophageal reflux disease  -Patient had a dark stool earlier today in the setting of colitis, NSAID use  -FOBT positive in the ER.  Continue Protonix twice daily. H&H is stable at the time.  -Symptoms are likely coming from the colitis as well as GERD and NSAID use. [facility] will need follow-up in the outpatient setting  -Patient is an active smoker. [facility] is also a daily monster energy drink drinker 2 a day. Renetta Junior provided.     3.  Chronic back pain  -Recommendations to follow-up outpatient with pain management     4.  Hypertension  -History.   Continue lisinopril at discharge  5.  Depression  -Continue Paxil, Wellbutrin, hydroxyzine at discharge     6.  Tobacco abuse disorder  -Counseling provided    Initial H and P and Hospital course:-  **Per Chart Review:  \"40 y. o. male who presented to [address] [place] [address] with 3 day hx of abdominal pain associated with nausea and decreased urinary output.  Patient states that he travels for work, eats out, drinks 2 energy drinks a day, does not drink enough fluids.  Patient is a daily smoker half a pack a day.  Denies alcohol abuse.  States that his abdominal pain began 3 days ago with progressive worsening.  Pain is located in the epigastric region.  Nothing seemed to help his symptoms although he did not try any medications.  Food solids and liquids made his pain worse. Ingrid Witt has known history of gastroesophageal reflux disease.  Patient also takes meloxicam on a daily basis for chronic back pain.  He has been on this medication for 1 to 2 months. He has known chronic back pain for which he sees pain management.  Previously on opioids but currently treated with injections.  In the emergency room, patient was found to have leukocytosis, tachycardia, severe 10 out of 10 abdominal pain, imaging suggestive of colitis.  Patient also had decreased urinary output 5 days, he was able to urinate in the ER which was dark yellow and concentrated.  Patient had a dark brown stool earlier today.  In the ER, his FOBT was positive. He was given Levaquin and Flagyl in the ER as well as morphine and Dilaudid for pain control. Patient was admitted to the [facility] telemetry unit for further management. \"    During the patient's stay, he was evaluated and treated for acute colitis and responded well to antibiotic therapy, gentle IV fluids, and short course of steroids.   The patient's diet was successfully advanced after bowel rest was provided and the patient endorsed significant improvement from when he initially presented to the hospital.  At the time of discharge, patient appears well and is alert and oriented to person, insight  11. Capillary Refill: Brisk,< 3 seconds   12. Peripheral Pulses: +2 palpable, equal bilaterally     Discharge Medications:-      Medication List      START taking these medications    lactobacillus capsule  Take 1 capsule by mouth daily (with breakfast)  Start taking on: January 4, 2021     levoFLOXacin 500 MG tablet  Commonly known as: LEVAQUIN  Take 1 tablet by mouth daily for 3 days  Start taking on: January 4, 2021     pantoprazole 40 MG tablet  Commonly known as: PROTONIX  Take 1 tablet by mouth 2 times daily (before meals)     * predniSONE 20 MG tablet  Commonly known as: DELTASONE  Take 1 tablet by mouth daily for 2 days  Start taking on: January 4, 2021     * predniSONE 10 MG tablet  Commonly known as: DELTASONE  Take 1 tablet by mouth daily for 2 days  Start taking on: January 6, 2021         * This list has 2 medication(s) that are the same as other medications prescribed for you. Read the directions carefully, and ask your doctor or other care provider to review them with you. CONTINUE taking these medications    baclofen 10 MG tablet  Commonly known as: LIORESAL  Take 1 tablet by mouth 3 times daily     buPROPion 150 MG extended release tablet  Commonly known as:  Wellbutrin XL  Take 1 tablet by mouth every morning     hydrOXYzine 50 MG capsule  Commonly known as: VISTARIL  Take 1 capsule by mouth 3 times daily as needed for Anxiety     lisinopril 10 MG tablet  Commonly known as: PRINIVIL;ZESTRIL  Take 1 tablet by mouth daily     meloxicam 15 MG tablet  Commonly known as: MOBIC  Take 1 tablet by mouth daily     PARoxetine 20 MG tablet  Commonly known as: PAXIL  Take 1 tablet by mouth daily           Where to Get Your Medications      These medications were sent to [facility] #[id number] - [place], [place] - [address] - P [phone] - F [phone]  . [place], [place] [address]    Phone: [phone]   · lactobacillus capsule  · levoFLOXacin 500 MG tablet  · pantoprazole 40 MG tablet  · predniSONE 10 MG tablet  · predniSONE 20 MG tablet          Labs :-  Recent Results (from the past 72 hour(s))   Urine rt reflex to culture    Collection Time: 01/01/21  6:50 PM    Specimen: Urine, clean catch   Result Value Ref Range    Glucose, Ur 100 (A) NEGATIVE mg/dl    Bilirubin Urine NEGATIVE NEGATIVE    Ketones, Urine 15 (A) NEGATIVE    Specific Gravity, Urine 1.008 1.002 - 1.030    Blood, Urine NEGATIVE NEGATIVE    pH, UA 5.5 5.0 - 9.0    Protein, UA NEGATIVE NEGATIVE    Urobilinogen, Urine 0.2 0.0 - 1.0 eu/dl    Nitrite, Urine NEGATIVE NEGATIVE    Leukocyte Esterase, Urine NEGATIVE NEGATIVE    Color, UA YELLOW STRAW-YELLOW    Character, Urine CLEAR CLEAR-SL CLOUD   CBC    Collection Time: 01/02/21  5:18 AM   Result Value Ref Range    WBC 8.6 4.8 - 10.8 thou/mm3    RBC 5.05 4.70 - 6.10 mill/mm3    Hemoglobin 14.0 14.0 - 18.0 gm/dl    Hematocrit 43.0 42.0 - 52.0 %    MCV 85.1 80.0 - 94.0 fL    MCH 27.7 26.0 - 33.0 pg    MCHC 32.6 32.2 - 35.5 gm/dl    RDW-CV 14.9 (H) 11.5 - 14.5 %    RDW-SD 45.9 (H) 35.0 - 45.0 fL    Platelets 993 688 - 679 thou/mm3    MPV 9.0 (L) 9.4 - 12.4 fL   Basic Metabolic Panel w/ Reflex to MG    Collection Time: 01/02/21  5:18 AM   Result Value Ref Range    Sodium 141 135 - 145 meq/L    Potassium reflex Magnesium 4.3 3.5 - 5.2 meq/L    Chloride 106 98 - 111 meq/L    CO2 25 23 - 33 meq/L    Glucose 125 (H) 70 - 108 mg/dL    BUN 10 7 - 22 mg/dL    CREATININE 0.9 0.4 - 1.2 mg/dL    Calcium 8.7 8.5 - 10.5 mg/dL   Anion Gap    Collection Time: 01/02/21  5:18 AM   Result Value Ref Range    Anion Gap 10.0 8.0 - 16.0 meq/L   Glomerular Filtration Rate, Estimated    Collection Time: 01/02/21  5:18 AM   Result Value Ref Range    Est, Glom Filt Rate >90 ml/min/1.73m2        Microbiology:    Blood culture #1:   Lab Results   Component Value Date    BC No growth-preliminary  12/30/2020       Blood culture #2:No results found for: BLOODCULT2    Organism:  No results found for: LABGRAM    MRSA culture only:No results found for: [facility]    Urine culture: No results found for: LABURIN  No results found for: ORG     Respiratory culture: No results found for: CULTRESP    Aerobic and Anaerobic :  No results found for: LABAERO  No results found for: LABANAE    Urinalysis:      Lab Results   Component Value Date    NITRU NEGATIVE 01/01/2021    WBCUA 0-2 12/30/2020    BACTERIA NONE SEEN 12/30/2020    RBCUA 5-10 12/30/2020    BLOODU NEGATIVE 01/01/2021    GLUCOSEU 100 01/01/2021       Radiology:-  Ct Abdomen Pelvis W Iv Contrast Additional Contrast? None    Result Date: 12/30/2020  PROCEDURE: CT ABDOMEN PELVIS W IV CONTRAST CLINICAL INFORMATION: epigastric pain . COMPARISON: None. TECHNIQUE: 5 mm axial CT images were obtained through the abdomen and pelvis after the administration of intravenous and oral contrast. Coronal and sagittal reconstructions were obtained. All CT scans at this facility use dose modulation, iterative reconstruction, and/or weight-based dosing when appropriate to reduce radiation dose to as low as reasonably achievable. FINDINGS: The lung bases are clear. Spleen pancreas gallbladder adrenal glands liver kidneys are unremarkable no hydronephrosis. Normal caliber abdominal aorta. Scattered stool in the colon with few colonic diverticuli. Mild apparent thickening of the colon for concerning for mild colitis. This is greatest in the transverse colon. Normal appendix. . No acute osseous findings. Apparent thickening of the colon greatest in the transverse colon concerning for colitis. Correlation with symptoms is advised. Underlying diverticular disease. **This report has been created using voice recognition software. It may contain minor errors which are inherent in voice recognition technology. ** Final report electronically signed by Dr. Paty Flores on 12/30/2020 7:50 PM    Follow-up scheduled after discharge :-    in 1-2 weeks with MATY Ventura - CNP    Consultations during this hospital stay:-  [x] NONE [] Cardiology  [] Nephrology  [] Hemo onco   [] GI   [] ID  [] Endocrine  [] Pulm    [] Neuro    [] Psych   [] Urology  [] ENT   [] G SURGERY   []Ortho    []CV surg    [] Palliative  [] Hospice [] Pain management   []    []TCU   [] PT/OT  OTHERS:-    Disposition: home  Condition at Discharge: Stable    Time Spent:- 35 minutes    **This report has been created using voice recognition software. It may contain minor errors which are inherent in voice recognition technology. **  Electronically signed by Virgia Landau, APRN - CNP on 1/3/2021 at 9:52 AM  Discharging Hospitalist

## 2021-01-05 ENCOUNTER — TELEPHONE (OUTPATIENT)
Dept: FAMILY MEDICINE CLINIC | Age: 41
End: 2021-01-05

## 2021-01-05 LAB
BLOOD CULTURE, ROUTINE: NORMAL
BLOOD CULTURE, ROUTINE: NORMAL

## 2021-01-05 NOTE — TELEPHONE ENCOUNTER
[facility] Transitions Initial Follow Up Call    Outreach made within 2 business days of discharge: Yes    Patient: Hu Monique Patient : 1980   MRN: [id number]  Reason for Admission: acute colitis Discharge Date: 1/3/21       Spoke with: left message to call back    Discharge department/facility: [facility] Interactive Patient Contact:  Was patient able to fill all prescriptions:   Was patient instructed to bring all medications to the follow-up visit:   Is patient taking all medications as directed in the discharge summary?    Does patient understand their discharge instructions:   Does patient have questions or concerns that need addressed prior to 7-14 day follow up office visit:     Scheduled appointment with PCP within 7-14 days    Follow Up  Future Appointments   Date Time Provider Nick Naranjo   2021  1:00 PM Amol Steve, [address] [place] [address], RN

## 2021-01-05 NOTE — TELEPHONE ENCOUNTER
Pt scheduled appt for 1/6/21 back on 12/24/20 for other reasons. Does he want to keep appt? Reschedule?   Currently not scheduled for hosp f/up appt

## 2021-01-06 ENCOUNTER — OFFICE VISIT (OUTPATIENT)
Dept: FAMILY MEDICINE CLINIC | Age: 41
End: 2021-01-06
Payer: COMMERCIAL

## 2021-01-06 VITALS
RESPIRATION RATE: 16 BRPM | TEMPERATURE: 96.4 F | HEART RATE: 88 BPM | BODY MASS INDEX: 35.64 KG/M2 | WEIGHT: 248.4 LBS | SYSTOLIC BLOOD PRESSURE: 136 MMHG | DIASTOLIC BLOOD PRESSURE: 84 MMHG

## 2021-01-06 DIAGNOSIS — G89.29 CHRONIC THORACIC BACK PAIN, UNSPECIFIED BACK PAIN LATERALITY: ICD-10-CM

## 2021-01-06 DIAGNOSIS — Z87.19 HISTORY OF COLITIS: Primary | ICD-10-CM

## 2021-01-06 DIAGNOSIS — I10 ESSENTIAL HYPERTENSION: ICD-10-CM

## 2021-01-06 DIAGNOSIS — R39.12 WEAK URINARY STREAM: ICD-10-CM

## 2021-01-06 DIAGNOSIS — M54.50 LUMBAR PAIN: ICD-10-CM

## 2021-01-06 DIAGNOSIS — M54.6 CHRONIC THORACIC BACK PAIN, UNSPECIFIED BACK PAIN LATERALITY: ICD-10-CM

## 2021-01-06 DIAGNOSIS — R30.0 DYSURIA: ICD-10-CM

## 2021-01-06 PROCEDURE — 99214 OFFICE O/P EST MOD 30 MIN: CPT | Performed by: NURSE PRACTITIONER

## 2021-01-06 ASSESSMENT — ENCOUNTER SYMPTOMS
SHORTNESS OF BREATH: 0
BLOOD IN STOOL: 0
EYES NEGATIVE: 1
CHEST TIGHTNESS: 0
BACK PAIN: 1
ABDOMINAL PAIN: 1

## 2021-01-06 NOTE — PROGRESS NOTES
[facility]  [facility]  Dept: [phone]  Dept Fax: [phone]    MARCOS Tobar is a 45 y.o.male      Pt presents for follow up of recent hospitalization. Pt admitted for dates: 12/30/20-1/3/2021    Presenting Complaint: Abdominal pain    Discharge Diagnosis / Discharge Summary:   1.  Sepsis likely secondary to colitis  -Criteria met with tachycardia and leukocytosis  -CT abdomen demonstrates colitis, no inciting factor  -Patient reports pain is better today than yesterday, decrease IV pain control and use conservatively if possible given patient's history of opioid use. Pain management consulted. -Patient does not have any diarrhea at the moment, continue lactobacillus  -Decreased urinary output was likely due to dehydration; continue IV fluids and reassess in the morning.  -Patient given Levaquin, Flagyl in the ER. Patient received 3 days of IV cefepime and Flagyl. Tolerating oral intake better, transition antibiotic coverage to Levaquin 500 mg PO (Day #1 of 5). Add short course of steroids to assist in decreasing inflammation, start prednisone 40 mg PO today and tomorrow followed by 20 mg for 2 days, followed by 10 mg for 2 days for a total of 6 days of steroid coverage. -Advanced diet from full liquid to low residual. Advance further as tolerated and continue IV fluids. Bowel rest encouraged. Monitor urinary output     2.  FOBT positive/gastroesophageal reflux disease  -Patient had a dark stool earlier today in the setting of colitis, NSAID use  -FOBT positive in the ER.  Continue Protonix twice daily, monitor hemoglobin.  H&H is stable at the time.  -Symptoms are likely coming from the colitis as well as GERD and NSAID use. Jacinta Fan will need follow-up in the outpatient setting versus inpatient pending his course  -Patient is an active smoker. Jacinta Fan is also a daily monster energy drink drinker 2 a day.  Counseling provided.     3.  Chronic back pain -Plan as above, hold meloxicam, continue baclofen  -Pain management consulted given patient's history and difficult pain control in the ER     4.  Hypertension  -History. Continue lisinopril.   5.  Depression  -Continue Paxil, Wellbutrin, hydroxyzine     6.  Tobacco abuse disorder  -Counseling provided          Patient still has some left upper quad abd pain. No urge to have a BM. He has not been evaluated by GI and does not have any follow up with them. Appetite is okay per patient. He continues to have dysuria as well as weak stream, this has never changed. Urine studies have been normal.  Patient continues with chronic back pain. Baclofen is not helping. Would like to get set up with new pain management. Review of Systems   Constitutional: Negative for chills, fatigue, fever and unexpected weight change. HENT: Negative. Eyes: Negative. Respiratory: Negative for chest tightness and shortness of breath. Cardiovascular: Negative for chest pain, palpitations and leg swelling. Gastrointestinal: Positive for abdominal pain (ULQ). Negative for blood in stool. Genitourinary: Positive for dysuria. Weak stream   Musculoskeletal: Positive for back pain (mid to lower). Negative for joint swelling. Skin: Negative for rash. Neurological: Negative for dizziness. Psychiatric/Behavioral: Negative. All other systems reviewed and are negative. OBJECTIVE     /84   Pulse 88   Temp 96.4 °F (35.8 °C)   Resp 16   Wt 248 lb 6.4 oz (112.7 kg)   BMI 35.64 kg/m²       Physical Exam  Vitals signs and nursing note reviewed. Constitutional:       Appearance: He is well-developed. HENT:      Head: Normocephalic and atraumatic. Right Ear: External ear normal.      Left Ear: External ear normal.      Nose: Nose normal.   Eyes:      Conjunctiva/sclera: Conjunctivae normal.      Pupils: Pupils are equal, round, and reactive to light.    Neck: Musculoskeletal: Normal range of motion and neck supple. Cardiovascular:      Rate and Rhythm: Normal rate and regular rhythm. Pulmonary:      Effort: Pulmonary effort is normal.      Breath sounds: Normal breath sounds. Abdominal:      General: Bowel sounds are normal.      Palpations: Abdomen is soft. Musculoskeletal: Normal range of motion. Skin:     General: Skin is warm and dry. Neurological:      Mental Status: He is alert and oriented to person, place, and time. Deep Tendon Reflexes: Reflexes are normal and symmetric. Psychiatric:         Mood and Affect: Affect is tearful. Behavior: Behavior normal.         Thought Content:  Thought content normal.         Judgment: Judgment normal.           Component      Latest Ref Rng & Units 1/2/2021 1/2/2021 1/2/2021 1/2/2021           5:18 AM  5:18 AM  5:18 AM  5:18 AM   Glucose, UA      NEGATIVE mg/dl       Bilirubin, Urine      NEGATIVE       Ketones, Urine      NEGATIVE       Specific Gravity, Urine      1.002 - 1.030       Blood, Urine      NEGATIVE       pH, UA      5.0 - 9.0       Protein, UA      NEGATIVE       Urobilinogen, Urine      0.0 - 1.0 eu/dl       Nitrite, Urine      NEGATIVE       Leukocyte Esterase, Urine      NEGATIVE       Color, UA      STRAW-YELLOW       Character, Urine      CLEAR-SL CLOUD       WBC      4.8 - 10.8 thou/mm3    8.6   RBC      4.70 - 6.10 mill/mm3    5.05   Hemoglobin Quant      14.0 - 18.0 gm/dl    14.0   Hematocrit      42.0 - 52.0 %    43.0   MCV      80.0 - 94.0 fL    85.1   MCH      26.0 - 33.0 pg    27.7   MCHC      32.2 - 35.5 gm/dl    32.6   RDW-CV      11.5 - 14.5 %    14.9 (H)   RDW-SD      35.0 - 45.0 fL    45.9 (H)   Platelet Count      334 - 400 thou/mm3    308   MPV      9.4 - 12.4 fL    9.0 (L)   Sodium      135 - 145 meq/L   141    Potassium      3.5 - 5.2 meq/L   4.3    Chloride      98 - 111 meq/L   106    CO2      23 - 33 meq/L   25    Glucose      70 - 108 mg/dL   125 (H)    BUN 7 - 22 mg/dL   10    Creatinine      0.4 - 1.2 mg/dL   0.9    Calcium      8.5 - 10.5 mg/dL   8.7    Anion Gap      8.0 - 16.0 meq/L  10.0     Est, Glom Filt Rate      ml/min/1.73m2 >90        Component      Latest Ref Rng & Units 1/1/2021           6:50 PM   Glucose, UA      NEGATIVE mg/dl 100 (A)   Bilirubin, Urine      NEGATIVE NEGATIVE   Ketones, Urine      NEGATIVE 15 (A)   Specific Gravity, Urine      1.002 - 1.030 1.008   Blood, Urine      NEGATIVE NEGATIVE   pH, UA      5.0 - 9.0 5.5   Protein, UA      NEGATIVE NEGATIVE   Urobilinogen, Urine      0.0 - 1.0 eu/dl 0.2   Nitrite, Urine      NEGATIVE NEGATIVE   Leukocyte Esterase, Urine      NEGATIVE NEGATIVE   Color, UA      STRAW-YELLOW YELLOW   Character, Urine      CLEAR-SL CLOUD CLEAR   WBC      4.8 - 10.8 thou/mm3    RBC      4.70 - 6.10 mill/mm3    Hemoglobin Quant      14.0 - 18.0 gm/dl    Hematocrit      42.0 - 52.0 %    MCV      80.0 - 94.0 fL    MCH      26.0 - 33.0 pg    MCHC      32.2 - 35.5 gm/dl    RDW-CV      11.5 - 14.5 %    RDW-SD      35.0 - 45.0 fL    Platelet Count      551 - 400 thou/mm3    MPV      9.4 - 12.4 fL    Sodium      135 - 145 meq/L    Potassium      3.5 - 5.2 meq/L    Chloride      98 - 111 meq/L    CO2      23 - 33 meq/L    Glucose      70 - 108 mg/dL    BUN      7 - 22 mg/dL    Creatinine      0.4 - 1.2 mg/dL    Calcium      8.5 - 10.5 mg/dL    Anion Gap      8.0 - 16.0 meq/L    Est, Glom Filt Rate      ml/min/1.73m2      Lab Results   Component Value Date    WBC 8.6 01/02/2021    HGB 14.0 01/02/2021    HCT 43.0 01/02/2021    MCV 85.1 01/02/2021     01/02/2021     Lab Results   Component Value Date    CHOL 226 (H) 06/17/2020    TRIG 225 (H) 06/17/2020    HDL 46 06/17/2020    LDLCALC 135 06/17/2020     Lab Results   Component Value Date     01/02/2021    K 4.3 01/02/2021     01/02/2021    CO2 25 01/02/2021    BUN 10 01/02/2021    CREATININE 0.9 01/02/2021    GLUCOSE 125 (H) 01/02/2021 CALCIUM 8.7 01/02/2021    PROT 6.1 12/30/2020    LABALBU 3.1 (L) 12/30/2020    BILITOT 0.2 (L) 12/30/2020    ALKPHOS 78 12/30/2020    AST 15 12/30/2020    ALT 20 12/30/2020    LABGLOM >90 01/02/2021     Lab Results   Component Value Date    TSH 2.540 06/17/2020       Narrative   PROCEDURE: CT ABDOMEN PELVIS W IV CONTRAST       CLINICAL INFORMATION: epigastric pain .       COMPARISON: None.       TECHNIQUE: 5 mm axial CT images were obtained through the abdomen and pelvis after the administration of intravenous and oral contrast. Coronal and sagittal reconstructions were obtained.       All CT scans at this facility use dose modulation, iterative reconstruction, and/or weight-based dosing when appropriate to reduce radiation dose to as low as reasonably achievable.       FINDINGS: The lung bases are clear. Spleen pancreas gallbladder adrenal glands liver kidneys are unremarkable no hydronephrosis. Normal caliber abdominal aorta. Scattered stool in the colon with few colonic diverticuli. Mild apparent thickening of the colon for concerning for mild colitis.    This is greatest in the transverse colon. Normal appendix. . No acute osseous findings.               Impression   Apparent thickening of the colon greatest in the transverse colon concerning for colitis. Correlation with symptoms is advised. Underlying diverticular disease.               **This report has been created using voice recognition software. It may contain minor errors which are inherent in voice recognition technology. **       Final report electronically signed by Dr. Mary Majano on 12/30/2020 7:50 PM         ASSESSMENT           Diagnosis Orders   1. History of colitis  Amb External Referral To Gastroenterology   2. [address] [place] [address] Urology   3. Weak urinary stream  [facility]. [facility] Urology   4. Essential hypertension     5. Lumbar pain     6.  Chronic thoracic back pain, unspecified back pain laterality             PLAN Pt to schedule with Dr Gabriel Agee for increasing back pain  Will call and see if Dr Elly Talavera would be willing to take over care for pain management   Referral to GI  Referral to urology for ongoing urinary symptoms  Follow up in 6-8 weeks        Electronically signed by MATY Greer CNP on 1/6/2021 at 4:16 PM

## 2021-01-06 NOTE — PATIENT INSTRUCTIONS
Patient Education        Colitis: Care Instructions  Your Care Instructions  Colitis is the medical term for swelling (inflammation) of the intestine. It can be caused by different things, such as an infection or loss of blood flow in the intestine. Other causes are problems like Crohn's disease or ulcerative colitis. Symptoms may include fever, diarrhea that may be bloody, or belly pain. Sometimes symptoms go away without treatment. But you may need treatment or more tests, such as blood tests or a stool test. Or you may need imaging tests like a CT scan or a colonoscopy. In some cases, the doctor may want to test a sample of tissue from the intestine. This test is called a biopsy. The doctor has checked you carefully, but problems can develop later. If you notice any problems or new symptoms, get medical treatment right away. Follow-up care is a key part of your treatment and safety. Be sure to make and go to all appointments, and call your doctor if you are having problems. It's also a good idea to know your test results and keep a list of the medicines you take. How can you care for yourself at home? · Rest until you feel better. · Your doctor may recommend that you eat bland foods. These include rice, dry toast or crackers, bananas, and applesauce. · To prevent dehydration, drink plenty of fluids. Choose water and other caffeine-free clear liquids until you feel better. If you have kidney, heart, or liver disease and have to limit fluids, talk with your doctor before you increase the amount of fluids you drink. · Be safe with medicines. Take your medicines exactly as prescribed. Call your doctor if you think you are having a problem with your medicine. You will get more details on the specific medicines your doctor prescribes. When should you call for help? Call 911 anytime you think you may need emergency care. For example, call if:    · You passed out (lost consciousness).   · Your stools are maroon or very bloody. Call your doctor now or seek immediate medical care if:    · You have new or worse belly pain.     · You have a fever.     · You are vomiting.     · You cannot pass stools or gas.     · You have new or more blood in your stools. Watch closely for changes in your health, and be sure to contact your doctor if:    · You have new or worse symptoms.     · You are losing weight.     · You do not get better as expected. Where can you learn more? Go to [email or web address] [email or web address].Dishcrawl. org and sign in to your [employer] account. Enter U448 in the [employer] box to learn more about \"Colitis: Care Instructions. \"     If you do not have an account, please click on the \"Sign Up Now\" link. Current as of: April 15, 2020               Content Version: 12.6  © 1048-7742 [employer]. Care instructions adapted under license by [facility] ([facility]). If you have questions about a medical condition or this instruction, always ask your healthcare professional. [address] any warranty or liability for your use of this information.

## 2021-01-12 RX ORDER — HYDROXYZINE PAMOATE 50 MG/1
50 CAPSULE ORAL 3 TIMES DAILY PRN
Qty: 90 CAPSULE | Refills: 0 | Status: SHIPPED | OUTPATIENT
Start: 2021-01-12 | End: 2021-02-20 | Stop reason: SDUPTHER

## 2021-01-12 RX ORDER — HYDROXYZINE PAMOATE 50 MG/1
50 CAPSULE ORAL 3 TIMES DAILY PRN
Qty: 90 CAPSULE | Refills: 0 | OUTPATIENT
Start: 2021-01-12

## 2021-01-12 RX ORDER — BUPROPION HYDROCHLORIDE 150 MG/1
150 TABLET ORAL EVERY MORNING
Qty: 90 TABLET | Refills: 3 | Status: SHIPPED | OUTPATIENT
Start: 2021-01-12 | End: 2021-08-20 | Stop reason: SDUPTHER

## 2021-01-12 RX ORDER — PAROXETINE HYDROCHLORIDE 20 MG/1
20 TABLET, FILM COATED ORAL DAILY
Qty: 90 TABLET | Refills: 3 | Status: SHIPPED | OUTPATIENT
Start: 2021-01-12 | End: 2021-08-20 | Stop reason: SDUPTHER

## 2021-01-12 RX ORDER — BUPROPION HYDROCHLORIDE 150 MG/1
150 TABLET ORAL EVERY MORNING
Qty: 30 TABLET | Refills: 1 | OUTPATIENT
Start: 2021-01-12

## 2021-01-12 RX ORDER — PAROXETINE HYDROCHLORIDE 20 MG/1
20 TABLET, FILM COATED ORAL DAILY
Qty: 90 TABLET | Refills: 3 | OUTPATIENT
Start: 2021-01-12

## 2021-01-12 RX ORDER — BACLOFEN 10 MG/1
10 TABLET ORAL 3 TIMES DAILY
Qty: 90 TABLET | Refills: 0 | Status: SHIPPED | OUTPATIENT
Start: 2021-01-12 | End: 2021-02-11

## 2021-01-12 NOTE — TELEPHONE ENCOUNTER
Called over and spoke with Mary Lou at pain management. She is going to put in a call to the providers about having the pt get a second opinion.

## 2021-01-12 NOTE — TELEPHONE ENCOUNTER
Meds sent. At last appt, pt was referred to Dr Guanako Forman (currently with Dr Geovanni Elmore). Sharon White did call and speak with their office and they said they would handle it, but I do not see an appt in the chart. Could you please check on this?  Thanks, TS

## 2021-01-19 ENCOUNTER — NURSE ONLY (OUTPATIENT)
Dept: LAB | Age: 41
End: 2021-01-19

## 2021-01-20 ENCOUNTER — OFFICE VISIT (OUTPATIENT)
Dept: PHYSICAL MEDICINE AND REHAB | Age: 41
End: 2021-01-20
Payer: COMMERCIAL

## 2021-01-20 VITALS
DIASTOLIC BLOOD PRESSURE: 84 MMHG | BODY MASS INDEX: 35.5 KG/M2 | SYSTOLIC BLOOD PRESSURE: 136 MMHG | HEIGHT: 70 IN | WEIGHT: 248 LBS

## 2021-01-20 DIAGNOSIS — M79.18 MYOFASCIAL PAIN: ICD-10-CM

## 2021-01-20 DIAGNOSIS — G89.4 CHRONIC PAIN SYNDROME: ICD-10-CM

## 2021-01-20 DIAGNOSIS — M54.59 LUMBAR FACET JOINT PAIN: Primary | ICD-10-CM

## 2021-01-20 DIAGNOSIS — M47.816 LUMBAR SPONDYLOSIS: ICD-10-CM

## 2021-01-20 PROCEDURE — 99215 OFFICE O/P EST HI 40 MIN: CPT | Performed by: ANESTHESIOLOGY

## 2021-01-20 RX ORDER — PREGABALIN 75 MG/1
75 CAPSULE ORAL EVERY EVENING
Qty: 30 CAPSULE | Refills: 2 | Status: SHIPPED | OUTPATIENT
Start: 2021-01-20 | End: 2021-02-03 | Stop reason: SINTOL

## 2021-01-20 RX ORDER — TIZANIDINE 4 MG/1
4 TABLET ORAL EVERY 8 HOURS PRN
Qty: 90 TABLET | Refills: 2 | Status: SHIPPED | OUTPATIENT
Start: 2021-01-20 | End: 2021-04-15

## 2021-01-20 NOTE — PATIENT INSTRUCTIONS
The following treatment recommendations and plan were discussed in detail with Nicolle Tobar. Imaging:   I have reviewed patients imaging of MRI L-spine (report) and results were discussed with patient today. Analgesics:   Patient is taking Acetaminophen. Patient informed that the maximum amount of acetaminophen taken on a regular basis should only be 4000 mg per day. Adjuvants: In light of the presence of a neuropathic component of pain, we will start low dose Lyrica 75 mg at night. For continued chronic pain with associated musculoskeletal component, we will start     Interventions: In presence of lumbar axial back pain and with physical exam consistent for facetal pain, the option of medial branch blocks at bilateral L4/L5 and L5/S1 was chosen. The risks and benefits were discussed in detail with the patient. Patient wants to proceed with the injection. Anticoagulation/NPO Recommendations:   Patient does not need to hold any medications prior to the procedure. Patient will need to be NPO x 8 hours prior to the procedure. We will start an IV prior to the procedure. Multidisciplinary Pain Management:   In the presence of complex, chronic, and multi-factorial pain, the importance of a multidisciplinary approach to pain management in the patients management regimen was emphasized and discussed in great detail.    PHYSICAL THERAPY:  I would advise the patient    Referrals:  None    Prescriptions Written This Visit:   Tizanidine   Lyrica     Follow-up: For lumbar MBBs

## 2021-01-20 NOTE — PROGRESS NOTES
Chronic Pain Clinic Note     Encounter Date: 1/20/21    Subjective:   Chief Complaint:   Chief Complaint   Patient presents with    Follow-up     back pain        History of Present Illness:   Jaylan Guerin is a 45 y.o. male seen in the Pain Clinic on 01/20/21 for his history of chronic low back pain. He has a medical history of anxiety/depression and diverticulitis. He has been dealing with low back pain for the last 4 years after he fell off a roof onto a ladder. He describes his pain is primarily in the axial back with some radiation down into bilateral buttocks. He rates his pain a constant 7/10 aching, stabbing pain. His pain is worse when he is upright or doing any activities particularly working. He works as a  . His pain is improved with rest.  He denies any focal leg weakness, new paresthesias, saddle anesthesia, bowel/bladder incontinence. He states his pain is interfering with his everyday activities including work and sleep. Of note, he was previously seen Dr. James Palafox and underwent diagnostic lumbar medial branch blocks on 11/18/2020 in which he reports benefit for a couple hours. He was previously taking tramadol and Flexeril with no benefit. He underwent physical therapy within the last year with no benefit as well.     History of Interventions:   Surgery: No previous back surgeries  Injections: Lumbar MBBs (11/18/20) - noted benefit    Current Treatment Medications:   Paxil - anxiety/depression   Wellbutrin - anxiety/depression  Baclofen 10 mg 3 times daily as needed    Historical Treatment Medications:   NSAIDs - unable to take due to diverticulitis  Flexeril - no benefit  Tramadol - no benefit    Imaging:  MRI L-spine (7/14/20)        Past Medical History:   Diagnosis Date    Chronic back pain     GERD (gastroesophageal reflux disease)     History of high blood pressure     Hypertension        Past Surgical History:   Procedure Laterality Date  PAIN MANAGEMENT PROCEDURE Bilateral 11/18/2020    L3, L4 medial branch and L5 dorsal rami injection, bilateral performed by Mike Wyman MD at [address] [facility]       History reviewed. No pertinent family history.     Social History     Socioeconomic History    Marital status:      Spouse name: Jonathan Sheehan Number of children: Not on file    Years of education: Not on file    Highest education level: Not on file   Occupational History    Not on file   Social Needs    Financial resource strain: Not on file    Food insecurity     Worry: Not on file     Inability: Not on file    Transportation needs     Medical: Not on file     Non-medical: Not on file   Tobacco Use    Smoking status: Current Every Day Smoker     Packs/day: 1.00     Types: Cigarettes    Smokeless tobacco: Never Used    Tobacco comment: 0.5 ppd 12/30/20   Substance and Sexual Activity    Alcohol use: Never     Frequency: Never    Drug use: Never    Sexual activity: Not on file   Lifestyle    Physical activity     Days per week: Not on file     Minutes per session: Not on file    Stress: Not on file   Relationships    Social connections     Talks on phone: Not on file     Gets together: Not on file     Attends Jew service: Not on file     Active member of club or organization: Not on file     Attends meetings of clubs or organizations: Not on file     Relationship status: Not on file    Intimate partner violence     Fear of current or ex partner: Not on file     Emotionally abused: Not on file     Physically abused: Not on file     Forced sexual activity: Not on file   Other Topics Concern    Not on file   Social History Narrative    Not on file       Medications & Allergies:   Current Outpatient Medications   Medication Instructions    baclofen (LIORESAL) 10 mg, Oral, 3 TIMES DAILY    buPROPion (WELLBUTRIN XL) 150 mg, Oral, EVERY MORNING    hydrOXYzine (VISTARIL) 50 mg, Oral, 3 TIMES DAILY PRN  lactobacillus (CULTURELLE) capsule 1 capsule, Oral, DAILY WITH BREAKFAST    lisinopril (PRINIVIL;ZESTRIL) 10 mg, Oral, DAILY    meloxicam (MOBIC) 15 mg, Oral, DAILY    pantoprazole (PROTONIX) 40 mg, Oral, 2 TIMES DAILY BEFORE MEALS    PARoxetine (PAXIL) 20 mg, Oral, DAILY    pregabalin (LYRICA) 75 mg, Oral, EVERY EVENING    tiZANidine (ZANAFLEX) 4 mg, Oral, EVERY 8 HOURS PRN       No Known Allergies    Review of Systems:   Constitutional: negative for weight changes or fevers  Genitourinary: negative for bowel/bladder incontinence   Musculoskeletal: positive for low back pain  Neurological: negative for any leg weakness or numbness/tingling  Behavioral/Psych: admits to anxiety/depression   All other systems reviewed and are negative    Objective:     Vitals:    01/20/21 0854   BP: 136/84       Constitutional: Pleasant, no acute distress   Head: Normocephalic, atraumatic   Eyes: Conjunctivae normal   Neck: Supple, symmetrical   Lungs: Normal respiratory effort, non-labored breathing   Cardiovascular: Limbs warm and well perfused   Abdomen: Non-protruded   Musculoskeletal: Muscle bulk symmetric, no atrophy, no gross deformities   · Lumbar Spine: Lumbar paraspinals tender bilaterally. SLR neg bilaterally. ROZINA neg bilaterally. Positive facet loading bilaterally. Bilateral SI joints non-tender to palpation. Bilateral greater trochanters non-tender to palpation. Neurological: Cranial nerves II-XII grossly intact. · Gait - Slightly antalgic gait. Ambulates without assist device.    · Motor: 5/5 muscle strength in bilateral hip flexion, knee flexion, knee extension, dorsiflexion, and plantar flexion   · Sensory: LT sensation intact in lower limbs   · Reflexes: 2+ symmetrical in bilateral achilles, 2+ bilateral patellar, negative ankle clonus  Skin: No rashes or lesions visible in low back  Psychological: Cooperative, no exaggerated pain behaviors         Assessment:    Diagnosis Orders 1. Lumbar facet joint pain  CHG FLUOR NEEDLE/CATH SPINE/PARASPINAL DX/THER ADDON    DC INJ DX/THER AGNT PARAVERT FACET JOINT, LUMBAR/SAC, 1ST LEVEL    DC INJ DX/THER AGNT PARAVERT FACET JOINT, LUMBAR/SAC, 2ND LEVEL    pregabalin (LYRICA) 75 MG capsule   2. Myofascial pain  pregabalin (LYRICA) 75 MG capsule   3. Chronic pain syndrome     4. Lumbar spondylosis           Geoff Ordonez is a 45 y.o.male presenting to the pain clinic for evaluation of chronic low back pain. His clinical history and physical examination are consistent with lumbar facet mediated pain and associated myofascial pain. I have set him up for diagnostic lumbar medial branch blocks targeting the facet joints of L4/L5 and L5/S1 bilaterally. I have started him on low-dose Lyrica 75 mg at night to help with the myofascial pain component and tizanidine for his muscle relaxant. Plan: The following treatment recommendations and plan were discussed in detail with Geoff Ordonez. Imaging:   I have reviewed patients imaging of MRI L-spine (report) and results were discussed with patient today. Analgesics:   Patient is taking Acetaminophen. Patient informed that the maximum amount of acetaminophen taken on a regular basis should only be 4000 mg per day. Adjuvants: In light of the presence of a neuropathic component of pain, we will start low dose Lyrica 75 mg at night. For continued chronic pain with associated musculoskeletal component, we will start     Interventions: In presence of lumbar axial back pain and with physical exam consistent for facetal pain, the option of medial branch blocks at bilateral L4/L5 and L5/S1 was chosen. The risks and benefits were discussed in detail with the patient. Patient wants to proceed with the injection. Anticoagulation/NPO Recommendations:   Patient does not need to hold any medications prior to the procedure. Patient will need to be NPO x 8 hours prior to the procedure. We will start an IV prior to the procedure. Multidisciplinary Pain Management:   In the presence of complex, chronic, and multi-factorial pain, the importance of a multidisciplinary approach to pain management in the patients management regimen was emphasized and discussed in great detail. PHYSICAL THERAPY:  I would advise the patient    Referrals:  None    Prescriptions Written This Visit:   Tizanidine   Lyrica     Follow-up: For lumbar MBBs    I spent 40 minutes with the patient and greater than 50% this time was spent discussing medication options (Lyrica and tizanidine) and send the patient up for new levels for his diagnostic lumbar medial branch blocks.       Ashwin Montero, DO  Interventional Pain Management/PM&R   [place]

## 2021-02-03 ENCOUNTER — OFFICE VISIT (OUTPATIENT)
Dept: PHYSICAL MEDICINE AND REHAB | Age: 41
End: 2021-02-03
Payer: COMMERCIAL

## 2021-02-03 ENCOUNTER — OFFICE VISIT (OUTPATIENT)
Dept: UROLOGY | Age: 41
End: 2021-02-03
Payer: COMMERCIAL

## 2021-02-03 VITALS
BODY MASS INDEX: 35.5 KG/M2 | DIASTOLIC BLOOD PRESSURE: 88 MMHG | WEIGHT: 248 LBS | TEMPERATURE: 96.9 F | SYSTOLIC BLOOD PRESSURE: 130 MMHG | HEIGHT: 70 IN

## 2021-02-03 VITALS — BODY MASS INDEX: 35.99 KG/M2 | WEIGHT: 251.4 LBS | HEIGHT: 70 IN | TEMPERATURE: 98.1 F

## 2021-02-03 DIAGNOSIS — M54.59 LUMBAR FACET JOINT PAIN: Primary | ICD-10-CM

## 2021-02-03 DIAGNOSIS — R39.12 WEAK URINARY STREAM: Primary | ICD-10-CM

## 2021-02-03 DIAGNOSIS — M79.18 MYOFASCIAL PAIN: ICD-10-CM

## 2021-02-03 DIAGNOSIS — M47.816 LUMBAR SPONDYLOSIS: ICD-10-CM

## 2021-02-03 DIAGNOSIS — G89.4 CHRONIC PAIN SYNDROME: ICD-10-CM

## 2021-02-03 DIAGNOSIS — R30.0 DYSURIA: ICD-10-CM

## 2021-02-03 LAB
BILIRUBIN URINE: NEGATIVE
BLOOD URINE, POC: NORMAL
CHARACTER, URINE: CLEAR
COLOR, URINE: YELLOW
GLUCOSE URINE: NEGATIVE MG/DL
KETONES, URINE: NEGATIVE
LEUKOCYTE CLUMPS, URINE: NEGATIVE
NITRITE, URINE: NEGATIVE
PH, URINE: 7.5 (ref 5–9)
POST VOID RESIDUAL (PVR): 59 ML
PROTEIN, URINE: NEGATIVE MG/DL
SPECIFIC GRAVITY, URINE: 1.02 (ref 1–1.03)
UROBILINOGEN, URINE: 0.2 EU/DL (ref 0–1)

## 2021-02-03 PROCEDURE — 99203 OFFICE O/P NEW LOW 30 MIN: CPT | Performed by: UROLOGY

## 2021-02-03 PROCEDURE — 99215 OFFICE O/P EST HI 40 MIN: CPT | Performed by: ANESTHESIOLOGY

## 2021-02-03 PROCEDURE — 51798 US URINE CAPACITY MEASURE: CPT | Performed by: UROLOGY

## 2021-02-03 PROCEDURE — 81003 URINALYSIS AUTO W/O SCOPE: CPT | Performed by: UROLOGY

## 2021-02-03 RX ORDER — TAMSULOSIN HYDROCHLORIDE 0.4 MG/1
0.4 CAPSULE ORAL DAILY
Qty: 30 CAPSULE | Refills: 5 | Status: ON HOLD | OUTPATIENT
Start: 2021-02-03 | End: 2021-02-23

## 2021-02-03 RX ORDER — HYDROCODONE BITARTRATE AND ACETAMINOPHEN 5; 325 MG/1; MG/1
1 TABLET ORAL 2 TIMES DAILY PRN
Qty: 60 TABLET | Refills: 0 | Status: SHIPPED | OUTPATIENT
Start: 2021-02-03 | End: 2021-03-03 | Stop reason: SDUPTHER

## 2021-02-03 RX ORDER — PHENAZOPYRIDINE HYDROCHLORIDE 200 MG/1
200 TABLET, FILM COATED ORAL 3 TIMES DAILY PRN
Qty: 30 TABLET | Refills: 0 | Status: ON HOLD | OUTPATIENT
Start: 2021-02-03 | End: 2021-02-23

## 2021-02-03 ASSESSMENT — ENCOUNTER SYMPTOMS
EYE ITCHING: 0
SHORTNESS OF BREATH: 0
EYE DISCHARGE: 0
BLOOD IN STOOL: 0
ABDOMINAL PAIN: 0
CHEST TIGHTNESS: 0

## 2021-02-03 NOTE — PATIENT INSTRUCTIONS
You may receive a survey regarding the care you received during your visit. Your input is valuable to us. We encourage you to complete and return your survey. We hope you will choose us in the future for your healthcare needs. Patient Education        Stopping Smoking: Care Instructions  Your Care Instructions     Cigarette smokers crave the nicotine in cigarettes. Giving it up is much harder than simply changing a habit. Your body has to stop craving the nicotine. It is hard to quit, but you can do it. There are many tools that people use to quit smoking. You may find that combining tools works best for you. There are several steps to quitting. First you get ready to quit. Then you get support to help you. After that, you learn new skills and behaviors to become a nonsmoker. For many people, a necessary step is getting and using medicine. Your doctor will help you set up the plan that best meets your needs. You may want to attend a smoking cessation program to help you quit smoking. When you choose a program, look for one that has proven success. Ask your doctor for ideas. You will greatly increase your chances of success if you take medicine as well as get counseling or join a cessation program.  Some of the changes you feel when you first quit tobacco are uncomfortable. Your body will miss the nicotine at first, and you may feel short-tempered and grumpy. You may have trouble sleeping or concentrating. Medicine can help you deal with these symptoms. You may struggle with changing your smoking habits and rituals. The last step is the tricky one: Be prepared for the smoking urge to continue for a time. This is a lot to deal with, but keep at it. You will feel better. Follow-up care is a key part of your treatment and safety. Be sure to make and go to all appointments, and call your doctor if you are having problems. It's also a good idea to know your test results and keep a list of the medicines you take. How can you care for yourself at home? · Ask your family, friends, and coworkers for support. You have a better chance of quitting if you have help and support. · Join a support group, such as Nicotine Anonymous, for people who are trying to quit smoking. · Consider signing up for a smoking cessation program, such as the American Lung Association's Freedom from Smoking program.  · Get text messaging support. Go to the website at www.smokefree. gov to sign up for the [facility] program.  · Set a quit date. Pick your date carefully so that it is not right in the middle of a big deadline or stressful time. Once you quit, do not even take a puff. Get rid of all ashtrays and lighters after your last cigarette. Clean your house and your clothes so that they do not smell of smoke. · Learn how to be a nonsmoker. Think about ways you can avoid those things that make you reach for a cigarette. ? Avoid situations that put you at greatest risk for smoking. For some people, it is hard to have a drink with friends without smoking. For others, they might skip a coffee break with coworkers who smoke. ? Change your daily routine. Take a different route to work or eat a meal in a different place. · Cut down on stress. Calm yourself or release tension by doing an activity you enjoy, such as reading a book, taking a hot bath, or gardening. · Talk to your doctor or pharmacist about nicotine replacement therapy, which replaces the nicotine in your body. You still get nicotine but you do not use tobacco. Nicotine replacement products help you slowly reduce the amount of nicotine you need. These products come in several forms, many of them available over-the-counter:  ? Nicotine patches  ? Nicotine gum and lozenges  ?  Nicotine inhaler · Ask your doctor about bupropion (Wellbutrin) or varenicline (Chantix), which are prescription medicines. They do not contain nicotine. They help you by reducing withdrawal symptoms, such as stress and anxiety. · Some people find hypnosis, acupuncture, and massage helpful for ending the smoking habit. · Eat a healthy diet and get regular exercise. Having healthy habits will help your body move past its craving for nicotine. · Be prepared to keep trying. Most people are not successful the first few times they try to quit. Do not get mad at yourself if you smoke again. Make a list of things you learned and think about when you want to try again, such as next week, next month, or next year. Where can you learn more? Go to https://"[email or web address].ChessCube.com. org and sign in to your [employer] account. Enter D014 in the [employer] box to learn more about \"Stopping Smoking: Care Instructions. \"     If you do not have an account, please click on the \"Sign Up Now\" link. Current as of: March 12, 2020               Content Version: 12.6  © 2982-4417 [employer]. Care instructions adapted under license by [facility] ([facility]). If you have questions about a medical condition or this instruction, always ask your healthcare professional. [address] any warranty or liability for your use of this information.

## 2021-02-03 NOTE — PROGRESS NOTES
[address] [facility]  [address]  [facility] [address]  Dept: [phone]  Loc: [phone]  Visit Date: 2/3/2021    Subjective:      Patient ID: Edmund Torres 45 y.o. male 1980    Chief Complaint   Patient presents with    Advice Only     New patient- weak urinary stream       HPI 71-year-old white male who gives a 2 to 3-month history of weak urinary stream, hesitancy and dysuria. He also has nocturia x4. He has a burning sensation in the urethra regardless of whether he voids urine or not. He denies ever having a Wei catheter. He denies any urethral trauma, he denies any STDs. He denies any urethral discharge at this time  Previous urine analysis from the EMR shows glycosuria but no hematuria, dysuria or nitrite positive. He has a remote history of nephrolithiasis x3. The last stone he had was over 10 years ago. He had a testicular biopsy for infertility in 2010.     Past Medical History:   Diagnosis Date    Chronic back pain     GERD (gastroesophageal reflux disease)     History of high blood pressure     Hypertension        Social History     Socioeconomic History    Marital status:      Spouse name: Hernan Lui Number of children: Not on file    Years of education: Not on file    Highest education level: Not on file   Occupational History    Not on file   Social Needs    Financial resource strain: Not on file    Food insecurity     Worry: Not on file     Inability: Not on file   [employer] needs     Medical: Not on file     Non-medical: Not on file   Tobacco Use    Smoking status: Current Every Day Smoker     Packs/day: 1.00     Types: Cigarettes    Smokeless tobacco: Never Used    Tobacco comment: 0.5 ppd 12/30/20   Substance and Sexual Activity    Alcohol use: Never     Frequency: Never    Drug use: Never    Sexual activity: Not on file   Lifestyle    Physical activity Days per week: Not on file     Minutes per session: Not on file    Stress: Not on file   Relationships    Social connections     Talks on phone: Not on file     Gets together: Not on file     Attends Yazidi service: Not on file     Active member of club or organization: Not on file     Attends meetings of clubs or organizations: Not on file     Relationship status: Not on file    Intimate partner violence     Fear of current or ex partner: Not on file     Emotionally abused: Not on file     Physically abused: Not on file     Forced sexual activity: Not on file   Other Topics Concern    Not on file   Social History Narrative    Not on file       History reviewed. No pertinent family history. Past Surgical History:   Procedure Laterality Date    COLONOSCOPY  12/2020    WNL    PAIN MANAGEMENT PROCEDURE Bilateral 11/18/2020    L3, L4 medial branch and L5 dorsal rami injection, bilateral performed by Haritha Michelle MD at [address] [facility]       No Known Allergies      Current Outpatient Medications:     HYDROcodone-acetaminophen (NORCO) 5-325 MG per tablet, Take 1 tablet by mouth 2 times daily as needed for Pain for up to 30 days. , Disp: 60 tablet, Rfl: 0    tamsulosin (FLOMAX) 0.4 MG capsule, Take 1 capsule by mouth daily, Disp: 30 capsule, Rfl: 5    phenazopyridine (PYRIDIUM) 200 MG tablet, Take 1 tablet by mouth 3 times daily as needed for Pain, Disp: 30 tablet, Rfl: 0    tiZANidine (ZANAFLEX) 4 MG tablet, Take 1 tablet by mouth every 8 hours as needed (For msucle tightness/spasms), Disp: 90 tablet, Rfl: 2    baclofen (LIORESAL) 10 MG tablet, Take 1 tablet by mouth 3 times daily, Disp: 90 tablet, Rfl: 0    PARoxetine (PAXIL) 20 MG tablet, Take 1 tablet by mouth daily, Disp: 90 tablet, Rfl: 3    buPROPion (WELLBUTRIN XL) 150 MG extended release tablet, Take 1 tablet by mouth every morning, Disp: 90 tablet, Rfl: 3   hydrOXYzine (VISTARIL) 50 MG capsule, Take 1 capsule by mouth 3 times daily as needed for Anxiety, Disp: 90 capsule, Rfl: 0    lactobacillus (CULTURELLE) capsule, Take 1 capsule by mouth daily (with breakfast), Disp: 30 capsule, Rfl: 0    pantoprazole (PROTONIX) 40 MG tablet, Take 1 tablet by mouth 2 times daily (before meals), Disp: 60 tablet, Rfl: 0    lisinopril (PRINIVIL;ZESTRIL) 10 MG tablet, Take 1 tablet by mouth daily, Disp: 90 tablet, Rfl: 3    Review of Systems   Constitutional: Negative for fatigue and fever. HENT: Negative for congestion and ear pain. Eyes: Negative for discharge and itching. Respiratory: Negative for chest tightness and shortness of breath. Cardiovascular: Negative for chest pain and leg swelling. Gastrointestinal: Negative for abdominal pain and blood in stool. Endocrine: Negative for cold intolerance and heat intolerance. Genitourinary: Positive for difficulty urinating and dysuria. Musculoskeletal: Positive for joint swelling and neck pain. Skin: Positive for rash and wound. Allergic/Immunologic: Negative for environmental allergies and food allergies. Neurological: Positive for dizziness and headaches. Hematological: Does not bruise/bleed easily. Temp 98.1 °F (36.7 °C)   Ht 5' 10\" (1.778 m)   Wt 251 lb 6.4 oz (114 kg)   BMI 36.07 kg/m²     Objective:   Physical Exam  Vitals signs reviewed. Constitutional:       Appearance: He is well-developed. He is obese. HENT:      Head: Normocephalic and atraumatic. Right Ear: External ear normal.      Left Ear: External ear normal.   Eyes:      Extraocular Movements: Extraocular movements intact. Conjunctiva/sclera: Conjunctivae normal.      Pupils: Pupils are equal, round, and reactive to light. Abdominal:      General: Bowel sounds are normal. There is no distension. Palpations: Abdomen is soft. There is no mass. Tenderness: There is abdominal tenderness. There is no right CVA tenderness, left CVA tenderness, guarding or rebound. Hernia: No hernia is present. Comments: Mild diffuse abdominal tenderness. Genitourinary:     Penis: Normal.       Testes: Normal.      Comments: Normal foreskin. Testicles are slightly tender to palpation with no mass. Spermatic cords are palpably normal.  Skin:     General: Skin is warm and dry. Neurological:      Mental Status: He is alert and oriented to person, place, and time. Psychiatric:         Mood and Affect: Mood normal.         Behavior: Behavior normal.         Assessment:       Diagnosis Orders   1. Weak urinary stream  POCT Urinalysis No Micro (Auto)    poct post void residual   2. Dysuria         Mr. Nhan Nicole today in consultation for Weak urinary stream [R39.12]. Patient states he drinks plenty of water and sweet tea. He states he does not consume any carbonated beverages. UA today is WNL. PVR is 59 mL. He has had glycosuria on a couple of occasions in the past.  There is no documented history of diabetes. Plan:        I recommended he try Pyridium 200 mg 3 times daily as needed for symptomatic relief. I am also prescribing tamsulosin 0.4 mg p.o. nightly. Common side effects discussed. I told him to discuss glycosuria with his PCP. Return in 3 weeks. He is no better in 3 weeks I recommended he undergo office cystoscopy.

## 2021-02-03 NOTE — PATIENT INSTRUCTIONS
The following treatment recommendations and plan were discussed in detail with Cory Josue. Imaging:   I have reviewed patients imaging of MRI L-spine (report) and results were discussed with patient today. Analgesics:   Patient is taking Acetaminophen. Patient informed that the maximum amount of acetaminophen taken on a regular basis should only be 4000 mg per day. Adjuvants: In light of the presence of a neuropathic component of pain, we will start low dose Lyrica 75 mg at night. For continued chronic pain with associated musculoskeletal component, we will start     Interventions: In presence of lumbar axial back pain and with physical exam consistent for facetal pain, the option of medial branch blocks at bilateral L4/L5 and L5/S1 was chosen. The risks and benefits were discussed in detail with the patient. Patient wants to proceed with the injection. Anticoagulation/NPO Recommendations:   Patient does not need to hold any medications prior to the procedure. Patient will need to be NPO x 8 hours prior to the procedure. We will start an IV prior to the procedure. Multidisciplinary Pain Management:   In the presence of complex, chronic, and multi-factorial pain, the importance of a multidisciplinary approach to pain management in the patients management regimen was emphasized and discussed in great detail.    PHYSICAL THERAPY:  I would advise the patient     Referrals:  None    Prescriptions Written This Visit:   Norco 5 mg (#60, 0 refills)    Follow-up: 2/24/21

## 2021-02-06 DIAGNOSIS — I10 ESSENTIAL HYPERTENSION: ICD-10-CM

## 2021-02-08 RX ORDER — PAROXETINE HYDROCHLORIDE 20 MG/1
20 TABLET, FILM COATED ORAL DAILY
Qty: 90 TABLET | Refills: 3 | OUTPATIENT
Start: 2021-02-08

## 2021-02-08 RX ORDER — HYDROXYZINE PAMOATE 50 MG/1
50 CAPSULE ORAL 3 TIMES DAILY PRN
Qty: 90 CAPSULE | Refills: 0 | OUTPATIENT
Start: 2021-02-08

## 2021-02-08 RX ORDER — BUPROPION HYDROCHLORIDE 150 MG/1
150 TABLET ORAL EVERY MORNING
Qty: 90 TABLET | Refills: 3 | OUTPATIENT
Start: 2021-02-08

## 2021-02-08 RX ORDER — LISINOPRIL 10 MG/1
10 TABLET ORAL DAILY
Qty: 90 TABLET | Refills: 3 | OUTPATIENT
Start: 2021-02-08

## 2021-02-08 NOTE — TELEPHONE ENCOUNTER
Request sent via [employer] for refill of hydroxyzine 50 mg tid prn, wellbutrin xl 150 qd, paxil 20 mg qd, and lisinopril 10 mg qd. Last seen 1/6/21, no future appt scheduled. All prescriptions were sent electronically on 1/12/21 for 1 year supply with the exception of Vistaril. Confirmed with pharmacy.

## 2021-02-15 ENCOUNTER — TELEPHONE (OUTPATIENT)
Dept: PHYSICAL MEDICINE AND REHAB | Age: 41
End: 2021-02-15

## 2021-02-15 NOTE — H&P
Today, patient presents for planned diagnostic lumbar MBBs. This note is reflective of the patient's previous visit for evaluation. We will proceed with today's planned procedure. Since patient's last visit for evaluation, there have been no interval changes in medical history. Patient has no new numbness, weakness, or focal neurological deficit since evaluation. Patient has no contraindications to injection (no anticoagulation, recent antibiotic intake for active infections, allergies to latex / contrast dye / steroid medications), and has a  present. NPO necessity has been assessed and accepted based on procedure complexity. The risks and benefits of the procedure have been explained including but are not limited to infection, bleeding, paralysis, immediate post procedure weakness, and dizziness; the patient acknowledges understanding and desires to proceed with the procedure. Consented for same procedure. All other questions and concerns were addressed at bedside. See procedure note for full details. Vitals:    02/23/21 1211   BP: (!) 140/83   Pulse: 84   Resp: 16   Temp: 97.7 °F (36.5 °C)   SpO2: 95%          Diagnosis Orders   1. Lumbar facet joint pain  HYDROcodone-acetaminophen (NORCO) 5-325 MG per tablet   2. Myofascial pain     3. Chronic pain syndrome     4. Lumbar spondylosis         Follow up: 3/3/21     Post procedure Instructions: The patient was advised not to drive during the day of the procedure and not to engage in any significant decision making. The patient was also advised to be cautious with walking/activity for 24 hours following today's visit and asked not to engage in over-exertion. After this time, it is ok to resume pre-procedure level of activity. Patient advised to apply ice to site of injection in situations of pain and discomfort. Patient advised to not submerge site of injection during bath or pool activities for approximately 48 hours post-procedure.  Patient attested to understanding post procedure directions / restrictions. All other questions and concerns addressed before patient discharge in ambulatory fashion. Chronic Pain Clinic Note     Encounter Date: 2/3/21    Subjective:   Chief Complaint:   No chief complaint on file. History of Present Illness:   Nuris Taylor is a 45 y.o. male seen in the Pain Clinic on 02/15/21 for his history of chronic low back pain. He has a medical history of anxiety/depression and diverticulitis. He has been dealing with low back pain for the last 4 years after he fell off a roof onto a ladder. He describes his pain is primarily in the axial back with some radiation down into bilateral buttocks. He rates his pain a constant 7/10 aching, stabbing pain. His pain is worse when he is upright or doing any activities particularly working. He works as a  . His pain is improved with rest.  He denies any focal leg weakness, new paresthesias, saddle anesthesia, bowel/bladder incontinence. He states his pain is interfering with his everyday activities including work and sleep. Of note, he was previously seen Dr. Amberly Reis and underwent diagnostic lumbar medial branch blocks on 11/18/2020 in which he reports benefit for a couple hours. He was previously taking tramadol and Flexeril with no benefit. He underwent physical therapy within the last year with no benefit as well. Today, 2/3/2021, patient presents for plan follow-up of chronic low back pain. He reports having side effects on the Lyrica including feeling more anxious and worsening restless legs at night. He states that this is similar to when he tried gabapentin in the past.  He continues to struggle with his low back pain and states it is interfering with his ability to get through work shift. He denies any new symptoms of focal leg weakness, new paresthesias, saddle anesthesia, bowel/bladder incontinence.     History of Interventions:   Surgery: No previous back surgeries  Injections: Lumbar MBBs (11/18/20) - noted benefit    Current Treatment Medications:   Paxil - anxiety/depression   Wellbutrin - anxiety/depression  Baclofen 10 mg 3 times daily as needed    Historical Treatment Medications:   NSAIDs - unable to take due to diverticulitis  Flexeril - no benefit  Tramadol - no benefit  Gabapentin - unable to tolerate (worsening anxiety and RLS)  Lyrica - unable to tolerate (worsening anxiety and RLS)    Imaging:  MRI L-spine (7/14/20)        Past Medical History:   Diagnosis Date    Chronic back pain     GERD (gastroesophageal reflux disease)     History of high blood pressure     Hypertension        Past Surgical History:   Procedure Laterality Date    COLONOSCOPY  12/2020    WNL    PAIN MANAGEMENT PROCEDURE Bilateral 11/18/2020    L3, L4 medial branch and L5 dorsal rami injection, bilateral performed by Elayne Larson MD at [address] [facility]       No family history on file.     Social History     Socioeconomic History    Marital status:      Spouse name: Zayra Navarro Number of children: Not on file    Years of education: Not on file    Highest education level: Not on file   Occupational History    Not on file   Social Needs    Financial resource strain: Not on file    Food insecurity     Worry: Not on file     Inability: Not on file    Transportation needs     Medical: Not on file     Non-medical: Not on file   Tobacco Use    Smoking status: Current Every Day Smoker     Packs/day: 1.00     Types: Cigarettes    Smokeless tobacco: Never Used    Tobacco comment: 0.5 ppd 12/30/20   Substance and Sexual Activity    Alcohol use: Never     Frequency: Never    Drug use: Never    Sexual activity: Not on file   Lifestyle    Physical activity     Days per week: Not on file     Minutes per session: Not on file    Stress: Not on file   Relationships    Social connections     Talks on phone: Not on file     Gets together: Not on file Attends Jewish service: Not on file     Active member of club or organization: Not on file     Attends meetings of clubs or organizations: Not on file     Relationship status: Not on file    Intimate partner violence     Fear of current or ex partner: Not on file     Emotionally abused: Not on file     Physically abused: Not on file     Forced sexual activity: Not on file   Other Topics Concern    Not on file   Social History Narrative    Not on file       Medications & Allergies:   Current Outpatient Medications   Medication Instructions    buPROPion (WELLBUTRIN XL) 150 mg, Oral, EVERY MORNING    HYDROcodone-acetaminophen (NORCO) 5-325 MG per tablet 1 tablet, Oral, 2 TIMES DAILY PRN    hydrOXYzine (VISTARIL) 50 mg, Oral, 3 TIMES DAILY PRN    lisinopril (PRINIVIL;ZESTRIL) 10 mg, Oral, DAILY    pantoprazole (PROTONIX) 40 mg, Oral, 2 TIMES DAILY BEFORE MEALS    PARoxetine (PAXIL) 20 mg, Oral, DAILY    phenazopyridine (PYRIDIUM) 200 mg, Oral, 3 TIMES DAILY PRN    tamsulosin (FLOMAX) 0.4 mg, Oral, DAILY    tiZANidine (ZANAFLEX) 4 mg, Oral, EVERY 8 HOURS PRN       No Known Allergies    Review of Systems:   Constitutional: negative for weight changes or fevers  Genitourinary: negative for bowel/bladder incontinence   Musculoskeletal: positive for low back pain  Neurological: negative for any leg weakness or numbness/tingling  Behavioral/Psych: admits to anxiety/depression   All other systems reviewed and are negative    Objective: There were no vitals filed for this visit. Constitutional: Pleasant, no acute distress   Head: Normocephalic, atraumatic   Eyes: Conjunctivae normal   Neck: Supple, symmetrical   Lungs: Normal respiratory effort, non-labored breathing   Cardiovascular: Limbs warm and well perfused   Abdomen: Non-protruded   Musculoskeletal: Muscle bulk symmetric, no atrophy, no gross deformities   · Lumbar Spine: Lumbar paraspinals tender bilaterally. SLR neg bilaterally.  ROZINA neg bilaterally. Positive facet loading bilaterally. Neurological: Cranial nerves II-XII grossly intact. · Gait - Slightly antalgic gait. Ambulates without assist device. · Motor: No focal motor deficit  Skin: No rashes or lesions visible in low back  Psychological: Cooperative, no exaggerated pain behaviors         Assessment:    Diagnosis Orders   1. Lumbar facet joint pain  HYDROcodone-acetaminophen (NORCO) 5-325 MG per tablet   2. Myofascial pain     3. Chronic pain syndrome     4. Lumbar spondylosis           Alicia Bah is a 45 y.o.male presenting to the pain clinic for evaluation of chronic low back pain. His clinical history and physical examination are consistent with lumbar facet mediated pain and associated myofascial pain. I have set him up for diagnostic lumbar medial branch blocks targeting the facet joints of L4/L5 and L5/S1 bilaterally. With side effects on Lyrica and previous medications, I have started him on low-dose Norco 5 mg twice daily as needed. This medication will be used to bridge the gap until his lumbar RFA. Pain contract signed today. We will follow-up on 2/24 for medication refill and after his diagnostic lumbar medial branch blocks. Plan: The following treatment recommendations and plan were discussed in detail with Alicia Bah. Imaging:   I have reviewed patients imaging of MRI L-spine (report) and results were discussed with patient today. Analgesics:   I have started him on Norco 5 mg twice daily as needed. This medication is only to bridge him to his lumbar radiofrequency ablation procedure. In addition, this medication is to help keep him more functional and continue to work. I explained the risks of using this medication long-term including risk of developing tolerance, physical dependence, and abuse potential of medication. I advised the patient on to take this medication when pain is severe (pain greater than 7/10).   In addition, I advised the patient to keep this medication stored in a safe place. If the medication is stolen, the patient will not get refills on the medication. [place] reviewed today and is appropriate (2/3/21). Pain contract agreement signed today (2/3/21). Patient is taking Acetaminophen. Patient informed that the maximum amount of acetaminophen taken on a regular basis should only be 4000 mg per day. Adjuvants:   Patient can continue baclofen 10 mg 3 times a day as needed. Interventions: In presence of lumbar axial back pain and with physical exam consistent for facetal pain, the option of medial branch blocks at bilateral L4/L5 and L5/S1 was chosen. The risks and benefits were discussed in detail with the patient. Patient wants to proceed with the injection. Anticoagulation/NPO Recommendations:   Patient does not need to hold any medications prior to the procedure. Patient will need to be NPO x 8 hours prior to the procedure. We will start an IV prior to the procedure. Multidisciplinary Pain Management:   In the presence of complex, chronic, and multi-factorial pain, the importance of a multidisciplinary approach to pain management in the patients management regimen was emphasized and discussed in great detail. PHYSICAL THERAPY:  I would advise the patient to continue gentle range of motion stretching exercises twice a day    Referrals:  None    Prescriptions Written This Visit:   Norco 5 mg (#60, 0 refills)    Follow-up: 2/24/21    I spent 40 minutes with the patient and greater than 50% this time was spent discussing his lumbar medial branch block procedure and process to get his radiofrequency ablation procedure approved. In addition, we had an extensive discussion about long-term opioid usage and side effects on this medication.       Mindy Quiñones, DO  Interventional Pain Management/PM&R   [place]

## 2021-02-20 DIAGNOSIS — I10 ESSENTIAL HYPERTENSION: ICD-10-CM

## 2021-02-20 RX ORDER — PAROXETINE HYDROCHLORIDE 20 MG/1
20 TABLET, FILM COATED ORAL DAILY
Qty: 90 TABLET | Refills: 3 | Status: CANCELLED | OUTPATIENT
Start: 2021-02-20

## 2021-02-20 RX ORDER — LISINOPRIL 10 MG/1
10 TABLET ORAL DAILY
Qty: 90 TABLET | Refills: 3 | Status: CANCELLED | OUTPATIENT
Start: 2021-02-20

## 2021-02-20 RX ORDER — BUPROPION HYDROCHLORIDE 150 MG/1
150 TABLET ORAL EVERY MORNING
Qty: 90 TABLET | Refills: 3 | Status: CANCELLED | OUTPATIENT
Start: 2021-02-20

## 2021-02-22 ENCOUNTER — TELEPHONE (OUTPATIENT)
Dept: PHYSICAL MEDICINE AND REHAB | Age: 41
End: 2021-02-22

## 2021-02-22 RX ORDER — HYDROXYZINE PAMOATE 50 MG/1
50 CAPSULE ORAL 3 TIMES DAILY PRN
Qty: 90 CAPSULE | Refills: 0 | Status: SHIPPED | OUTPATIENT
Start: 2021-02-22 | End: 2021-03-25

## 2021-02-22 NOTE — TELEPHONE ENCOUNTER
This medication refill is regarding a MyChart request.  Refill requested by patient. Requested Prescriptions     Pending Prescriptions Disp Refills    lisinopril (PRINIVIL;ZESTRIL) 10 MG tablet 90 tablet 3     Sig: Take 1 tablet by mouth daily    PARoxetine (PAXIL) 20 MG tablet 90 tablet 3     Sig: Take 1 tablet by mouth daily    buPROPion (WELLBUTRIN XL) 150 MG extended release tablet 90 tablet 3     Sig: Take 1 tablet by mouth every morning    hydrOXYzine (VISTARIL) 50 MG capsule 90 capsule 0     Sig: Take 1 capsule by mouth 3 times daily as needed for Anxiety       Date of last visit: 1/6/2021  Date of next visit: None  Date of last refill: Lisinopril  10/28/2020  90/3 (Patient should still have refills at the pharmacy-Refused at this time)  Paroxetine 1/12/2021  90/3  (Script sent for 1 year supply on 1/12/2021-Refused at this time)  Bupropion 1/12/2021  90/3  (Script sent for 1 year supply on 1/12/2021-Refused at this time)  Hydroxyzine 1/12/2021  90/0       Rx verified for Hydroxyzine , ordered and set to EP.

## 2021-02-23 ENCOUNTER — HOSPITAL ENCOUNTER (OUTPATIENT)
Age: 41
Setting detail: OUTPATIENT SURGERY
Discharge: HOME | End: 2021-02-23
Attending: ANESTHESIOLOGY | Admitting: ANESTHESIOLOGY
Payer: COMMERCIAL

## 2021-02-23 ENCOUNTER — APPOINTMENT (OUTPATIENT)
Dept: GENERAL RADIOLOGY | Age: 41
End: 2021-02-23
Attending: ANESTHESIOLOGY
Payer: COMMERCIAL

## 2021-02-23 VITALS
DIASTOLIC BLOOD PRESSURE: 60 MMHG | SYSTOLIC BLOOD PRESSURE: 117 MMHG | BODY MASS INDEX: 35.9 KG/M2 | HEIGHT: 71 IN | TEMPERATURE: 97.7 F | HEART RATE: 76 BPM | RESPIRATION RATE: 16 BRPM | OXYGEN SATURATION: 97 % | WEIGHT: 256.4 LBS

## 2021-02-23 PROCEDURE — 7100000010 HC PHASE II RECOVERY - FIRST 15 MIN: Performed by: ANESTHESIOLOGY

## 2021-02-23 PROCEDURE — 2709999900 HC NON-CHARGEABLE SUPPLY: Performed by: ANESTHESIOLOGY

## 2021-02-23 PROCEDURE — 3209999900 FLUORO FOR SURGICAL PROCEDURES

## 2021-02-23 PROCEDURE — 2500000003 HC RX 250 WO HCPCS: Performed by: ANESTHESIOLOGY

## 2021-02-23 PROCEDURE — 7100000011 HC PHASE II RECOVERY - ADDTL 15 MIN: Performed by: ANESTHESIOLOGY

## 2021-02-23 PROCEDURE — 3600000053 HC PAIN LEVEL 2 ADDL 15 MIN: Performed by: ANESTHESIOLOGY

## 2021-02-23 PROCEDURE — 99152 MOD SED SAME PHYS/QHP 5/>YRS: CPT | Performed by: ANESTHESIOLOGY

## 2021-02-23 PROCEDURE — 3600000052 HC PAIN LEVEL 2 BASE: Performed by: ANESTHESIOLOGY

## 2021-02-23 PROCEDURE — 64493 INJ PARAVERT F JNT L/S 1 LEV: CPT | Performed by: ANESTHESIOLOGY

## 2021-02-23 PROCEDURE — 64494 INJ PARAVERT F JNT L/S 2 LEV: CPT | Performed by: ANESTHESIOLOGY

## 2021-02-23 PROCEDURE — 6360000002 HC RX W HCPCS: Performed by: ANESTHESIOLOGY

## 2021-02-23 RX ORDER — FENTANYL CITRATE 50 UG/ML
INJECTION, SOLUTION INTRAMUSCULAR; INTRAVENOUS PRN
Status: DISCONTINUED | OUTPATIENT
Start: 2021-02-23 | End: 2021-02-23 | Stop reason: ALTCHOICE

## 2021-02-23 RX ORDER — MIDAZOLAM HYDROCHLORIDE 1 MG/ML
INJECTION INTRAMUSCULAR; INTRAVENOUS PRN
Status: DISCONTINUED | OUTPATIENT
Start: 2021-02-23 | End: 2021-02-23 | Stop reason: ALTCHOICE

## 2021-02-23 RX ORDER — BUPIVACAINE HYDROCHLORIDE 5 MG/ML
INJECTION, SOLUTION PERINEURAL PRN
Status: DISCONTINUED | OUTPATIENT
Start: 2021-02-23 | End: 2021-02-23 | Stop reason: ALTCHOICE

## 2021-02-23 RX ORDER — LIDOCAINE HYDROCHLORIDE 10 MG/ML
INJECTION, SOLUTION EPIDURAL; INFILTRATION; INTRACAUDAL; PERINEURAL PRN
Status: DISCONTINUED | OUTPATIENT
Start: 2021-02-23 | End: 2021-02-23 | Stop reason: ALTCHOICE

## 2021-02-23 ASSESSMENT — PAIN DESCRIPTION - PAIN TYPE
TYPE: CHRONIC PAIN
TYPE: CHRONIC PAIN

## 2021-02-23 ASSESSMENT — PAIN DESCRIPTION - DESCRIPTORS: DESCRIPTORS: ACHING

## 2021-02-23 ASSESSMENT — PAIN DESCRIPTION - LOCATION
LOCATION: BACK
LOCATION: BACK

## 2021-02-23 ASSESSMENT — PAIN DESCRIPTION - ORIENTATION
ORIENTATION: LOWER
ORIENTATION: LOWER

## 2021-02-23 ASSESSMENT — PAIN DESCRIPTION - FREQUENCY: FREQUENCY: CONTINUOUS

## 2021-02-23 ASSESSMENT — PAIN SCALES - GENERAL
PAINLEVEL_OUTOF10: 8
PAINLEVEL_OUTOF10: 5

## 2021-02-23 NOTE — PROCEDURES
Pre-operative Diagnosis: Bilateral lumbar facet pain     Post-operative Diagnosis: Bilateral lumbar facet pain     Procedure: Bilateral lumbar medial branch blocks targeting facet joints of L4/L5 and L5/S1     Procedure Description:  After consent was obtained, the patient was placed in the prone position having pressure points appropriately padded. The lower back was prepped with chloraprep and draped in a sterile fashion. 0.5 cc of 1 % lidocaine was used for local anesthesia of the skin and superficial subcutaneous tissues. Three 22-gauge 5-inch needles were advanced under fluoroscopy in an AP view with caudad angulation: one at the sacral ala and two at the junction of the right superior articular process and the transverse process of the L4 and L5 vertebrae. There was no paresthesias, heme, or CSF obtained. Needle placement was confirmed using AP and oblique views. Then 0.5 cc of 0.5% bupivacaine was injected at the site without complications. The procedure was repeated at L4, L5, and sacral ala on the left side. The patient tolerated the procedure well, transported to the recovery room, and observed for 15 minutes prior to being discharged in ambulatory fashion.      Procedural Complications: None      IV sedation was used prior to the start of the procedure:  Medications Administered: 1 mg Versed, 50 mcg Fentanyl  Start Time: 13:07  End Time : 13:25  Total Time: 18 minutes      Annalisa White DO  Interventional Pain Management/PM&R   [place] ,

## 2021-02-23 NOTE — PROGRESS NOTES
1320-Patient to Phase II via cart. Report received from [employer]. Patient drowsy but responsive. Vitals obtained and stable. Respirations even and unlabored on room air. Patient denies nausea, numbness and tingling. Patient rates pain 10/10. Patient able to move all extremities. No drainage noted at injection sites. Patient instructed to stay in bed. Instructed on call light use.  1325-Patient provided with ice pack. Patient assisted with sitting up. Patient states pain improved with position change.

## 2021-03-02 DIAGNOSIS — I10 ESSENTIAL HYPERTENSION: ICD-10-CM

## 2021-03-02 RX ORDER — PAROXETINE HYDROCHLORIDE 20 MG/1
20 TABLET, FILM COATED ORAL DAILY
Qty: 90 TABLET | Refills: 3 | OUTPATIENT
Start: 2021-03-02

## 2021-03-02 RX ORDER — LISINOPRIL 10 MG/1
10 TABLET ORAL DAILY
Qty: 90 TABLET | Refills: 3 | OUTPATIENT
Start: 2021-03-02

## 2021-03-02 RX ORDER — BUPROPION HYDROCHLORIDE 150 MG/1
150 TABLET ORAL EVERY MORNING
Qty: 90 TABLET | Refills: 3 | OUTPATIENT
Start: 2021-03-02

## 2021-03-02 NOTE — TELEPHONE ENCOUNTER
Requests sent via [employer] for refills on bupropion 150 qd, paroxetine 20 mg qd, and lisinopril 10 mg qd. Last seen 1/6/21, no future appt scheduled. All refills previously handled for 1 year supply. Instructed pt to contact pharmacy directly.

## 2021-03-03 ENCOUNTER — OFFICE VISIT (OUTPATIENT)
Dept: PHYSICAL MEDICINE AND REHAB | Age: 41
End: 2021-03-03
Payer: COMMERCIAL

## 2021-03-03 VITALS
DIASTOLIC BLOOD PRESSURE: 84 MMHG | TEMPERATURE: 97.2 F | BODY MASS INDEX: 35.84 KG/M2 | WEIGHT: 256 LBS | SYSTOLIC BLOOD PRESSURE: 144 MMHG | HEIGHT: 71 IN

## 2021-03-03 DIAGNOSIS — M47.816 LUMBAR SPONDYLOSIS: ICD-10-CM

## 2021-03-03 DIAGNOSIS — G89.4 CHRONIC PAIN SYNDROME: ICD-10-CM

## 2021-03-03 DIAGNOSIS — M79.18 MYOFASCIAL PAIN: ICD-10-CM

## 2021-03-03 DIAGNOSIS — M54.59 LUMBAR FACET JOINT PAIN: Primary | ICD-10-CM

## 2021-03-03 PROCEDURE — 99214 OFFICE O/P EST MOD 30 MIN: CPT | Performed by: ANESTHESIOLOGY

## 2021-03-03 RX ORDER — HYDROCODONE BITARTRATE AND ACETAMINOPHEN 5; 325 MG/1; MG/1
1 TABLET ORAL 2 TIMES DAILY PRN
Qty: 60 TABLET | Refills: 0 | Status: SHIPPED | OUTPATIENT
Start: 2021-03-03 | End: 2021-03-29 | Stop reason: SDUPTHER

## 2021-03-03 NOTE — PATIENT INSTRUCTIONS
The following treatment recommendations and plan were discussed in detail with Rowan Carbajal. Imaging:   I have reviewed patients imaging of MRI L-spine (report) and results were discussed with patient today. Analgesics:   I have started him on Norco 5 mg twice daily as needed. This medication is only to bridge him to his lumbar radiofrequency ablation procedure. In addition, this medication is to help keep him more functional and continue to work. I explained the risks of using this medication long-term including risk of developing tolerance, physical dependence, and abuse potential of medication. I advised the patient on to take this medication when pain is severe (pain greater than 7/10). In addition, I advised the patient to keep this medication stored in a safe place. If the medication is stolen, the patient will not get refills on the medication. OARRS reviewed today and is appropriate. Pain contract agreement signed today. Patient is taking Acetaminophen. Patient informed that the maximum amount of acetaminophen taken on a regular basis should only be 4000 mg per day. Adjuvants:   Patient can continue baclofen 10 mg 3 times a day as needed. Interventions: In presence of lumbar axial back pain and with physical exam consistent for facetal pain and significant response to diagnostic lumbar medial branch blocks, the option of confirmatory medial branch blocks at bilateral L4/L5 and L5/S1 was chosen. The risks and benefits were discussed in detail with the patient. Patient wants to proceed with the injection. Anticoagulation/NPO Recommendations:   Patient does not need to hold any medications prior to the procedure. Patient will need to be NPO x 8 hours prior to the procedure. We will start an IV prior to the procedure.     Multidisciplinary Pain Management: In the presence of complex, chronic, and multi-factorial pain, the importance of a multidisciplinary approach to pain management in the patients management regimen was emphasized and discussed in great detail.    PHYSICAL THERAPY:  I would advise the patient to continue gentle range of motion stretching exercises twice a day    Referrals:  None    Prescriptions Written This Visit:   Norco 5 mg (#60, 0 refills)    Follow-up: For confirmatory lumbar MBBs

## 2021-03-03 NOTE — PROGRESS NOTES
Chronic Pain Clinic Note     Encounter Date: 3/3/21    Subjective:   Chief Complaint:   Chief Complaint   Patient presents with    Follow Up After Procedure       History of Present Illness:   Edmund Torres is a 45 y.o. male seen in the clinic on 01/20/21 for his history of chronic low back pain. He has a medical history of anxiety/depression and diverticulitis. He has been dealing with low back pain for the last 4 years after he fell off a roof onto a ladder. He describes his pain is primarily in the axial back with some radiation down into bilateral buttocks. He rates his pain a constant 7/10 aching, stabbing pain. His pain is worse when he is upright or doing any activities particularly working. He works as a  . His pain is improved with rest.  He denies any focal leg weakness, new paresthesias, saddle anesthesia, bowel/bladder incontinence. He states his pain is interfering with his everyday activities including work and sleep. Of note, he was previously seen Dr. Zach Leiva and underwent diagnostic lumbar medial branch blocks on 11/18/2020 in which he reports benefit for a couple hours. He was previously taking tramadol and Flexeril with no benefit. He underwent physical therapy within the last year with no benefit as well. Today, 3/3/2021, patient presents for plan follow-up after diagnostic lumbar medial branch blocks performed on 2/23/2021. He reports significant relief (>80%) during the day of the procedure. He reports being increasingly sore the day after the procedure. He states that the Margy Pintos is working out well for him in regards to taking away his severe pain. He denies any side effects on this medication. He continues to work and feels like this medication is keeping him more functional throughout the workday. Denies any new symptoms including new focal leg weakness, new paresthesias, saddle anesthesia, bowel/bladder incontinence.       History of Interventions: Surgery: No previous back surgeries  Injections: Lumbar MBBs (11/18/20) - noted benefit  Diagnostic lumbar MBBs (2/23/21) - >80% relief    Current Treatment Medications:   Norco 5 mg twice daily as neededprescribed by me  Paxil - anxiety/depression   Wellbutrin - anxiety/depression  Baclofen 10 mg 3 times daily as needed    Historical Treatment Medications:   NSAIDs - unable to take due to diverticulitis  Flexeril - no benefit  Tramadol - no benefit  Gabapentin - unable to tolerate (worsening anxiety and RLS)  Lyrica - unable to tolerate (worsening anxiety and RLS)    Imaging:  MRI L-spine (7/14/20)        Past Medical History:   Diagnosis Date    Chronic back pain     Depression     GERD (gastroesophageal reflux disease)     History of high blood pressure     Hypertension        Past Surgical History:   Procedure Laterality Date    COLONOSCOPY  12/2020    WNL    PAIN MANAGEMENT PROCEDURE Bilateral 11/18/2020    L3, L4 medial branch and L5 dorsal rami injection, bilateral performed by Shiraz Buckley MD at [address] Bilateral 2/23/2021    medial branch blocks at bilateral L4/L5 and L5/S1 performed by Kenya Quinn DO at [facility]       History reviewed. No pertinent family history.     Social History     Socioeconomic History    Marital status:      Spouse name: Deonte Carrasco Number of children: Not on file    Years of education: Not on file    Highest education level: Not on file   Occupational History    Not on file   Social Needs    Financial resource strain: Not on file    Food insecurity     Worry: Not on file     Inability: Not on file    Transportation needs     Medical: Not on file     Non-medical: Not on file   Tobacco Use    Smoking status: Current Every Day Smoker     Packs/day: 1.00     Types: Cigarettes    Smokeless tobacco: Never Used    Tobacco comment: 0.5 ppd 12/30/20   Substance and Sexual Activity    Alcohol use: Never     Frequency: Never    Drug use: Never    Sexual activity: Not on file   Lifestyle    Physical activity     Days per week: Not on file     Minutes per session: Not on file    Stress: Not on file   Relationships    Social connections     Talks on phone: Not on file     Gets together: Not on file     Attends Bahai service: Not on file     Active member of club or organization: Not on file     Attends meetings of clubs or organizations: Not on file     Relationship status: Not on file    Intimate partner violence     Fear of current or ex partner: Not on file     Emotionally abused: Not on file     Physically abused: Not on file     Forced sexual activity: Not on file   Other Topics Concern    Not on file   Social History Narrative    Not on file       Medications & Allergies:   Current Outpatient Medications   Medication Instructions    buPROPion (WELLBUTRIN XL) 150 mg, Oral, EVERY MORNING    HYDROcodone-acetaminophen (NORCO) 5-325 MG per tablet 1 tablet, Oral, 2 TIMES DAILY PRN    hydrOXYzine (VISTARIL) 50 mg, Oral, 3 TIMES DAILY PRN    lisinopril (PRINIVIL;ZESTRIL) 10 mg, Oral, DAILY    pantoprazole (PROTONIX) 40 mg, Oral, 2 TIMES DAILY BEFORE MEALS    PARoxetine (PAXIL) 20 mg, Oral, DAILY    tiZANidine (ZANAFLEX) 4 mg, Oral, EVERY 8 HOURS PRN       No Known Allergies    Review of Systems:   Constitutional: negative for weight changes or fevers  Genitourinary: negative for bowel/bladder incontinence   Musculoskeletal: positive for low back pain  Neurological: negative for any leg weakness or numbness/tingling  Behavioral/Psych: admits to anxiety/depression   All other systems reviewed and are negative    Objective:     Vitals:    03/03/21 0819   BP: (!) 144/84   Temp: 97.2 °F (36.2 °C)       Constitutional: Pleasant, no acute distress   Head: Normocephalic, atraumatic   Eyes: Conjunctivae normal   Neck: Supple, symmetrical   Lungs: Normal respiratory effort, non-labored breathing Cardiovascular: Limbs warm and well perfused   Abdomen: Non-protruded   Musculoskeletal: Muscle bulk symmetric, no atrophy, no gross deformities   · Lumbar Spine: Lumbar paraspinals tender bilaterally. SLR neg bilaterally. Positive facet loading bilaterally. Neurological: Cranial nerves II-XII grossly intact. · Gait - Slightly antalgic gait. Ambulates without assist device. · Motor: No focal motor deficit  Skin: No rashes or lesions visible in low back  Psychological: Cooperative, no exaggerated pain behaviors         Assessment:    Diagnosis Orders   1. Lumbar facet joint pain  CHG FLUOR NEEDLE/CATH SPINE/PARASPINAL DX/THER ADDON    DC INJ DX/THER AGNT PARAVERT FACET JOINT, LUMBAR/SAC, 1ST LEVEL    DC INJ DX/THER AGNT PARAVERT FACET JOINT, LUMBAR/SAC, 2ND LEVEL    HYDROcodone-acetaminophen (NORCO) 5-325 MG per tablet   2. Myofascial pain     3. Chronic pain syndrome     4. Lumbar spondylosis           Michael Philip is a 45 y.o.male presenting to the pain clinic for evaluation of chronic low back pain. His clinical history and physical examination are consistent with lumbar facet mediated pain and associated myofascial pain. I have set him up for diagnostic lumbar medial branch blocks targeting the facet joints of L4/L5 and L5/S1 bilaterally. He responded significantly to his diagnostic lumbar medial branch blocks and I set him up for confirmatory blocks at these levels. In addition, I have continued his Norco 5 mg twice a day as needed for severe pain as this has helped him stay more functional during work. This will also bridge the gap until his lumbar RFA we can try to wean this medication down. I have refilled his [place] today. We will follow-up after his confirmatory test blocks. Plan: The following treatment recommendations and plan were discussed in detail with Michael Philip.     Imaging:   I have reviewed patients imaging of MRI L-spine (report) and results were discussed with patient today. Analgesics: We will continue his Norco 5 mg twice daily as needed while we are working on his lumbar RFA. This medication is only to bridge him to his lumbar radiofrequency ablation procedure. In addition, this medication is to help keep him more functional and continue to work. I explained the risks of using this medication long-term including risk of developing tolerance, physical dependence, and abuse potential of medication. I advised the patient on to take this medication when pain is severe (pain greater than 7/10). In addition, I advised the patient to keep this medication stored in a safe place. If the medication is stolen, the patient will not get refills on the medication. OARRS reviewed today and is appropriate. Pain contract agreement signed today. Patient is taking Acetaminophen. Patient informed that the maximum amount of acetaminophen taken on a regular basis should only be 4000 mg per day. Adjuvants:   Patient can continue baclofen 10 mg 3 times a day as needed. Interventions: In presence of lumbar axial back pain and with physical exam consistent for facetal pain and significant response to diagnostic lumbar medial branch blocks, the option of confirmatory medial branch blocks at bilateral L4/L5 and L5/S1 was chosen. The risks and benefits were discussed in detail with the patient. Patient wants to proceed with the injection. Anticoagulation/NPO Recommendations:   Patient does not need to hold any medications prior to the procedure. Patient will need to be NPO x 8 hours prior to the procedure. We will start an IV prior to the procedure. Multidisciplinary Pain Management:   In the presence of complex, chronic, and multi-factorial pain, the importance of a multidisciplinary approach to pain management in the patients management regimen was emphasized and discussed in great detail.    PHYSICAL THERAPY:  I would advise the patient to continue gentle range of motion stretching exercises twice a day    Referrals:  None    Prescriptions Written This Visit:   Norco 5 mg (#60, 0 refills)    Follow-up: For confirmatory lumbar MBBs      Destin Braxton, DO  Interventional Pain Management/PM&R   [place]

## 2021-03-03 NOTE — TELEPHONE ENCOUNTER
Patient is calling in regarding his norco prescription that was sent to [facility] today 3/3/2021. He said the pharmacy will not fill it for him and he is not sure they? Please advise.

## 2021-03-04 ENCOUNTER — TELEPHONE (OUTPATIENT)
Dept: PHYSICAL MEDICINE AND REHAB | Age: 41
End: 2021-03-04

## 2021-03-22 ENCOUNTER — TELEPHONE (OUTPATIENT)
Dept: PHYSICAL MEDICINE AND REHAB | Age: 41
End: 2021-03-22

## 2021-03-22 NOTE — H&P
Today, patient presents for planned  confirmatory medial branch blocks at bilateral L4/L5 and L5/S1. This note is reflective of the patient's previous visit for evaluation. We will proceed with today's planned procedure. Since patient's last visit for evaluation, there have been no interval changes in medical history. Patient has no new numbness, weakness, or focal neurological deficit since evaluation. Patient has no contraindications to injection (no anticoagulation, recent antibiotic intake for active infections, allergies to latex / contrast dye / steroid medications), and has a  present. NPO necessity has been assessed and accepted based on procedure complexity. The risks and benefits of the procedure have been explained including but are not limited to infection, bleeding, paralysis, immediate post procedure weakness, and dizziness; the patient acknowledges understanding and desires to proceed with the procedure. Consented for same procedure. All other questions and concerns were addressed at bedside. See procedure note for full details. Vitals:    03/23/21 0940   BP: 109/66   Pulse: 78   Resp: 15   Temp:    SpO2: 93%      Diagnosis Orders   1. Lumbar facet joint pain  CHG FLUOR NEEDLE/CATH SPINE/PARASPINAL DX/THER ADDON    AK INJ DX/THER AGNT PARAVERT FACET JOINT, LUMBAR/SAC, 1ST LEVEL    AK INJ DX/THER AGNT PARAVERT FACET JOINT, LUMBAR/SAC, 2ND LEVEL    HYDROcodone-acetaminophen (NORCO) 5-325 MG per tablet   2. Myofascial pain     3. Chronic pain syndrome     4. Lumbar spondylosis         Follow up: 3/29/21     Post procedure Instructions: The patient was advised not to drive during the day of the procedure and not to engage in any significant decision making. The patient was also advised to be cautious with walking/activity for 24 hours following today's visit and asked not to engage in over-exertion. After this time, it is ok to resume pre-procedure level of activity.  Patient advised to apply ice to site of injection in situations of pain and discomfort. Patient advised to not submerge site of injection during bath or pool activities for approximately 48 hours post-procedure. Patient attested to understanding post procedure directions / restrictions. All other questions and concerns addressed before patient discharge in ambulatory fashion. Chronic Pain Clinic Note     Encounter Date: 3/3/21    Subjective:   Chief Complaint:   No chief complaint on file. History of Present Illness:   Olimpia Richard is a 45 y.o. male seen in the clinic on 01/20/21 for his history of chronic low back pain. He has a medical history of anxiety/depression and diverticulitis. He has been dealing with low back pain for the last 4 years after he fell off a roof onto a ladder. He describes his pain is primarily in the axial back with some radiation down into bilateral buttocks. He rates his pain a constant 7/10 aching, stabbing pain. His pain is worse when he is upright or doing any activities particularly working. He works as a  . His pain is improved with rest.  He denies any focal leg weakness, new paresthesias, saddle anesthesia, bowel/bladder incontinence. He states his pain is interfering with his everyday activities including work and sleep. Of note, he was previously seen Dr. Leif Neves and underwent diagnostic lumbar medial branch blocks on 11/18/2020 in which he reports benefit for a couple hours. He was previously taking tramadol and Flexeril with no benefit. He underwent physical therapy within the last year with no benefit as well. Today, 3/3/2021, patient presents for plan follow-up after diagnostic lumbar medial branch blocks performed on 2/23/2021. He reports significant relief (>80%) during the day of the procedure. He reports being increasingly sore the day after the procedure.   He states that the Kathy Casey is working out well for him in regards to taking away his severe pain.  He denies any side effects on this medication. He continues to work and feels like this medication is keeping him more functional throughout the workday. Denies any new symptoms including new focal leg weakness, new paresthesias, saddle anesthesia, bowel/bladder incontinence. History of Interventions:   Surgery: No previous back surgeries  Injections: Lumbar MBBs (11/18/20) - noted benefit  Diagnostic lumbar MBBs (2/23/21) - >80% relief    Current Treatment Medications:   Norco 5 mg twice daily as needed-prescribed by me  Paxil - anxiety/depression   Wellbutrin - anxiety/depression  Baclofen 10 mg 3 times daily as needed    Historical Treatment Medications:   NSAIDs - unable to take due to diverticulitis  Flexeril - no benefit  Tramadol - no benefit  Gabapentin - unable to tolerate (worsening anxiety and RLS)  Lyrica - unable to tolerate (worsening anxiety and RLS)    Imaging:  MRI L-spine (7/14/20)        Past Medical History:   Diagnosis Date    Chronic back pain     Depression     GERD (gastroesophageal reflux disease)     History of high blood pressure     Hypertension        Past Surgical History:   Procedure Laterality Date    COLONOSCOPY  12/2020    WNL    PAIN MANAGEMENT PROCEDURE Bilateral 11/18/2020    L3, L4 medial branch and L5 dorsal rami injection, bilateral performed by Osbaldo Christie MD at [facility] Bilateral 2/23/2021    medial branch blocks at bilateral L4/L5 and L5/S1 performed by Laura Henao DO at [address] [facility]       No family history on file.     Social History     Socioeconomic History    Marital status:      Spouse name: Maryjane Moseley Number of children: Not on file    Years of education: Not on file    Highest education level: Not on file   Occupational History    Not on file   Social Needs    Financial resource strain: Not on file    Food insecurity     Worry: Not on file     Inability: Not on file  Transportation needs     Medical: Not on file     Non-medical: Not on file   Tobacco Use    Smoking status: Current Every Day Smoker     Packs/day: 1.00     Types: Cigarettes    Smokeless tobacco: Never Used    Tobacco comment: 0.5 ppd 12/30/20   Substance and Sexual Activity    Alcohol use: Never     Frequency: Never    Drug use: Never    Sexual activity: Not on file   Lifestyle    Physical activity     Days per week: Not on file     Minutes per session: Not on file    Stress: Not on file   Relationships    Social connections     Talks on phone: Not on file     Gets together: Not on file     Attends L[employer] day service: Not on file     Active member of club or organization: Not on file     Attends meetings of clubs or organizations: Not on file     Relationship status: Not on file    Intimate partner violence     Fear of current or ex partner: Not on file     Emotionally abused: Not on file     Physically abused: Not on file     Forced sexual activity: Not on file   Other Topics Concern    Not on file   Social History Narrative    Not on file       Medications & Allergies:   Current Outpatient Medications   Medication Instructions    buPROPion (WELLBUTRIN XL) 150 mg, Oral, EVERY MORNING    HYDROcodone-acetaminophen (NORCO) 5-325 MG per tablet 1 tablet, Oral, 2 TIMES DAILY PRN    hydrOXYzine (VISTARIL) 50 mg, Oral, 3 TIMES DAILY PRN    lisinopril (PRINIVIL;ZESTRIL) 10 mg, Oral, DAILY    pantoprazole (PROTONIX) 40 mg, Oral, 2 TIMES DAILY BEFORE MEALS    PARoxetine (PAXIL) 20 mg, Oral, DAILY    tiZANidine (ZANAFLEX) 4 mg, Oral, EVERY 8 HOURS PRN       No Known Allergies    Review of Systems:   Constitutional: negative for weight changes or fevers  Genitourinary: negative for bowel/bladder incontinence   Musculoskeletal: positive for low back pain  Neurological: negative for any leg weakness or numbness/tingling  Behavioral/Psych: admits to anxiety/depression   All other systems reviewed and are negative    Objective: There were no vitals filed for this visit. Constitutional: Pleasant, no acute distress   Head: Normocephalic, atraumatic   Eyes: Conjunctivae normal   Neck: Supple, symmetrical   Lungs: Normal respiratory effort, non-labored breathing   Cardiovascular: Limbs warm and well perfused   Abdomen: Non-protruded   Musculoskeletal: Muscle bulk symmetric, no atrophy, no gross deformities   · Lumbar Spine: Lumbar paraspinals tender bilaterally. SLR neg bilaterally. Positive facet loading bilaterally. Neurological: Cranial nerves II-XII grossly intact. · Gait - Slightly antalgic gait. Ambulates without assist device. · Motor: No focal motor deficit  Skin: No rashes or lesions visible in low back  Psychological: Cooperative, no exaggerated pain behaviors         Assessment:    Diagnosis Orders   1. Lumbar facet joint pain  CHG FLUOR NEEDLE/CATH SPINE/PARASPINAL DX/THER ADDON    [place] INJ DX/THER AGNT PARAVERT FACET JOINT, LUMBAR/SAC, 1ST LEVEL    [place] INJ DX/THER AGNT PARAVERT FACET JOINT, LUMBAR/SAC, 2ND LEVEL    HYDROcodone-acetaminophen (NORCO) 5-325 MG per tablet   2. Myofascial pain     3. Chronic pain syndrome     4. Lumbar spondylosis           Jessi García is a 45 y.o.male presenting to the pain clinic for evaluation of chronic low back pain. His clinical history and physical examination are consistent with lumbar facet mediated pain and associated myofascial pain. I have set him up for diagnostic lumbar medial branch blocks targeting the facet joints of L4/L5 and L5/S1 bilaterally. He responded significantly to his diagnostic lumbar medial branch blocks and I set him up for confirmatory blocks at these levels. In addition, I have continued his Norco 5 mg twice a day as needed for severe pain as this has helped him stay more functional during work. This will also bridge the gap until his lumbar RFA we can try to wean this medication down. I have refilled his Norco today.   We will follow-up after his confirmatory test blocks. Plan: The following treatment recommendations and plan were discussed in detail with Keon Vivar. Imaging:   I have reviewed patients imaging of MRI L-spine (report) and results were discussed with patient today. Analgesics: We will continue his Norco 5 mg twice daily as needed while we are working on his lumbar RFA. This medication is only to bridge him to his lumbar radiofrequency ablation procedure. In addition, this medication is to help keep him more functional and continue to work. I explained the risks of using this medication long-term including risk of developing tolerance, physical dependence, and abuse potential of medication. I advised the patient on to take this medication when pain is severe (pain greater than 7/10). In addition, I advised the patient to keep this medication stored in a safe place. If the medication is stolen, the patient will not get refills on the medication. OARRS reviewed today and is appropriate. Pain contract agreement signed today. Patient is taking Acetaminophen. Patient informed that the maximum amount of acetaminophen taken on a regular basis should only be 4000 mg per day. Adjuvants:   Patient can continue baclofen 10 mg 3 times a day as needed. Interventions: In presence of lumbar axial back pain and with physical exam consistent for facetal pain and significant response to diagnostic lumbar medial branch blocks, the option of confirmatory medial branch blocks at bilateral L4/L5 and L5/S1 was chosen. The risks and benefits were discussed in detail with the patient. Patient wants to proceed with the injection. Anticoagulation/NPO Recommendations:   Patient does not need to hold any medications prior to the procedure. Patient will need to be NPO x 8 hours prior to the procedure. We will start an IV prior to the procedure.     Multidisciplinary Pain Management:   In the presence of complex, chronic, and multi-factorial pain, the importance of a multidisciplinary approach to pain management in the patients management regimen was emphasized and discussed in great detail.    PHYSICAL THERAPY:  I would advise the patient to continue gentle range of motion stretching exercises twice a day    Referrals:  None    Prescriptions Written This Visit:   Norco 5 mg (#60, 0 refills)    Follow-up: For confirmatory lumbar MBBs      Destin Braxton, DO  Interventional Pain Management/PM&R   [place]

## 2021-03-23 ENCOUNTER — HOSPITAL ENCOUNTER (OUTPATIENT)
Age: 41
Setting detail: OUTPATIENT SURGERY
Discharge: HOME | End: 2021-03-23
Attending: ANESTHESIOLOGY | Admitting: ANESTHESIOLOGY
Payer: COMMERCIAL

## 2021-03-23 ENCOUNTER — APPOINTMENT (OUTPATIENT)
Dept: GENERAL RADIOLOGY | Age: 41
End: 2021-03-23
Attending: ANESTHESIOLOGY
Payer: COMMERCIAL

## 2021-03-23 VITALS
OXYGEN SATURATION: 95 % | RESPIRATION RATE: 16 BRPM | SYSTOLIC BLOOD PRESSURE: 103 MMHG | DIASTOLIC BLOOD PRESSURE: 58 MMHG | HEART RATE: 83 BPM | WEIGHT: 250.4 LBS | BODY MASS INDEX: 35.06 KG/M2 | TEMPERATURE: 97.9 F | HEIGHT: 71 IN

## 2021-03-23 PROCEDURE — 3600000055 HC PAIN LEVEL 3 ADDL 15 MIN: Performed by: ANESTHESIOLOGY

## 2021-03-23 PROCEDURE — 3209999900 FLUORO FOR SURGICAL PROCEDURES

## 2021-03-23 PROCEDURE — 2500000003 HC RX 250 WO HCPCS: Performed by: ANESTHESIOLOGY

## 2021-03-23 PROCEDURE — 7100000010 HC PHASE II RECOVERY - FIRST 15 MIN: Performed by: ANESTHESIOLOGY

## 2021-03-23 PROCEDURE — 2709999900 HC NON-CHARGEABLE SUPPLY: Performed by: ANESTHESIOLOGY

## 2021-03-23 PROCEDURE — 6360000002 HC RX W HCPCS: Performed by: ANESTHESIOLOGY

## 2021-03-23 PROCEDURE — 99152 MOD SED SAME PHYS/QHP 5/>YRS: CPT | Performed by: ANESTHESIOLOGY

## 2021-03-23 PROCEDURE — 64494 INJ PARAVERT F JNT L/S 2 LEV: CPT | Performed by: ANESTHESIOLOGY

## 2021-03-23 PROCEDURE — 3600000054 HC PAIN LEVEL 3 BASE: Performed by: ANESTHESIOLOGY

## 2021-03-23 PROCEDURE — 64493 INJ PARAVERT F JNT L/S 1 LEV: CPT | Performed by: ANESTHESIOLOGY

## 2021-03-23 PROCEDURE — 7100000011 HC PHASE II RECOVERY - ADDTL 15 MIN: Performed by: ANESTHESIOLOGY

## 2021-03-23 RX ORDER — BUPIVACAINE HYDROCHLORIDE 5 MG/ML
INJECTION, SOLUTION PERINEURAL PRN
Status: DISCONTINUED | OUTPATIENT
Start: 2021-03-23 | End: 2021-03-23 | Stop reason: ALTCHOICE

## 2021-03-23 RX ORDER — FENTANYL CITRATE 50 UG/ML
INJECTION, SOLUTION INTRAMUSCULAR; INTRAVENOUS PRN
Status: DISCONTINUED | OUTPATIENT
Start: 2021-03-23 | End: 2021-03-23 | Stop reason: ALTCHOICE

## 2021-03-23 RX ORDER — LIDOCAINE HYDROCHLORIDE 10 MG/ML
INJECTION, SOLUTION EPIDURAL; INFILTRATION; INTRACAUDAL; PERINEURAL PRN
Status: DISCONTINUED | OUTPATIENT
Start: 2021-03-23 | End: 2021-03-23 | Stop reason: ALTCHOICE

## 2021-03-23 RX ORDER — MIDAZOLAM HYDROCHLORIDE 1 MG/ML
INJECTION INTRAMUSCULAR; INTRAVENOUS PRN
Status: DISCONTINUED | OUTPATIENT
Start: 2021-03-23 | End: 2021-03-23 | Stop reason: ALTCHOICE

## 2021-03-23 ASSESSMENT — PAIN - FUNCTIONAL ASSESSMENT: PAIN_FUNCTIONAL_ASSESSMENT: 0-10

## 2021-03-23 ASSESSMENT — PAIN SCALES - GENERAL: PAINLEVEL_OUTOF10: 0

## 2021-03-23 ASSESSMENT — PAIN DESCRIPTION - DESCRIPTORS: DESCRIPTORS: ACHING

## 2021-03-23 NOTE — PROGRESS NOTES
Discharge instructions reviewed. Verbalized understanding.  Skip Lefort will provide ride home and be responsible for care post-op

## 2021-03-23 NOTE — PROCEDURES
Pre-operative Diagnosis: Bilateral lumbar facet pain     Post-operative Diagnosis: Bilateral lumbar facet pain     Procedure: Bilateral lumbar medial branch blocks targeting facet joints of L4/L5 and L5/S1     Procedure Description:  After consent was obtained, the patient was placed in the prone position having pressure points appropriately padded. The lower back was prepped with chloraprep and draped in a sterile fashion. 0.5 cc of 1 % lidocaine was used for local anesthesia of the skin and superficial subcutaneous tissues. Three 22-gauge 3.5-inch needles were advanced under fluoroscopy in an AP view with caudad angulation: one at the sacral ala and two at the junction of the right superior articular process and the transverse process of the L4 and L5 vertebrae. There was no paresthesias, heme, or CSF obtained. Needle placement was confirmed using AP and oblique views. Then 0.5 cc of 0.5% bupivacaine was injected at the site without complications. The procedure was repeated at L4, L5, and sacral ala on the left side. The patient tolerated the procedure well, transported to the recovery room, and observed for 15 minutes prior to being discharged in ambulatory fashion.      Procedural Complications: None      IV sedation was used prior to the start of the procedure:  Medications Administered: 1 mg Versed, 100 mcg Fentanyl  Start Time: 9:33  End Time : 9:48  Total Time: 15 minutes      Kelsea Kramer DO  Interventional Pain Management/PM&R   [address] [facility]

## 2021-03-23 NOTE — PROGRESS NOTES
4408: Patient to phase 2 recovery room via cart. Patient is awake and alert. Report received from surgical RN, Renetta Webster. Patient's vitals obtained, see charting. 0945: Patient is denying pain and nausea at this time. IV is INT'd in his hand. 6140: Patient is denying wanting anything to eat/drink at this time. Bed is in the lowest position and call light is within reach. Dr. Anthony Watt is at the bedside. 1933: Patient's IV removed at this time- no complications and dressing applied. Patient stating he understood his discharge instructions. 1002: Patient ambulated to the car and discharged home in stable condition with his friend.

## 2021-03-25 ENCOUNTER — TELEPHONE (OUTPATIENT)
Dept: FAMILY MEDICINE CLINIC | Age: 41
End: 2021-03-25

## 2021-03-25 NOTE — TELEPHONE ENCOUNTER
[facility] Transitions Initial Follow Up Call    Call within 2 business days of discharge: Yes     Patient: Emily Oliver Patient : 1980 MRN: [id number]    [unfilled]    RARS: Readmission Risk Score: 6       Spoke with: patient follows with pain management    Discharge department/facility: [facility]    Non-face-to-face services provided:  Scheduled appointment with Specialist-pt follows with pain management 3-29-21    Follow Up  Future Appointments   Date Time Provider Nick Naranjo   3/29/2021  8:00 AM DO FAUSTO Waddell [facility] [place]   2021  1:30 PM Christopher Siddiqi, [address] [place] [address] ([address] [place] [address])

## 2021-03-29 ENCOUNTER — TELEPHONE (OUTPATIENT)
Dept: PHYSICAL MEDICINE AND REHAB | Age: 41
End: 2021-03-29

## 2021-03-29 ENCOUNTER — OFFICE VISIT (OUTPATIENT)
Dept: PHYSICAL MEDICINE AND REHAB | Age: 41
End: 2021-03-29
Payer: COMMERCIAL

## 2021-03-29 VITALS
BODY MASS INDEX: 35 KG/M2 | DIASTOLIC BLOOD PRESSURE: 88 MMHG | TEMPERATURE: 97.2 F | WEIGHT: 250 LBS | HEIGHT: 71 IN | SYSTOLIC BLOOD PRESSURE: 126 MMHG

## 2021-03-29 DIAGNOSIS — M47.816 LUMBAR SPONDYLOSIS: ICD-10-CM

## 2021-03-29 DIAGNOSIS — G89.4 CHRONIC PAIN SYNDROME: ICD-10-CM

## 2021-03-29 DIAGNOSIS — M54.59 LUMBAR FACET JOINT PAIN: Primary | ICD-10-CM

## 2021-03-29 DIAGNOSIS — M79.18 MYOFASCIAL PAIN: ICD-10-CM

## 2021-03-29 PROCEDURE — 99214 OFFICE O/P EST MOD 30 MIN: CPT | Performed by: ANESTHESIOLOGY

## 2021-03-29 RX ORDER — HYDROCODONE BITARTRATE AND ACETAMINOPHEN 5; 325 MG/1; MG/1
1 TABLET ORAL 2 TIMES DAILY PRN
Qty: 60 TABLET | Refills: 0 | Status: SHIPPED | OUTPATIENT
Start: 2021-04-03 | End: 2021-05-03

## 2021-03-29 NOTE — PROGRESS NOTES
Chronic Pain Clinic Note     Encounter Date: 3/29/21    Subjective:   Chief Complaint:   Chief Complaint   Patient presents with    Follow Up After Procedure       History of Present Illness:   Francoise Bradley is a 45 y.o. male seen in the clinic on 01/20/21 for his history of chronic low back pain. He has a medical history of anxiety/depression and diverticulitis. He has been dealing with low back pain for the last 4 years after he fell off a roof onto a ladder. He describes his pain is primarily in the axial back with some radiation down into bilateral buttocks. He rates his pain a constant 7/10 aching, stabbing pain. His pain is worse when he is upright or doing any activities particularly working. He works as a  . His pain is improved with rest.  He denies any focal leg weakness, new paresthesias, saddle anesthesia, bowel/bladder incontinence. He states his pain is interfering with his everyday activities including work and sleep. Of note, he was previously seen Dr. Kenton Caller and underwent diagnostic lumbar medial branch blocks on 11/18/2020 in which he reports benefit for a couple hours. He was previously taking tramadol and Flexeril with no benefit. He underwent physical therapy within the last year with no benefit as well. 3/3/2021: Patient presents for plan follow-up after diagnostic lumbar medial branch blocks performed on 2/23/2021. He reports significant relief (>80%) during the day of the procedure. He reports being increasingly sore the day after the procedure. He states that the [address] [facility] is working out well for him in regards to taking away his severe pain. He denies any side effects on this medication. He continues to work and feels like this medication is keeping him more functional throughout the workday. Denies any new symptoms including new focal leg weakness, new paresthesias, saddle anesthesia, bowel/bladder incontinence.     Today, 3/29/21, patient presents for planned follow-up after confirmatory medial branch blocks on 3/23/2021. He reports significant relief (100%) for the entire day after the procedure. He states that he is able to move around without any low back pain. He was able to stand upright and do chores without any back pain as well. He wants to proceed with the thermal radiofrequency ablation. Denies any new symptoms including new focal leg weakness, new paresthesias, saddle anesthesia, bowel/bladder incontinence.        History of Interventions:   Surgery: No previous back surgeries  Injections: Lumbar MBBs (11/18/20) - noted benefit  Diagnostic and confirmatory lumbar MBBs (2/23/21 and 3/23/21) - >80% relief and 100% relief respectively    Current Treatment Medications:   Norco 5 mg twice daily as neededprescribed by me  Paxil - anxiety/depression   Wellbutrin - anxiety/depression  Baclofen 10 mg 3 times daily as needed    Historical Treatment Medications:   NSAIDs - unable to take due to diverticulitis  Flexeril - no benefit  Tramadol - no benefit  Gabapentin - unable to tolerate (worsening anxiety and RLS)  Lyrica - unable to tolerate (worsening anxiety and RLS)    Imaging:  MRI L-spine (7/14/20)        Past Medical History:   Diagnosis Date    Chronic back pain     Depression     GERD (gastroesophageal reflux disease)     History of high blood pressure     Hypertension        Past Surgical History:   Procedure Laterality Date    COLONOSCOPY  12/2020    WNL    PAIN MANAGEMENT PROCEDURE Bilateral 11/18/2020    L3, L4 medial branch and L5 dorsal rami injection, bilateral performed by Arleene Fleischer, MD at [facility] Bilateral 2/23/2021    medial branch blocks at bilateral L4/L5 and L5/S1 performed by John Mata DO at [facility] Bilateral 3/23/2021    confirmatory medial branch blocks at bilateral L4/L5 and L5/S1 performed by John Mata DO at [place] [facility]       History reviewed. No pertinent family history.     Social History     Socioeconomic History    Marital status:      Spouse name: Brielle Walton Number of children: Not on file    Years of education: Not on file    Highest education level: Not on file   Occupational History    Not on file   Social Needs    Financial resource strain: Not on file    Food insecurity     Worry: Not on file     Inability: Not on file    Transportation needs     Medical: Not on file     Non-medical: Not on file   Tobacco Use    Smoking status: Current Every Day Smoker     Packs/day: 1.00     Types: Cigarettes    Smokeless tobacco: Never Used   Substance and Sexual Activity    Alcohol use: Never     Frequency: Never    Drug use: Never    Sexual activity: Not on file   Lifestyle    Physical activity     Days per week: Not on file     Minutes per session: Not on file    Stress: Not on file   Relationships    Social connections     Talks on phone: Not on file     Gets together: Not on file     Attends Protestant service: Not on file     Active member of club or organization: Not on file     Attends meetings of clubs or organizations: Not on file     Relationship status: Not on file    Intimate partner violence     Fear of current or ex partner: Not on file     Emotionally abused: Not on file     Physically abused: Not on file     Forced sexual activity: Not on file   Other Topics Concern    Not on file   Social History Narrative    Not on file       Medications & Allergies:   Current Outpatient Medications   Medication Instructions    buPROPion (WELLBUTRIN XL) 150 mg, Oral, EVERY MORNING    [START ON 4/3/2021] HYDROcodone-acetaminophen (NORCO) 5-325 MG per tablet 1 tablet, Oral, 2 TIMES DAILY PRN    hydrOXYzine (VISTARIL) 50 MG capsule TAKE 1 CAPSULE BY MOUTH THREE TIMES DAILY AS NEEDED FOR ANXIETY    lisinopril (PRINIVIL;ZESTRIL) 10 mg, Oral, DAILY    pantoprazole (PROTONIX) 40 mg, Oral, 2 TIMES DAILY BEFORE MEALS    PARoxetine (PAXIL) 20 mg, Oral, DAILY    tiZANidine (ZANAFLEX) 4 mg, Oral, EVERY 8 HOURS PRN       No Known Allergies    Review of Systems:   Constitutional: negative for weight changes or fevers  Genitourinary: negative for bowel/bladder incontinence   Musculoskeletal: positive for low back pain  Neurological: negative for any leg weakness or numbness/tingling  Behavioral/Psych: admits to anxiety/depression   All other systems reviewed and are negative    Objective:     Vitals:    03/29/21 0804   BP: 126/88   Temp: 97.2 °F (36.2 °C)       Constitutional: Pleasant, no acute distress   Head: Normocephalic, atraumatic   Eyes: Conjunctivae normal   Neck: Supple, symmetrical   Lungs: Normal respiratory effort, non-labored breathing   Cardiovascular: Limbs warm and well perfused   Abdomen: Non-protruded   Musculoskeletal: Muscle bulk symmetric, no atrophy, no gross deformities   · Lumbar Spine: Lumbar paraspinals tender bilaterally. SLR neg bilaterally. Positive facet loading bilaterally. Neurological: Cranial nerves II-XII grossly intact. · Gait - Slightly antalgic gait. Ambulates without assist device. · Motor: No focal motor deficit  Skin: No rashes or lesions visible in low back  Psychological: Cooperative, no exaggerated pain behaviors     Assessment:    Diagnosis Orders   1. Lumbar facet joint pain  CHG FLUOR NEEDLE/CATH SPINE/PARASPINAL DX/THER ADDON    FL RADIOFREQ NEUROTOMY LUMBAR OR SACRAL, W IMAGE GUIDE,EA ADDL LEVEL    FL RADIOFREQUENCY NEUROTOMY LUMBAR OR SACRAL, W IMAGE GUIDANCE, SINGLE    HYDROcodone-acetaminophen (NORCO) 5-325 MG per tablet   2. Myofascial pain     3. Chronic pain syndrome     4. Lumbar spondylosis           Abdifatah Mann is a 45 y.o.male presenting to the pain clinic for evaluation of chronic low back pain. His clinical history and physical examination are consistent with lumbar facet mediated pain and associated myofascial pain.   I have set him up for diagnostic lumbar medial branch blocks targeting the facet joints of L4/L5 and L5/S1 bilaterally. In addition, I have continued his Norco 5 mg twice a day as needed for severe pain as this has helped him stay more functional during work. He responded significantly to both diagnostic and confirmatory lumbar medial branch blocks. I have set him up for right-sided thermal radiofrequency ablation targeting the facet joints of L4/L5 and L5/S1. We will follow-up in 2 weeks and then proceed with left-sided RFA. [place] prescription refilled today. Plan: The following treatment recommendations and plan were discussed in detail with Edmund Torres. Imaging:   I have reviewed patients imaging of MRI L-spine (report) and results were discussed with patient today. Analgesics: We will continue his Norco 5 mg twice daily as needed while we are working on his lumbar RFA. This medication is only to bridge him to his lumbar radiofrequency ablation procedure. In addition, this medication is to help keep him more functional and continue to work. I explained the risks of using this medication long-term including risk of developing tolerance, physical dependence, and abuse potential of medication. I advised the patient on to take this medication when pain is severe (pain greater than 7/10). In addition, I advised the patient to keep this medication stored in a safe place. If the medication is stolen, the patient will not get refills on the medication. OARRS reviewed today and is appropriate. Pain contract agreement signed today. Patient is taking Acetaminophen. Patient informed that the maximum amount of acetaminophen taken on a regular basis should only be 4000 mg per day. Adjuvants:   Patient can continue baclofen 10 mg 3 times a day as needed. Interventions:    In presence of lumbar axial back pain and with physical exam consistent for facetal pain and significant response to diagnostic and confirmatory lumbar medial branch blocks, the option of thermal radiofrequency ablation at RIGHT medial branches at L4/L5 and L5/S1 was chosen. The risks and benefits were discussed in detail with the patient. Patient wants to proceed with the injection. Anticoagulation/NPO Recommendations:   Patient does not need to hold any medications prior to the procedure. Patient will need to be NPO x 8 hours prior to the procedure. We will start an IV prior to the procedure. Multidisciplinary Pain Management:   In the presence of complex, chronic, and multi-factorial pain, the importance of a multidisciplinary approach to pain management in the patients management regimen was emphasized and discussed in great detail.    PHYSICAL THERAPY:  I would advise the patient to continue gentle range of motion stretching exercises twice a day    Referrals:  None    Prescriptions Written This Visit:   Norco 5 mg (#60, 0 refills); refill 4/3    Follow-up: For RIGHT-sided lumbar RFA      Destin Braxton, DO  Interventional Pain Management/PM&R   [place]

## 2021-03-29 NOTE — TELEPHONE ENCOUNTER
Pt denies being prescribed blood thinners. Dermal Autograft Text: The defect edges were debeveled with a #15 scalpel blade.  Given the location of the defect, shape of the defect and the proximity to free margins a dermal autograft was deemed most appropriate.  Using a sterile surgical marker, the primary defect shape was transferred to the donor site. The area thus outlined was incised deep to adipose tissue with a #15 scalpel blade.  The harvested graft was then trimmed of adipose and epidermal tissue until only dermis was left.  The skin graft was then placed in the primary defect and oriented appropriately.

## 2021-03-29 NOTE — PATIENT INSTRUCTIONS
The following treatment recommendations and plan were discussed in detail with Glenn Mckeon. Imaging:   I have reviewed patients imaging of MRI L-spine (report) and results were discussed with patient today. Analgesics: We will continue his Norco 5 mg twice daily as needed while we are working on his lumbar RFA. This medication is only to bridge him to his lumbar radiofrequency ablation procedure. In addition, this medication is to help keep him more functional and continue to work. I explained the risks of using this medication long-term including risk of developing tolerance, physical dependence, and abuse potential of medication. I advised the patient on to take this medication when pain is severe (pain greater than 7/10). In addition, I advised the patient to keep this medication stored in a safe place. If the medication is stolen, the patient will not get refills on the medication. OARRS reviewed today and is appropriate. Pain contract agreement signed today. Patient is taking Acetaminophen. Patient informed that the maximum amount of acetaminophen taken on a regular basis should only be 4000 mg per day. Adjuvants:   Patient can continue baclofen 10 mg 3 times a day as needed. Interventions: In presence of lumbar axial back pain and with physical exam consistent for facetal pain and significant response to diagnostic and confirmatory lumbar medial branch blocks, the option of thermal radiofrequency ablation at RIGHT medial branches at L4/L5 and L5/S1 was chosen. The risks and benefits were discussed in detail with the patient. Patient wants to proceed with the injection. Anticoagulation/NPO Recommendations:   Patient does not need to hold any medications prior to the procedure. Patient will need to be NPO x 8 hours prior to the procedure. We will start an IV prior to the procedure.     Multidisciplinary Pain Management:   In the presence of complex, chronic, and multi-factorial pain, the importance of a multidisciplinary approach to pain management in the patients management regimen was emphasized and discussed in great detail.    PHYSICAL THERAPY:  I would advise the patient to continue gentle range of motion stretching exercises twice a day    Referrals:  None    Prescriptions Written This Visit:   Norco 5 mg (#60, 0 refills)    Follow-up: For RIGHT-sided lumbar RFA

## 2021-04-01 ENCOUNTER — OFFICE VISIT (OUTPATIENT)
Dept: UROLOGY | Age: 41
End: 2021-04-01
Payer: COMMERCIAL

## 2021-04-01 VITALS — WEIGHT: 253 LBS | HEIGHT: 71 IN | BODY MASS INDEX: 35.42 KG/M2 | TEMPERATURE: 97.3 F

## 2021-04-01 DIAGNOSIS — R30.0 DYSURIA: Primary | ICD-10-CM

## 2021-04-01 DIAGNOSIS — R39.12 WEAK URINARY STREAM: ICD-10-CM

## 2021-04-01 LAB
BILIRUBIN URINE: NEGATIVE
BLOOD URINE, POC: NEGATIVE
CHARACTER, URINE: CLEAR
COLOR, URINE: YELLOW
GLUCOSE URINE: NEGATIVE MG/DL
KETONES, URINE: NEGATIVE
LEUKOCYTE CLUMPS, URINE: NEGATIVE
NITRITE, URINE: NEGATIVE
PH, URINE: 7.5 (ref 5–9)
POST VOID RESIDUAL (PVR): 90 ML
PROTEIN, URINE: NEGATIVE MG/DL
SPECIFIC GRAVITY, URINE: 1.02 (ref 1–1.03)
UROBILINOGEN, URINE: 0.2 EU/DL (ref 0–1)

## 2021-04-01 PROCEDURE — 51798 US URINE CAPACITY MEASURE: CPT | Performed by: UROLOGY

## 2021-04-01 PROCEDURE — 81003 URINALYSIS AUTO W/O SCOPE: CPT | Performed by: UROLOGY

## 2021-04-01 PROCEDURE — 99212 OFFICE O/P EST SF 10 MIN: CPT | Performed by: UROLOGY

## 2021-04-15 RX ORDER — TIZANIDINE 4 MG/1
TABLET ORAL
Qty: 90 TABLET | Refills: 2 | Status: SHIPPED | OUTPATIENT
Start: 2021-04-15 | End: 2021-07-12

## 2021-04-15 NOTE — TELEPHONE ENCOUNTER
OARRS reviewed. UDS: Inconsistent Mitragynine. Last seen: 3/29/2021.  Follow-up:   Future Appointments   Date Time Provider [facility]   4/29/2021  9:00 AM Mendy Stewart, [address]   5/6/2021  8:00 AM DO FAUSTO Richards [facility] - [place] [email or web address]

## 2021-04-17 NOTE — H&P
Today, patient presents for planned thermal radiofrequency ablation at RIGHT medial branches at L4/L5 and L5/S1. This note is reflective of the patient's previous visit for evaluation. We will proceed with today's planned procedure. Since patient's last visit for evaluation, there have been no interval changes in medical history. Patient has no new numbness, weakness, or focal neurological deficit since evaluation. Patient has no contraindications to injection (no anticoagulation, recent antibiotic intake for active infections, allergies to latex / contrast dye / steroid medications), and has a  present. NPO necessity has been assessed and accepted based on procedure complexity. The risks and benefits of the procedure have been explained including but are not limited to infection, bleeding, paralysis, immediate post procedure weakness, and dizziness; the patient acknowledges understanding and desires to proceed with the procedure. Consented for same procedure. All other questions and concerns were addressed at bedside. See procedure note for full details. Vitals:    04/20/21 0723   BP: 131/75   Pulse: 82   Resp: 20   Temp: 97.4 °F (36.3 °C)   SpO2: 97%       Follow up: 5/6/21     Post procedure Instructions: The patient was advised not to drive during the day of the procedure and not to engage in any significant decision making. The patient was also advised to be cautious with walking/activity for 24 hours following today's visit and asked not to engage in over-exertion. After this time, it is ok to resume pre-procedure level of activity. Patient advised to apply ice to site of injection in situations of pain and discomfort. Patient advised to not submerge site of injection during bath or pool activities for approximately 48 hours post-procedure. Patient attested to understanding post procedure directions / restrictions.  All other questions and concerns addressed before patient discharge in ambulatory fashion. Chronic Pain Clinic Note     Encounter Date: 3/29/21    Subjective:   Chief Complaint:   No chief complaint on file. History of Present Illness:   Missy Anderson is a 45 y.o. male seen in the clinic on 01/20/21 for his history of chronic low back pain. He has a medical history of anxiety/depression and diverticulitis. He has been dealing with low back pain for the last 4 years after he fell off a roof onto a ladder. He describes his pain is primarily in the axial back with some radiation down into bilateral buttocks. He rates his pain a constant 7/10 aching, stabbing pain. His pain is worse when he is upright or doing any activities particularly working. He works as a  . His pain is improved with rest.  He denies any focal leg weakness, new paresthesias, saddle anesthesia, bowel/bladder incontinence. He states his pain is interfering with his everyday activities including work and sleep. Of note, he was previously seen Dr. Kathy Aguilera and underwent diagnostic lumbar medial branch blocks on 11/18/2020 in which he reports benefit for a couple hours. He was previously taking tramadol and Flexeril with no benefit. He underwent physical therapy within the last year with no benefit as well. 3/3/2021: Patient presents for plan follow-up after diagnostic lumbar medial branch blocks performed on 2/23/2021. He reports significant relief (>80%) during the day of the procedure. He reports being increasingly sore the day after the procedure. He states that the [address] [facility] is working out well for him in regards to taking away his severe pain. He denies any side effects on this medication. He continues to work and feels like this medication is keeping him more functional throughout the workday. Denies any new symptoms including new focal leg weakness, new paresthesias, saddle anesthesia, bowel/bladder incontinence.     Today, 3/29/21, patient presents for planned follow-up after confirmatory medial branch blocks on 3/23/2021. He reports significant relief (100%) for the entire day after the procedure. He states that he is able to move around without any low back pain. He was able to stand upright and do chores without any back pain as well. He wants to proceed with the thermal radiofrequency ablation. Denies any new symptoms including new focal leg weakness, new paresthesias, saddle anesthesia, bowel/bladder incontinence.        History of Interventions:   Surgery: No previous back surgeries  Injections: Lumbar MBBs (11/18/20) - noted benefit  Diagnostic and confirmatory lumbar MBBs (2/23/21 and 3/23/21) - >80% relief and 100% relief respectively    Current Treatment Medications:   Norco 5 mg twice daily as needed-prescribed by me  Paxil - anxiety/depression   Wellbutrin - anxiety/depression  Baclofen 10 mg 3 times daily as needed    Historical Treatment Medications:   NSAIDs - unable to take due to diverticulitis  Flexeril - no benefit  Tramadol - no benefit  Gabapentin - unable to tolerate (worsening anxiety and RLS)  Lyrica - unable to tolerate (worsening anxiety and RLS)    Imaging:  MRI L-spine (7/14/20)        Past Medical History:   Diagnosis Date    Chronic back pain     Depression     GERD (gastroesophageal reflux disease)     History of high blood pressure     Hypertension        Past Surgical History:   Procedure Laterality Date    COLONOSCOPY  12/2020    WNL    PAIN MANAGEMENT PROCEDURE Bilateral 11/18/2020    L3, L4 medial branch and L5 dorsal rami injection, bilateral performed by Annabel De Anda MD at [facility] Bilateral 2/23/2021    medial branch blocks at bilateral L4/L5 and L5/S1 performed by Parish Gee DO at [facility] Bilateral 3/23/2021    confirmatory medial branch blocks at bilateral L4/L5 and L5/S1 performed by Parish Gee DO at [address] [place] [address] No family history on file.     Social History     Socioeconomic History    Marital status:      Spouse name: Demetria Gatica Number of children: Not on file    Years of education: Not on file    Highest education level: Not on file   Occupational History    Not on file   Social Needs    Financial resource strain: Not on file    Food insecurity     Worry: Not on file     Inability: Not on file    Transportation needs     Medical: Not on file     Non-medical: Not on file   Tobacco Use    Smoking status: Current Every Day Smoker     Packs/day: 1.00     Types: Cigarettes    Smokeless tobacco: Never Used   Substance and Sexual Activity    Alcohol use: Never     Frequency: Never    Drug use: Never    Sexual activity: Not on file   Lifestyle    Physical activity     Days per week: Not on file     Minutes per session: Not on file    Stress: Not on file   Relationships    Social connections     Talks on phone: Not on file     Gets together: Not on file     Attends Anabaptism service: Not on file     Active member of club or organization: Not on file     Attends meetings of clubs or organizations: Not on file     Relationship status: Not on file    Intimate partner violence     Fear of current or ex partner: Not on file     Emotionally abused: Not on file     Physically abused: Not on file     Forced sexual activity: Not on file   Other Topics Concern    Not on file   Social History Narrative    Not on file       Medications & Allergies:   Current Outpatient Medications   Medication Instructions    buPROPion (WELLBUTRIN XL) 150 mg, Oral, EVERY MORNING    HYDROcodone-acetaminophen (NORCO) 5-325 MG per tablet 1 tablet, Oral, 2 TIMES DAILY PRN    hydrOXYzine (VISTARIL) 50 MG capsule TAKE 1 CAPSULE BY MOUTH THREE TIMES DAILY AS NEEDED FOR ANXIETY    lisinopril (PRINIVIL;ZESTRIL) 10 mg, Oral, DAILY    pantoprazole (PROTONIX) 40 mg, Oral, 2 TIMES DAILY BEFORE MEALS    PARoxetine (PAXIL) 20 mg, Oral, DAILY    tiZANidine (ZANAFLEX) 4 MG tablet TAKE 1 TABLET BY MOUTH EVERY 8 HOURS AS NEEDED FOR MUSCLE TIGHTNESS/SPASMS       No Known Allergies    Review of Systems:   Constitutional: negative for weight changes or fevers  Genitourinary: negative for bowel/bladder incontinence   Musculoskeletal: positive for low back pain  Neurological: negative for any leg weakness or numbness/tingling  Behavioral/Psych: admits to anxiety/depression   All other systems reviewed and are negative    Objective: There were no vitals filed for this visit. Constitutional: Pleasant, no acute distress   Head: Normocephalic, atraumatic   Eyes: Conjunctivae normal   Neck: Supple, symmetrical   Lungs: Normal respiratory effort, non-labored breathing   Cardiovascular: Limbs warm and well perfused   Abdomen: Non-protruded   Musculoskeletal: Muscle bulk symmetric, no atrophy, no gross deformities   · Lumbar Spine: Lumbar paraspinals tender bilaterally. SLR neg bilaterally. Positive facet loading bilaterally. Neurological: Cranial nerves II-XII grossly intact. · Gait - Slightly antalgic gait. Ambulates without assist device. · Motor: No focal motor deficit  Skin: No rashes or lesions visible in low back  Psychological: Cooperative, no exaggerated pain behaviors     Assessment:    Diagnosis Orders   1. Lumbar facet joint pain  CHG FLUOR NEEDLE/CATH SPINE/PARASPINAL DX/THER ADDON    AR RADIOFREQ NEUROTOMY LUMBAR OR SACRAL, W IMAGE GUIDE,EA ADDL LEVEL    AR RADIOFREQUENCY NEUROTOMY LUMBAR OR SACRAL, W IMAGE GUIDANCE, SINGLE    HYDROcodone-acetaminophen (NORCO) 5-325 MG per tablet   2. Myofascial pain     3. Chronic pain syndrome     4. Lumbar spondylosis           Carlota Lisa is a [address] [facility] y.o.male presenting to the pain clinic for evaluation of chronic low back pain. His clinical history and physical examination are consistent with lumbar facet mediated pain and associated myofascial pain.   I have set him up for diagnostic lumbar medial branch blocks, the option of thermal radiofrequency ablation at RIGHT medial branches at L4/L5 and L5/S1 was chosen. The risks and benefits were discussed in detail with the patient. Patient wants to proceed with the injection. Anticoagulation/NPO Recommendations:   Patient does not need to hold any medications prior to the procedure. Patient will need to be NPO x 8 hours prior to the procedure. We will start an IV prior to the procedure. Multidisciplinary Pain Management:   In the presence of complex, chronic, and multi-factorial pain, the importance of a multidisciplinary approach to pain management in the patients management regimen was emphasized and discussed in great detail.    PHYSICAL THERAPY:  I would advise the patient to continue gentle range of motion stretching exercises twice a day    Referrals:  None    Prescriptions Written This Visit:   Norco 5 mg (#60, 0 refills); refill 4/3    Follow-up: For RIGHT-sided lumbar RFA      Destin Braxton, DO  Interventional Pain Management/PM&R   [place]

## 2021-04-19 ENCOUNTER — TELEPHONE (OUTPATIENT)
Dept: PHYSICAL MEDICINE AND REHAB | Age: 41
End: 2021-04-19

## 2021-04-20 ENCOUNTER — HOSPITAL ENCOUNTER (OUTPATIENT)
Age: 41
Setting detail: OUTPATIENT SURGERY
Discharge: HOME | End: 2021-04-20
Attending: ANESTHESIOLOGY | Admitting: ANESTHESIOLOGY
Payer: COMMERCIAL

## 2021-04-20 ENCOUNTER — APPOINTMENT (OUTPATIENT)
Dept: GENERAL RADIOLOGY | Age: 41
End: 2021-04-20
Attending: ANESTHESIOLOGY
Payer: COMMERCIAL

## 2021-04-20 VITALS
DIASTOLIC BLOOD PRESSURE: 55 MMHG | HEART RATE: 78 BPM | HEIGHT: 71 IN | SYSTOLIC BLOOD PRESSURE: 127 MMHG | OXYGEN SATURATION: 93 % | WEIGHT: 259.4 LBS | TEMPERATURE: 96.4 F | RESPIRATION RATE: 14 BRPM | BODY MASS INDEX: 36.31 KG/M2

## 2021-04-20 PROCEDURE — 64635 DESTROY LUMB/SAC FACET JNT: CPT | Performed by: ANESTHESIOLOGY

## 2021-04-20 PROCEDURE — 64636 DESTROY L/S FACET JNT ADDL: CPT | Performed by: ANESTHESIOLOGY

## 2021-04-20 PROCEDURE — 7100000011 HC PHASE II RECOVERY - ADDTL 15 MIN: Performed by: ANESTHESIOLOGY

## 2021-04-20 PROCEDURE — 3600000055 HC PAIN LEVEL 3 ADDL 15 MIN: Performed by: ANESTHESIOLOGY

## 2021-04-20 PROCEDURE — 6360000002 HC RX W HCPCS: Performed by: ANESTHESIOLOGY

## 2021-04-20 PROCEDURE — 7100000010 HC PHASE II RECOVERY - FIRST 15 MIN: Performed by: ANESTHESIOLOGY

## 2021-04-20 PROCEDURE — 99152 MOD SED SAME PHYS/QHP 5/>YRS: CPT | Performed by: ANESTHESIOLOGY

## 2021-04-20 PROCEDURE — 3600000054 HC PAIN LEVEL 3 BASE: Performed by: ANESTHESIOLOGY

## 2021-04-20 PROCEDURE — 2709999900 HC NON-CHARGEABLE SUPPLY: Performed by: ANESTHESIOLOGY

## 2021-04-20 PROCEDURE — 3209999900 FLUORO FOR SURGICAL PROCEDURES

## 2021-04-20 PROCEDURE — 2500000003 HC RX 250 WO HCPCS: Performed by: ANESTHESIOLOGY

## 2021-04-20 RX ORDER — FENTANYL CITRATE 50 UG/ML
INJECTION, SOLUTION INTRAMUSCULAR; INTRAVENOUS PRN
Status: DISCONTINUED | OUTPATIENT
Start: 2021-04-20 | End: 2021-04-20 | Stop reason: ALTCHOICE

## 2021-04-20 RX ORDER — LIDOCAINE HYDROCHLORIDE 10 MG/ML
INJECTION, SOLUTION EPIDURAL; INFILTRATION; INTRACAUDAL; PERINEURAL PRN
Status: DISCONTINUED | OUTPATIENT
Start: 2021-04-20 | End: 2021-04-20 | Stop reason: ALTCHOICE

## 2021-04-20 RX ORDER — LIDOCAINE HYDROCHLORIDE 20 MG/ML
INJECTION, SOLUTION EPIDURAL; INFILTRATION; INTRACAUDAL; PERINEURAL PRN
Status: DISCONTINUED | OUTPATIENT
Start: 2021-04-20 | End: 2021-04-20 | Stop reason: ALTCHOICE

## 2021-04-20 RX ORDER — MIDAZOLAM HYDROCHLORIDE 1 MG/ML
INJECTION INTRAMUSCULAR; INTRAVENOUS PRN
Status: DISCONTINUED | OUTPATIENT
Start: 2021-04-20 | End: 2021-04-20 | Stop reason: ALTCHOICE

## 2021-04-20 ASSESSMENT — PAIN DESCRIPTION - DESCRIPTORS: DESCRIPTORS: CONSTANT;ACHING;SHARP

## 2021-04-20 NOTE — PROCEDURES
Pre-operative Diagnosis: Lumbar facet pain     Post-operative Diagnosis: Lumbar facet pain     Procedure: RIGHT lumbar thermal radiofrequency ablation targeting L4/L5 and L5/S1    Procedure Description:  After consent was obtained, the patient was placed in the prone position having pressure points appropriately padded. The lower back was prepped with chloraprep and draped in a sterile fashion. 0.5 cc of 1 % lidocaine was used for local anesthesia of the skin and superficial subcutaneous tissues. Three 22-gauge 100mm SMK cannula with a 10-mm active tip was advanced under fluoroscopy in an AP view with caudad angulation on to junction of the right superior articular process and the transverse process of the L4 and L5 vertebra and at the sacral ala. There were no paresthesias, heme or CSF obtained. Needle placement was confirmed using AP and oblique views. Sensory and motor stimulation at 50Hz and 2Hz and impedance measurements were carried out having reached threshold on the right side at:     RIGHT  L4: 0.2V/3V/180 Ohms  L5: 0.2V/3V/185 Ohms  SA: 0.2V/3V/190 Ohms       Then, 1cc of 2 % Lidocaine was injected at the site. Temperature was then raised to 80 degrees centigrade for 90 seconds with a 15 second temperature ramp. No pain was reported during the lesioning. The needles were then withdrawn without complications. The patient tolerated the procedure well and was transported to the recovery room where he was observed for 15 minutes to then be discharged in ambulatory fashion.     Procedural Complications: None    IV sedation was used during the procedure:  - Moderate intravenous conscious sedation was supervised by Dr. Tanvir Desouza  - The patient was independently monitored by a Registered Nurse assigned to the procedure room  - Monitoring included automated blood pressure, continuous EKG, and continuous pulse oximetry  - The detailed conscious record is permanently stored in the [place] [id number]  -

## 2021-04-20 NOTE — PROGRESS NOTES
4260: Patient arrived to Phase II recovery via cart. Eyes closed but responding to voice. Report received from Eloisa Sharma, [address]: VSS, patient rates pain 6/10 to lower back. Injection sites clean, dry and intact with bandaid present. Patient denies snack or drink or ice pack. Lights remained dimmed for decreased stimulation. 3773: IV removed without complications. Band aid applied to site. Patient sat edge of bed without difficulty and denies dizziness or lightheadedness. 6584: Patient discharged home in stable condition with friend.

## 2021-04-29 ENCOUNTER — PROCEDURE VISIT (OUTPATIENT)
Dept: UROLOGY | Age: 41
End: 2021-04-29
Payer: COMMERCIAL

## 2021-04-29 VITALS — HEIGHT: 71 IN | BODY MASS INDEX: 36.26 KG/M2 | WEIGHT: 259 LBS

## 2021-04-29 DIAGNOSIS — R39.12 WEAK URINARY STREAM: Primary | ICD-10-CM

## 2021-04-29 DIAGNOSIS — R30.0 DYSURIA: ICD-10-CM

## 2021-04-29 LAB
BILIRUBIN URINE: NEGATIVE
BLOOD URINE, POC: NORMAL
CHARACTER, URINE: CLEAR
COLOR, URINE: YELLOW
GLUCOSE URINE: NEGATIVE MG/DL
KETONES, URINE: NEGATIVE
LEUKOCYTE CLUMPS, URINE: NEGATIVE
NITRITE, URINE: NEGATIVE
PH, URINE: 7.5 (ref 5–9)
PROTEIN, URINE: NEGATIVE MG/DL
SPECIFIC GRAVITY, URINE: 1.02 (ref 1–1.03)
UROBILINOGEN, URINE: 0.2 EU/DL (ref 0–1)

## 2021-04-29 PROCEDURE — 81003 URINALYSIS AUTO W/O SCOPE: CPT | Performed by: UROLOGY

## 2021-04-29 PROCEDURE — 99212 OFFICE O/P EST SF 10 MIN: CPT | Performed by: UROLOGY

## 2021-04-29 PROCEDURE — 52000 CYSTOURETHROSCOPY: CPT | Performed by: UROLOGY

## 2021-04-29 NOTE — PROGRESS NOTES
41-year-old white male returns today for diagnostic cystoscopy for persistent dysuria. UA today: WNL  Cystoscopy  After obtaining informed consent and prepping the urethral meatus, a 16-Qatari flexible cystoscope was passed per urethra into the bladder. The urethra was evaluated on the way in and then again on the way out and was found to be normal.  The prostate was small and non-obstructing. The bladder was evaluated in its endoscopic entirety. There is no trabeculation with no cellules present. No diverticuli are present. There were no tumors, stones, ulcers or foreign bodies. There were no mucosal abnormalities. The ureteral orifices were seen and were normal.  The scope was removed. The patient tolerated the procedure and there were no complications. Estimated blood loss was minimal.  A dose of 500 mg ciprofloxacin was given for the procedure. Impression: normal cystoscopy  2. Dysuria of unknown etiology. I discussed avoiding acidic foods or beverages such as spicy food salsa, carbonated soft drinks, alcohol, fruit juices and coffee. I also encouraged him to drink 2 L of water daily. The Azo was not very helpful. See prn.

## 2021-04-29 NOTE — PROGRESS NOTES
10ml of 2% Lidocaine Urojet inserted into the penis and clamped for 5 minutes before cystoscopy procedure. 1 tablet of cipro 500 given prior to cystoscopy.

## 2021-05-06 ENCOUNTER — OFFICE VISIT (OUTPATIENT)
Dept: PHYSICAL MEDICINE AND REHAB | Age: 41
End: 2021-05-06
Payer: COMMERCIAL

## 2021-05-06 VITALS
SYSTOLIC BLOOD PRESSURE: 138 MMHG | BODY MASS INDEX: 36.26 KG/M2 | HEART RATE: 76 BPM | DIASTOLIC BLOOD PRESSURE: 102 MMHG | HEIGHT: 71 IN | WEIGHT: 259 LBS

## 2021-05-06 DIAGNOSIS — M79.18 MYOFASCIAL PAIN: ICD-10-CM

## 2021-05-06 DIAGNOSIS — G89.4 CHRONIC PAIN SYNDROME: ICD-10-CM

## 2021-05-06 DIAGNOSIS — M47.816 LUMBAR SPONDYLOSIS: ICD-10-CM

## 2021-05-06 DIAGNOSIS — M54.59 LUMBAR FACET JOINT PAIN: Primary | ICD-10-CM

## 2021-05-06 PROCEDURE — 99214 OFFICE O/P EST MOD 30 MIN: CPT | Performed by: ANESTHESIOLOGY

## 2021-05-06 RX ORDER — HYDROCODONE BITARTRATE AND ACETAMINOPHEN 5; 325 MG/1; MG/1
1 TABLET ORAL 2 TIMES DAILY PRN
Qty: 60 TABLET | Refills: 0 | Status: SHIPPED | OUTPATIENT
Start: 2021-05-06 | End: 2021-05-06

## 2021-05-06 RX ORDER — HYDROCODONE BITARTRATE AND ACETAMINOPHEN 5; 325 MG/1; MG/1
1 TABLET ORAL 2 TIMES DAILY PRN
Qty: 60 TABLET | Refills: 0 | Status: SHIPPED | OUTPATIENT
Start: 2021-05-06 | End: 2021-06-05

## 2021-05-06 NOTE — PATIENT INSTRUCTIONS
The following treatment recommendations and plan were discussed in detail with Leanne Tijerina. Imaging:   I have reviewed patients imaging of MRI L-spine (report) and results were discussed with patient today. Analgesics: We will continue his Norco 5 mg twice daily as needed while we are working on his lumbar RFA. This medication is only to bridge him to his lumbar radiofrequency ablation procedure. In addition, this medication is to help keep him more functional and continue to work. I explained the risks of using this medication long-term including risk of developing tolerance, physical dependence, and abuse potential of medication. I advised the patient on to take this medication when pain is severe (pain greater than 7/10). In addition, I advised the patient to keep this medication stored in a safe place. If the medication is stolen, the patient will not get refills on the medication. OARRS reviewed today and is appropriate. Pain contract agreement signed today. Patient is taking Acetaminophen. Patient informed that the maximum amount of acetaminophen taken on a regular basis should only be 4000 mg per day. Adjuvants:   Patient can continue baclofen 10 mg 3 times a day as needed. Interventions: In presence of lumbar axial back pain and with physical exam consistent for facetal pain and significant response to diagnostic and confirmatory lumbar medial branch blocks, thermal radiofrequency ablation at LEFT medial branches L4/L5 and L5/S1 was chosen. The risks and benefits were discussed in detail with the patient. Patient wants to proceed with the injection. Anticoagulation/NPO Recommendations:   Patient does not need to hold any medications prior to the procedure. Patient will need to be NPO x 8 hours prior to the procedure. We will start an IV prior to the procedure.     Multidisciplinary Pain Management:   In the presence of complex, chronic, and multi-factorial pain, the importance of a multidisciplinary approach to pain management in the patients management regimen was emphasized and discussed in great detail.    PHYSICAL THERAPY:  I would advise the patient to continue gentle range of motion stretching exercises twice a day    Referrals:  None    Prescriptions Written This Visit:   Norco 5 mg (#60, 0 refills)    Follow-up: For LEFT-sided lumbar RFA

## 2021-05-06 NOTE — PROGRESS NOTES
Chronic Pain/PM&R Clinic Note     Encounter Date: 5/6/21    Subjective:   Chief Complaint:   Chief Complaint   Patient presents with    Follow Up After Procedure    Medication Refill     Norco, Tizanidine       History of Present Illness:   Zhane Gonzales is a 45 y.o. male seen in the clinic on 01/20/21 for his history of chronic low back pain. He has a medical history of anxiety/depression and diverticulitis. He has been dealing with low back pain for the last 4 years after he fell off a roof onto a ladder. He describes his pain is primarily in the axial back with some radiation down into bilateral buttocks. He rates his pain a constant 7/10 aching, stabbing pain. His pain is worse when he is upright or doing any activities particularly working. He works as a  . His pain is improved with rest.  He denies any focal leg weakness, new paresthesias, saddle anesthesia, bowel/bladder incontinence. He states his pain is interfering with his everyday activities including work and sleep. Of note, he was previously seen Dr. Jannie Wheeler and underwent diagnostic lumbar medial branch blocks on 11/18/2020 in which he reports benefit for a couple hours. He was previously taking tramadol and Flexeril with no benefit. He underwent physical therapy within the last year with no benefit as well. Today, 5/6/2021, patient presents for planned follow-up after right-sided lumbar thermal radiofrequency ablation performed on 4/20/2021. He reports greater than 90% relief on the right side of his low back pain but continued left-sided back pain. He wants to proceed with a left-sided thermal radiofrequency ablation. He continues to work and feels like he has some good days and bad days. He states he is able to get through work little bit easier since the right-sided thermal radiofrequency ablation. He continues to take Norco as needed throughout the day for breakthrough pain.   He denies any side effects on this medication. History of Interventions:   Surgery: No previous back surgeries  Injections: Lumbar MBBs (11/18/20) - noted benefit  Diagnostic and confirmatory lumbar MBBs (2/23/21 and 3/23/21) - >80% relief and 100% relief respectively  Right sided lumbar RFA (4/20/21) - 90% relief    Current Treatment Medications:   Norco 5 mg twice daily as neededprescribed by me  Paxil - anxiety/depression   Wellbutrin - anxiety/depression  Baclofen 10 mg 3 times daily as needed    Historical Treatment Medications:   NSAIDs - unable to take due to diverticulitis  Flexeril - no benefit  Tramadol - no benefit  Gabapentin - unable to tolerate (worsening anxiety and RLS)  Lyrica - unable to tolerate (worsening anxiety and RLS)    Imaging:  MRI L-spine (7/14/20)        Past Medical History:   Diagnosis Date    Chronic back pain     Depression     GERD (gastroesophageal reflux disease)     History of high blood pressure     Hypertension        Past Surgical History:   Procedure Laterality Date    COLONOSCOPY  12/2020    WNL    PAIN MANAGEMENT PROCEDURE Bilateral 11/18/2020    L3, L4 medial branch and L5 dorsal rami injection, bilateral performed by Fransisco Mitchell MD at [facility] Bilateral 2/23/2021    medial branch blocks at bilateral L4/L5 and L5/S1 performed by Daniel Tineo DO at [facility] Bilateral 3/23/2021    confirmatory medial branch blocks at bilateral L4/L5 and L5/S1 performed by Daniel Tineo DO at [facility] [address] Right 4/20/2021    the option of thermal radiofrequency ablation at RIGHT medial branches at L4/L5 and L5/S1 performed by Daniel Tineo DO at [address] [facility]       History reviewed. No pertinent family history.     Social History     Socioeconomic History    Marital status:      Spouse name: Birdie Ahn Number of children: Not on file    Years of education: Not on file    Highest education level: Not on file   Occupational History    Not on file   Social Needs    Financial resource strain: Not on file    Food insecurity     Worry: Not on file     Inability: Not on file    Transportation needs     Medical: Not on file     Non-medical: Not on file   Tobacco Use    Smoking status: Current Every Day Smoker     Packs/day: 1.00     Types: Cigarettes    Smokeless tobacco: Never Used   Substance and Sexual Activity    Alcohol use: Never     Frequency: Never    Drug use: Never    Sexual activity: Not on file   Lifestyle    Physical activity     Days per week: Not on file     Minutes per session: Not on file    Stress: Not on file   Relationships    Social connections     Talks on phone: Not on file     Gets together: Not on file     Attends Confucianism service: Not on file     Active member of club or organization: Not on file     Attends meetings of clubs or organizations: Not on file     Relationship status: Not on file    Intimate partner violence     Fear of current or ex partner: Not on file     Emotionally abused: Not on file     Physically abused: Not on file     Forced sexual activity: Not on file   Other Topics Concern    Not on file   Social History Narrative    Not on file       Medications & Allergies:   Current Outpatient Medications   Medication Instructions    buPROPion (WELLBUTRIN XL) 150 mg, Oral, EVERY MORNING    HYDROcodone-acetaminophen (NORCO) 5-325 MG per tablet 1 tablet, Oral, 2 TIMES DAILY PRN    hydrOXYzine (VISTARIL) 50 MG capsule TAKE 1 CAPSULE BY MOUTH THREE TIMES DAILY AS NEEDED FOR ANXIETY    lisinopril (PRINIVIL;ZESTRIL) 10 mg, Oral, DAILY    pantoprazole (PROTONIX) 40 mg, Oral, 2 TIMES DAILY BEFORE MEALS    PARoxetine (PAXIL) 20 mg, Oral, DAILY    tiZANidine (ZANAFLEX) 4 MG tablet TAKE 1 TABLET BY MOUTH EVERY 8 HOURS AS NEEDED FOR MUSCLE TIGHTNESS/SPASMS       No Known Allergies    Review of Systems: pain as this has helped him stay more functional during work. He responded significantly to both diagnostic and confirmatory lumbar medial branch blocks. He has completed his right-sided thermal radiofrequency ablation targeting the facet joints of L4/L5 and L5/S1 with significant results and we will proceed with the left side thermal radiofrequency ablation. I have provided refills on his Norco to keep him functional and get through his workday. Plan: The following treatment recommendations and plan were discussed in detail with Lisandra Moreno. Imaging:   I have reviewed patients imaging of MRI L-spine (report) and results were discussed with patient today. Analgesics: We will continue his Norco 5 mg twice daily as needed while we are working on his lumbar RFA. This medication is only to bridge him to his lumbar radiofrequency ablation procedure. In addition, this medication is to help keep him more functional and continue to work. I explained the risks of using this medication long-term including risk of developing tolerance, physical dependence, and abuse potential of medication. I advised the patient on to take this medication when pain is severe (pain greater than 7/10). In addition, I advised the patient to keep this medication stored in a safe place. If the medication is stolen, the patient will not get refills on the medication. OARRS reviewed today and is appropriate. Pain contract agreement signed today. Patient is taking Acetaminophen. Patient informed that the maximum amount of acetaminophen taken on a regular basis should only be 4000 mg per day. Adjuvants:   Patient can continue baclofen 10 mg 3 times a day as needed. Interventions:    In presence of lumbar axial back pain and with physical exam consistent for facetal pain and significant response to diagnostic and confirmatory lumbar medial branch blocks, thermal radiofrequency ablation at LEFT medial branches L4/L5 and L5/S1 was chosen. The risks and benefits were discussed in detail with the patient. Patient wants to proceed with the injection. Anticoagulation/NPO Recommendations:   Patient does not need to hold any medications prior to the procedure. Patient will need to be NPO x 8 hours prior to the procedure. We will start an IV prior to the procedure. Multidisciplinary Pain Management:   In the presence of complex, chronic, and multi-factorial pain, the importance of a multidisciplinary approach to pain management in the patients management regimen was emphasized and discussed in great detail.    PHYSICAL THERAPY:  I would advise the patient to continue gentle range of motion stretching exercises twice a day    Referrals:  None    Prescriptions Written This Visit:   Norco 5 mg (#60, 0 refills)    Follow-up: For LEFT-sided lumbar RFA      Destin Braxton, DO  Interventional Pain Management/PM&R   [place]

## 2021-05-24 ENCOUNTER — TELEPHONE (OUTPATIENT)
Dept: PHYSICAL MEDICINE AND REHAB | Age: 41
End: 2021-05-24

## 2021-05-24 NOTE — H&P
Today, patient presents for planned thermal radiofrequency ablation at LEFT medial branches L4/L5 and L5/S1. This note is reflective of the patient's previous visit for evaluation. We will proceed with today's planned procedure. Since patient's last visit for evaluation, there have been no interval changes in medical history. Patient has no new numbness, weakness, or focal neurological deficit since evaluation. Patient has no contraindications to injection (no anticoagulation, recent antibiotic intake for active infections, allergies to latex / contrast dye / steroid medications), and has a  present. NPO necessity has been assessed and accepted based on procedure complexity. The risks and benefits of the procedure have been explained including but are not limited to infection, bleeding, paralysis, immediate post procedure weakness, and dizziness; the patient acknowledges understanding and desires to proceed with the procedure. Consented for same procedure. All other questions and concerns were addressed at bedside. See procedure note for full details. Vitals:    05/25/21 1016   BP:    Pulse: 84   Resp: 10   Temp:    SpO2: 95%         Follow up: 7/29/21     Post procedure Instructions: The patient was advised not to drive during the day of the procedure and not to engage in any significant decision making. The patient was also advised to be cautious with walking/activity for 24 hours following today's visit and asked not to engage in over-exertion. After this time, it is ok to resume pre-procedure level of activity. Patient advised to apply ice to site of injection in situations of pain and discomfort. Patient advised to not submerge site of injection during bath or pool activities for approximately 48 hours post-procedure. Patient attested to understanding post procedure directions / restrictions. All other questions and concerns addressed before patient discharge in ambulatory fashion.          Chronic Pain/PM&R Clinic Note     Encounter Date: 5/6/21    Subjective:   Chief Complaint:   No chief complaint on file. History of Present Illness:   Omar Preston is a 45 y.o. male seen in the clinic on 01/20/21 for his history of chronic low back pain. He has a medical history of anxiety/depression and diverticulitis. He has been dealing with low back pain for the last 4 years after he fell off a roof onto a ladder. He describes his pain is primarily in the axial back with some radiation down into bilateral buttocks. He rates his pain a constant 7/10 aching, stabbing pain. His pain is worse when he is upright or doing any activities particularly working. He works as a  . His pain is improved with rest.  He denies any focal leg weakness, new paresthesias, saddle anesthesia, bowel/bladder incontinence. He states his pain is interfering with his everyday activities including work and sleep. Of note, he was previously seen Dr. Marea Meckel and underwent diagnostic lumbar medial branch blocks on 11/18/2020 in which he reports benefit for a couple hours. He was previously taking tramadol and Flexeril with no benefit. He underwent physical therapy within the last year with no benefit as well. Today, 5/6/2021, patient presents for planned follow-up after right-sided lumbar thermal radiofrequency ablation performed on 4/20/2021. He reports greater than 90% relief on the right side of his low back pain but continued left-sided back pain. He wants to proceed with a left-sided thermal radiofrequency ablation. He continues to work and feels like he has some good days and bad days. He states he is able to get through work little bit easier since the right-sided thermal radiofrequency ablation. He continues to take Norco as needed throughout the day for breakthrough pain. He denies any side effects on this medication.         History of Interventions:   Surgery: No previous back surgeries  Injections: Lumbar MBBs (11/18/20) - noted benefit  Diagnostic and confirmatory lumbar MBBs (2/23/21 and 3/23/21) - >80% relief and 100% relief respectively  Right sided lumbar RFA (4/20/21) - 90% relief    Current Treatment Medications:   Norco 5 mg twice daily as needed-prescribed by me  Paxil - anxiety/depression   Wellbutrin - anxiety/depression  Baclofen 10 mg 3 times daily as needed    Historical Treatment Medications:   NSAIDs - unable to take due to diverticulitis  Flexeril - no benefit  Tramadol - no benefit  Gabapentin - unable to tolerate (worsening anxiety and RLS)  Lyrica - unable to tolerate (worsening anxiety and RLS)    Imaging:  MRI L-spine (7/14/20)        Past Medical History:   Diagnosis Date    Chronic back pain     Depression     GERD (gastroesophageal reflux disease)     History of high blood pressure     Hypertension        Past Surgical History:   Procedure Laterality Date    COLONOSCOPY  12/2020    WNL    PAIN MANAGEMENT PROCEDURE Bilateral 11/18/2020    L3, L4 medial branch and L5 dorsal rami injection, bilateral performed by Ela Tucker MD at [place] [id number] Bilateral 2/23/2021    medial branch blocks at bilateral L4/L5 and L5/S1 performed by Chela Rey DO at [facility] [address] Bilateral 3/23/2021    confirmatory medial branch blocks at bilateral L4/L5 and L5/S1 performed by Chela Rey DO at [facility] [address] Right 4/20/2021    the option of thermal radiofrequency ablation at RIGHT medial branches at L4/L5 and L5/S1 performed by Chela Rey DO at [address] [place]       No family history on file.     Social History     Socioeconomic History    Marital status:      Spouse name: Stanislaw Nieves Number of children: Not on file    Years of education: Not on file    Highest education level: Not on file   Occupational History    Not on for weight changes or fevers  Genitourinary: negative for bowel/bladder incontinence   Musculoskeletal: positive for low back pain  Neurological: negative for any leg weakness or numbness/tingling  Behavioral/Psych: admits to anxiety/depression   All other systems reviewed and are negative    Objective: There were no vitals filed for this visit. Constitutional: Pleasant, no acute distress   Head: Normocephalic, atraumatic   Eyes: Conjunctivae normal   Neck: Supple, symmetrical   Lungs: Normal respiratory effort, non-labored breathing   Cardiovascular: Limbs warm and well perfused   Abdomen: Non-protruded   Musculoskeletal: Muscle bulk symmetric, no atrophy, no gross deformities   · Lumbar Spine: Lumbar paraspinals tender bilaterally. SLR neg bilaterally. Positive facet loading bilaterally. Neurological: Cranial nerves II-XII grossly intact. · Gait - Slightly antalgic gait. Ambulates without assist device. · Motor: No focal motor deficit  Skin: No rashes or lesions visible in low back  Psychological: Cooperative, no exaggerated pain behaviors     Assessment:    Diagnosis Orders   1. Lumbar facet joint pain  CHG FLUOR NEEDLE/CATH SPINE/PARASPINAL DX/THER ADDON    MA RADIOFREQUENCY NEUROTOMY LUMBAR OR SACRAL, W IMAGE GUIDANCE, SINGLE    MA RADIOFREQ NEUROTOMY LUMBAR OR SACRAL, W IMAGE GUIDE,EA ADDL LEVEL   2. Chronic pain syndrome  HYDROcodone-acetaminophen (NORCO) 5-325 MG per tablet   3. Myofascial pain     4. Lumbar spondylosis           Isaak Block is a 45 y.o.male presenting to the pain clinic for evaluation of chronic low back pain. His clinical history and physical examination are consistent with lumbar facet mediated pain and associated myofascial pain. I have set him up for diagnostic lumbar medial branch blocks targeting the facet joints of L4/L5 and L5/S1 bilaterally.  In addition, I have continued his Norco 5 mg twice a day as needed for severe pain as this has helped him stay more functional during work. He responded significantly to both diagnostic and confirmatory lumbar medial branch blocks. He has completed his right-sided thermal radiofrequency ablation targeting the facet joints of L4/L5 and L5/S1 with significant results and we will proceed with the left side thermal radiofrequency ablation. I have provided refills on his Norco to keep him functional and get through his workday. Plan: The following treatment recommendations and plan were discussed in detail with Jose Sheffield. Imaging:   I have reviewed patients imaging of MRI L-spine (report) and results were discussed with patient today. Analgesics: We will continue his Norco 5 mg twice daily as needed while we are working on his lumbar RFA. This medication is only to bridge him to his lumbar radiofrequency ablation procedure. In addition, this medication is to help keep him more functional and continue to work. I explained the risks of using this medication long-term including risk of developing tolerance, physical dependence, and abuse potential of medication. I advised the patient on to take this medication when pain is severe (pain greater than 7/10). In addition, I advised the patient to keep this medication stored in a safe place. If the medication is stolen, the patient will not get refills on the medication. OARRS reviewed today and is appropriate. Pain contract agreement signed today. Patient is taking Acetaminophen. Patient informed that the maximum amount of acetaminophen taken on a regular basis should only be 4000 mg per day. Adjuvants:   Patient can continue baclofen 10 mg 3 times a day as needed. Interventions: In presence of lumbar axial back pain and with physical exam consistent for facetal pain and significant response to diagnostic and confirmatory lumbar medial branch blocks, thermal radiofrequency ablation at LEFT medial branches L4/L5 and L5/S1 was chosen.  The risks and benefits were discussed in detail with the patient. Patient wants to proceed with the injection. Anticoagulation/NPO Recommendations:   Patient does not need to hold any medications prior to the procedure. Patient will need to be NPO x 8 hours prior to the procedure. We will start an IV prior to the procedure. Multidisciplinary Pain Management:   In the presence of complex, chronic, and multi-factorial pain, the importance of a multidisciplinary approach to pain management in the patients management regimen was emphasized and discussed in great detail.    PHYSICAL THERAPY:  I would advise the patient to continue gentle range of motion stretching exercises twice a day    Referrals:  None    Prescriptions Written This Visit:   Norco 5 mg (#60, 0 refills)    Follow-up: For LEFT-sided lumbar RFA      Destin Braxton, DO  Interventional Pain Management/PM&R   [place]

## 2021-05-25 ENCOUNTER — HOSPITAL ENCOUNTER (OUTPATIENT)
Age: 41
Setting detail: OUTPATIENT SURGERY
Discharge: HOME | End: 2021-05-25
Attending: ANESTHESIOLOGY | Admitting: ANESTHESIOLOGY
Payer: COMMERCIAL

## 2021-05-25 ENCOUNTER — APPOINTMENT (OUTPATIENT)
Dept: GENERAL RADIOLOGY | Age: 41
End: 2021-05-25
Attending: ANESTHESIOLOGY
Payer: COMMERCIAL

## 2021-05-25 VITALS
DIASTOLIC BLOOD PRESSURE: 75 MMHG | HEART RATE: 82 BPM | BODY MASS INDEX: 36.36 KG/M2 | RESPIRATION RATE: 18 BRPM | TEMPERATURE: 97.2 F | HEIGHT: 70 IN | OXYGEN SATURATION: 97 % | SYSTOLIC BLOOD PRESSURE: 125 MMHG | WEIGHT: 254 LBS

## 2021-05-25 PROCEDURE — 2500000003 HC RX 250 WO HCPCS: Performed by: ANESTHESIOLOGY

## 2021-05-25 PROCEDURE — 3600000054 HC PAIN LEVEL 3 BASE: Performed by: ANESTHESIOLOGY

## 2021-05-25 PROCEDURE — 6370000000 HC RX 637 (ALT 250 FOR IP): Performed by: ANESTHESIOLOGY

## 2021-05-25 PROCEDURE — 7100000011 HC PHASE II RECOVERY - ADDTL 15 MIN: Performed by: ANESTHESIOLOGY

## 2021-05-25 PROCEDURE — 64635 DESTROY LUMB/SAC FACET JNT: CPT | Performed by: ANESTHESIOLOGY

## 2021-05-25 PROCEDURE — 99152 MOD SED SAME PHYS/QHP 5/>YRS: CPT | Performed by: ANESTHESIOLOGY

## 2021-05-25 PROCEDURE — 3209999900 FLUORO FOR SURGICAL PROCEDURES

## 2021-05-25 PROCEDURE — 3600000055 HC PAIN LEVEL 3 ADDL 15 MIN: Performed by: ANESTHESIOLOGY

## 2021-05-25 PROCEDURE — 64636 DESTROY L/S FACET JNT ADDL: CPT | Performed by: ANESTHESIOLOGY

## 2021-05-25 PROCEDURE — 7100000010 HC PHASE II RECOVERY - FIRST 15 MIN: Performed by: ANESTHESIOLOGY

## 2021-05-25 PROCEDURE — 6360000002 HC RX W HCPCS: Performed by: ANESTHESIOLOGY

## 2021-05-25 PROCEDURE — 2709999900 HC NON-CHARGEABLE SUPPLY: Performed by: ANESTHESIOLOGY

## 2021-05-25 RX ORDER — LIDOCAINE HYDROCHLORIDE 10 MG/ML
INJECTION, SOLUTION EPIDURAL; INFILTRATION; INTRACAUDAL; PERINEURAL PRN
Status: DISCONTINUED | OUTPATIENT
Start: 2021-05-25 | End: 2021-05-25 | Stop reason: ALTCHOICE

## 2021-05-25 RX ORDER — MIDAZOLAM HYDROCHLORIDE 1 MG/ML
INJECTION INTRAMUSCULAR; INTRAVENOUS PRN
Status: DISCONTINUED | OUTPATIENT
Start: 2021-05-25 | End: 2021-05-25 | Stop reason: ALTCHOICE

## 2021-05-25 RX ORDER — LIDOCAINE HYDROCHLORIDE 20 MG/ML
INJECTION, SOLUTION EPIDURAL; INFILTRATION; INTRACAUDAL; PERINEURAL PRN
Status: DISCONTINUED | OUTPATIENT
Start: 2021-05-25 | End: 2021-05-25 | Stop reason: ALTCHOICE

## 2021-05-25 RX ORDER — OXYCODONE HYDROCHLORIDE AND ACETAMINOPHEN 5; 325 MG/1; MG/1
1 TABLET ORAL 2 TIMES DAILY PRN
Qty: 14 TABLET | Refills: 0 | Status: SHIPPED | OUTPATIENT
Start: 2021-05-25 | End: 2021-05-31 | Stop reason: SDUPTHER

## 2021-05-25 RX ORDER — FENTANYL CITRATE 50 UG/ML
INJECTION, SOLUTION INTRAMUSCULAR; INTRAVENOUS PRN
Status: DISCONTINUED | OUTPATIENT
Start: 2021-05-25 | End: 2021-05-25 | Stop reason: ALTCHOICE

## 2021-05-25 RX ORDER — OXYCODONE HYDROCHLORIDE AND ACETAMINOPHEN 5; 325 MG/1; MG/1
1 TABLET ORAL
Status: COMPLETED | OUTPATIENT
Start: 2021-05-25 | End: 2021-05-25

## 2021-05-25 RX ORDER — OXYCODONE HYDROCHLORIDE AND ACETAMINOPHEN 5; 325 MG/1; MG/1
TABLET ORAL
Status: DISCONTINUED
Start: 2021-05-25 | End: 2021-05-25 | Stop reason: HOSPADM

## 2021-05-25 RX ADMIN — OXYCODONE HYDROCHLORIDE AND ACETAMINOPHEN 1 TABLET: 5; 325 TABLET ORAL at 10:36

## 2021-05-25 ASSESSMENT — PAIN DESCRIPTION - DESCRIPTORS: DESCRIPTORS: STABBING

## 2021-05-25 ASSESSMENT — PAIN SCALES - GENERAL: PAINLEVEL_OUTOF10: 8

## 2021-05-25 ASSESSMENT — PAIN DESCRIPTION - LOCATION: LOCATION: BACK

## 2021-05-25 ASSESSMENT — PAIN - FUNCTIONAL ASSESSMENT: PAIN_FUNCTIONAL_ASSESSMENT: 0-10

## 2021-05-25 NOTE — PROGRESS NOTES
1017 Patient in pain, wants to rest, pain meds offered. Refused, wants \"to give it time\"  1 Dr Deshawn Elizabeth in to see. Patient agreed to take Percocet. Ice pack given. 46 Medicated for pain. Kyle. 1110 Dressing for discharge, states \"pain is better, 5/10 on scale. Fresh ice pack given. Ride called, states he is \"on my way from work. Should be there in 10-15 minutes\" Patient notified. 1135 Patient called ride, not here. Water given.   1151 Ride here, to car ambulatory, states \"feeling better, thanks\"

## 2021-05-25 NOTE — PROCEDURES
Pre-operative Diagnosis: Lumbar facet pain     Post-operative Diagnosis: Lumbar facet pain     Procedure: LEFT lumbar thermal radiofrequency ablation targeting facet joints L4/L5 and L5/S1     Procedure Description:  After consent was obtained, the patient was placed in the prone position having pressure points appropriately padded. The lower back was prepped with chloraprep and draped in a sterile fashion.  0.5 cc of 1 % lidocaine was used for local anesthesia of the skin and superficial subcutaneous tissues.  Three 22-gauge 100mm SMK cannula with a 10-mm active tip was advanced under fluoroscopy in an AP view with caudad angulation on to junction of the left superior articular process and the transverse process of the L4 and L5 vertebra and at the sacral ala. There were no paresthesias, heme or CSF obtained. Needle placement was confirmed using AP and oblique views.      Sensory and motor stimulation at 50Hz and 2Hz and impedance measurements were carried out having reached threshold on the LEFT side at:     LEFT  L4: 0.2V/3V/180 Ohms  L5: 0.2V/3V/170 Ohms  SA: 0.4V/3V/180 Ohms     Then, 1cc of 2 % Lidocaine was injected at the site. Temperature was then raised to 80 degrees centigrade for 90 seconds with a 15 second temperature ramp. No pain was reported during the lesioning. The needles were then withdrawn without complications.  The patient tolerated the procedure well and was transported to the recovery room where he was observed for 15 minutes to then be discharged in ambulatory fashion.     Procedural Complications: None     IV sedation was used during the procedure:  - Moderate intravenous conscious sedation was supervised by Dr. Radha Lynne  - The patient was independently monitored by a Registered Nurse assigned to the procedure room  - Monitoring included automated blood pressure, continuous EKG, and continuous pulse oximetry  - The detailed conscious record is permanently stored in the [address] [place] [address]. System  - The following is the conscious sedation record:  Start Time: 10:03  End Time : 10:23  Duration: 20 minutes   Medications Administered: 1 mg Versed, 100 mcg Fentanyl         Destin Braxton DO  Interventional Pain Management/PM&R   [facility]

## 2021-05-31 DIAGNOSIS — G89.4 CHRONIC PAIN SYNDROME: ICD-10-CM

## 2021-06-01 RX ORDER — OXYCODONE HYDROCHLORIDE AND ACETAMINOPHEN 5; 325 MG/1; MG/1
1 TABLET ORAL 2 TIMES DAILY PRN
Qty: 60 TABLET | Refills: 0 | Status: SHIPPED | OUTPATIENT
Start: 2021-06-01 | End: 2021-06-29 | Stop reason: SDUPTHER

## 2021-06-01 NOTE — TELEPHONE ENCOUNTER
OARRS reviewed. UDS: Inconsistent Mitragynine. Last seen: 5/6/2021.  Follow-up:   Future Appointments   Date Time Provider Nick Naranjo   7/29/2021  8:00 AM DO FAUSTO Rivera [facility] [place]

## 2021-06-29 DIAGNOSIS — G89.4 CHRONIC PAIN SYNDROME: ICD-10-CM

## 2021-06-29 NOTE — TELEPHONE ENCOUNTER
OARRS reviewed. UDS: Inconsistent Mitragynine. Last seen: 5/6/2021.  Follow-up:   Future Appointments   Date Time Provider Nick Naranjo   7/29/2021  8:00 AM DO FAUSTO De La Fuente [facility] [place]

## 2021-07-01 DIAGNOSIS — G89.4 CHRONIC PAIN SYNDROME: ICD-10-CM

## 2021-07-01 RX ORDER — OXYCODONE HYDROCHLORIDE AND ACETAMINOPHEN 5; 325 MG/1; MG/1
1 TABLET ORAL 2 TIMES DAILY PRN
Qty: 60 TABLET | Refills: 0 | OUTPATIENT
Start: 2021-07-01 | End: 2021-07-31

## 2021-07-01 RX ORDER — OXYCODONE HYDROCHLORIDE AND ACETAMINOPHEN 5; 325 MG/1; MG/1
1 TABLET ORAL 2 TIMES DAILY PRN
Qty: 60 TABLET | Refills: 0 | Status: SHIPPED | OUTPATIENT
Start: 2021-07-01 | End: 2021-07-29 | Stop reason: SDUPTHER

## 2021-07-01 NOTE — TELEPHONE ENCOUNTER
OARRS reviewed. UDS:no screen. Last seen: 5/6/2021.  Follow-up:   Future Appointments   Date Time Provider Nick Naranjo   7/29/2021  8:00 AM Derik Sherryle Blue, DO N [facility] [place]

## 2021-07-12 RX ORDER — TIZANIDINE 4 MG/1
TABLET ORAL
Qty: 90 TABLET | Refills: 2 | Status: SHIPPED | OUTPATIENT
Start: 2021-07-12 | End: 2021-10-04

## 2021-07-29 ENCOUNTER — OFFICE VISIT (OUTPATIENT)
Dept: PHYSICAL MEDICINE AND REHAB | Age: 41
End: 2021-07-29
Payer: COMMERCIAL

## 2021-07-29 VITALS
WEIGHT: 254 LBS | BODY MASS INDEX: 36.36 KG/M2 | HEIGHT: 70 IN | SYSTOLIC BLOOD PRESSURE: 124 MMHG | DIASTOLIC BLOOD PRESSURE: 74 MMHG

## 2021-07-29 DIAGNOSIS — G89.4 CHRONIC PAIN SYNDROME: ICD-10-CM

## 2021-07-29 PROCEDURE — 99214 OFFICE O/P EST MOD 30 MIN: CPT | Performed by: ANESTHESIOLOGY

## 2021-07-29 RX ORDER — OXYCODONE HYDROCHLORIDE AND ACETAMINOPHEN 5; 325 MG/1; MG/1
1 TABLET ORAL 2 TIMES DAILY PRN
Qty: 60 TABLET | Refills: 0 | Status: SHIPPED | OUTPATIENT
Start: 2021-07-31 | End: 2021-08-30 | Stop reason: SDUPTHER

## 2021-07-29 NOTE — PROGRESS NOTES
any new symptoms of new radicular leg pain, new leg paresthesias, saddle anesthesia, bowel/bladder incontinence.       History of Interventions:   Surgery: No previous back surgeries  Injections: Lumbar MBBs (11/18/20) - noted benefit  Diagnostic and confirmatory lumbar MBBs (2/23/21 and 3/23/21) - >80% relief and 100% relief respectively  Lumbar RFA (4/20/2021, 5/25/2021)significant relief    Current Treatment Medications:   Percocet 5 mg twice daily as neededprescribed by me  Paxil - anxiety/depression   Wellbutrin - anxiety/depression  Baclofen 10 mg 3 times daily as needed    Historical Treatment Medications:   NSAIDs - unable to take due to diverticulitis  Flexeril - no benefit  Tramadol - no benefit  Gabapentin - unable to tolerate (worsening anxiety and RLS)  Lyrica - unable to tolerate (worsening anxiety and RLS)  Norcoineffective    Imaging:  MRI L-spine (7/14/20)        Past Medical History:   Diagnosis Date    Chronic back pain     Depression     GERD (gastroesophageal reflux disease)     History of high blood pressure     Hypertension        Past Surgical History:   Procedure Laterality Date    COLONOSCOPY  12/2020    WNL    PAIN MANAGEMENT PROCEDURE Bilateral 11/18/2020    L3, L4 medial branch and L5 dorsal rami injection, bilateral performed by Kyra Whitten MD at [place] [id number] Bilateral 2/23/2021    medial branch blocks at bilateral L4/L5 and L5/S1 performed by Marium Matthews DO at [place] [address] Bilateral 3/23/2021    confirmatory medial branch blocks at bilateral L4/L5 and L5/S1 performed by Marium Matthews DO at [place] [id number] Right 4/20/2021    the option of thermal radiofrequency ablation at RIGHT medial branches at L4/L5 and L5/S1 performed by Marium Matthews DO at [place] [address] Left 5/25/2021    thermal radiofrequency ablation at LEFT medial branches L4/L5 and L5/S1 [facility] [address] [address]       No family history on file. Social History     Socioeconomic History    Marital status:      Spouse name: Cholo Warren Number of children: Not on file    Years of education: Not on file    Highest education level: Not on file   Occupational History    Not on file   Tobacco Use    Smoking status: Current Every Day Smoker     Packs/day: 1.00     Types: Cigarettes    Smokeless tobacco: Never Used   Vaping Use    Vaping Use: Never used   Substance and Sexual Activity    Alcohol use: Never    Drug use: Never    Sexual activity: Not on file   Other Topics Concern    Not on file   Social History Narrative    Not on file     Social Determinants of Health     Financial Resource Strain:     Difficulty of Paying Living Expenses:    Food Insecurity:     Worried About Running Out of Food in the Last Year:     [address] in the Last Year:    Transportation Needs:     Lack of Transportation (Medical):      Lack of Transportation (Non-Medical):    Physical Activity:     Days of Exercise per Week:     Minutes of Exercise per Session:    Stress:     Feeling of Stress :    Social Connections:     Frequency of Communication with Friends and Family:     Frequency of Social Gatherings with Friends and Family:     Attends Zoroastrianism Services:     Active Member of Clubs or Organizations:     Attends Club or Organization Meetings:     Marital Status:    Intimate Partner Violence:     Fear of Current or Ex-Partner:     Emotionally Abused:     Physically Abused:     Sexually Abused:        Medications & Allergies:   Current Outpatient Medications   Medication Instructions    buPROPion (WELLBUTRIN XL) 150 mg, Oral, EVERY MORNING    hydrOXYzine (VISTARIL) 50 MG capsule TAKE 1 CAPSULE BY MOUTH THREE TIMES DAILY AS NEEDED FOR ANXIETY    lisinopril (PRINIVIL;ZESTRIL) 10 mg, Oral, DAILY    [START ON 7/31/2021] oxyCODONE-acetaminophen (PERCOCET) 5-325 MG per tablet 1 tablet, Oral, 2 TIMES DAILY PRN    pantoprazole (PROTONIX) 40 mg, Oral, 2 TIMES DAILY BEFORE MEALS    PARoxetine (PAXIL) 20 mg, Oral, DAILY    tiZANidine (ZANAFLEX) 4 MG tablet TAKE 1 TABLET BY MOUTH EVERY 8 HOURS AS NEEDED FOR MUSCLE TIGHTNESS/SPASMS       No Known Allergies    Review of Systems:   Constitutional: negative for weight changes or fevers  Genitourinary: negative for bowel/bladder incontinence   Musculoskeletal: positive for low back pain  Neurological: negative for any leg weakness or numbness/tingling  Behavioral/Psych:  Positive for anxiety/depression   All other systems reviewed and are negative    Objective:     Vitals:    07/29/21 0801   BP: 124/74       Constitutional: Pleasant, no acute distress   Head: Normocephalic, atraumatic   Eyes: Conjunctivae normal   Neck: Supple, symmetrical   Lungs: Normal respiratory effort, non-labored breathing   Cardiovascular: Limbs warm and well perfused   Abdomen: Non-protruded   Musculoskeletal: Muscle bulk symmetric, no atrophy, no gross deformities   · Lumbar Spine: Lumbar paraspinals tender bilaterally. SLR neg bilaterally. Positive facet loading bilaterally. Neurological: Cranial nerves II-XII grossly intact. · Gait - Slightly antalgic gait. Ambulates without assist device. · Motor: No focal motor deficit  Skin: No rashes or lesions visible in low back  Psychological: Cooperative, no exaggerated pain behaviors     Assessment:    Diagnosis Orders   1. Chronic pain syndrome  oxyCODONE-acetaminophen (PERCOCET) 5-325 MG per tablet         Merlene Reyes is a 37 y.o.male presenting to the pain clinic for evaluation of chronic low back pain. His clinical history and physical examination are consistent with lumbar facet mediated pain and associated myofascial pain.   I have set him up for diagnostic lumbar medial branch blocks targeting the facet joints of L4/L5 and L5/S1 bilaterally. He has completed his lumbar radiofrequency ablation with significant results. He does continue to take his Percocet as needed for breakthrough pain with improvement in his overall function. He is continued his weight loss efforts and is pursuing liposuction. We discussed more low back stretching that he should do over the next 12 weeks. At follow-up in 3 months we will incorporate a posterior chain core strengthening program.    Plan: The following treatment recommendations and plan were discussed in detail with Jeff Collazo. Imaging:   I have reviewed patients imaging of MRI L-spine (report) and results were discussed with patient today. Analgesics: For continued chronic pain, we will continue Percocet 5 mg twice daily as needed for severe pain (pain greater than 7/10). In addition, this medication is to help keep him more functional and continue to work. I explained the risks of using this medication long-term including risk of developing tolerance, physical dependence, and abuse potential of medication. I advised the patient on to take this medication when pain is severe (pain greater than 7/10). In addition, I advised the patient to keep this medication stored in a safe place. If the medication is stolen, the patient will not get refills on the medication. OARRS reviewed today and is appropriate. Pain contract agreement signed today. Patient is taking Acetaminophen. Patient informed that the maximum amount of acetaminophen taken on a regular basis should only be 4000 mg per day. Adjuvants:   Patient can continue baclofen 10 mg 3 times a day as needed. Interventions:   No interventions pursued at this visit. Anticoagulation/NPO Recommendations:   No interventions pursued at this visit.     Multidisciplinary Pain Management:   In the presence of complex, chronic, and multi-factorial pain, the importance of a multidisciplinary approach to pain management in the patients management regimen was emphasized and discussed in great detail.    PHYSICAL THERAPY:  I would advise the patient to continue gentle range of motion stretching exercises twice a day  -Patient provided with new lumbar stretches he should perform with his current low back stretches daily which include child's pose and cobra position    Referrals:  None    Prescriptions Written This Visit:   Percocet 5 mg (#60, 0 refills)    Follow-up: 12 weeks      Louis Done, DO  Interventional Pain Management/PM&R   [place]

## 2021-08-30 DIAGNOSIS — G89.4 CHRONIC PAIN SYNDROME: ICD-10-CM

## 2021-08-30 RX ORDER — OXYCODONE HYDROCHLORIDE AND ACETAMINOPHEN 5; 325 MG/1; MG/1
1 TABLET ORAL 2 TIMES DAILY PRN
Qty: 60 TABLET | Refills: 0 | Status: SHIPPED | OUTPATIENT
Start: 2021-08-30 | End: 2021-09-29 | Stop reason: SDUPTHER

## 2021-08-30 NOTE — TELEPHONE ENCOUNTER
OARRS reviewed. UDS:no screen. Last seen: 7/29/2021.  Follow-up:   Future Appointments   Date Time Provider Nick Naranjo   10/21/2021  8:00 AM DO FAUSTO Ford [facility] [place] [place] [facility]

## 2021-09-01 RX ORDER — BACLOFEN 10 MG/1
10 TABLET ORAL 3 TIMES DAILY PRN
Qty: 90 TABLET | Refills: 2 | Status: SHIPPED | OUTPATIENT
Start: 2021-09-01 | End: 2021-10-01

## 2021-09-28 ENCOUNTER — OFFICE VISIT (OUTPATIENT)
Dept: FAMILY MEDICINE CLINIC | Age: 41
End: 2021-09-28
Payer: COMMERCIAL

## 2021-09-28 VITALS
DIASTOLIC BLOOD PRESSURE: 76 MMHG | WEIGHT: 254.2 LBS | BODY MASS INDEX: 36.47 KG/M2 | RESPIRATION RATE: 16 BRPM | SYSTOLIC BLOOD PRESSURE: 124 MMHG | HEART RATE: 92 BPM | TEMPERATURE: 98.2 F

## 2021-09-28 DIAGNOSIS — N52.2 DRUG-INDUCED ERECTILE DYSFUNCTION: Primary | ICD-10-CM

## 2021-09-28 DIAGNOSIS — F41.9 ANXIETY: ICD-10-CM

## 2021-09-28 DIAGNOSIS — Z11.52 ENCOUNTER FOR SCREENING FOR COVID-19: ICD-10-CM

## 2021-09-28 DIAGNOSIS — F32.A DEPRESSION, UNSPECIFIED DEPRESSION TYPE: ICD-10-CM

## 2021-09-28 PROCEDURE — 99213 OFFICE O/P EST LOW 20 MIN: CPT | Performed by: NURSE PRACTITIONER

## 2021-09-28 RX ORDER — SILDENAFIL 50 MG/1
50 TABLET, FILM COATED ORAL DAILY PRN
Qty: 30 TABLET | Refills: 0 | Status: SHIPPED | OUTPATIENT
Start: 2021-09-28 | End: 2022-06-29

## 2021-09-28 SDOH — ECONOMIC STABILITY: FOOD INSECURITY: WITHIN THE PAST 12 MONTHS, YOU WORRIED THAT YOUR FOOD WOULD RUN OUT BEFORE YOU GOT MONEY TO BUY MORE.: PATIENT DECLINED

## 2021-09-28 SDOH — ECONOMIC STABILITY: FOOD INSECURITY: WITHIN THE PAST 12 MONTHS, THE FOOD YOU BOUGHT JUST DIDN'T LAST AND YOU DIDN'T HAVE MONEY TO GET MORE.: PATIENT DECLINED

## 2021-09-28 ASSESSMENT — SOCIAL DETERMINANTS OF HEALTH (SDOH): HOW HARD IS IT FOR YOU TO PAY FOR THE VERY BASICS LIKE FOOD, HOUSING, MEDICAL CARE, AND HEATING?: PATIENT DECLINED

## 2021-09-28 ASSESSMENT — ENCOUNTER SYMPTOMS
CHEST TIGHTNESS: 0
EYES NEGATIVE: 1
ABDOMINAL PAIN: 0
SHORTNESS OF BREATH: 0
BLOOD IN STOOL: 0

## 2021-09-28 NOTE — PROGRESS NOTES
Chief Complaint   Patient presents with    Other         SUBJECTIVE     Marthamarilyn Daen is a 37 y.o.male      Pt reports difficulty maintaining an erection since starting Paxil. He reports symptoms improved some once he was started on Wellbutrin but have not resolved. He does feel like his anxiety and depression are being well managed on these medications. Patient has no history of low testosterone in the past.  He does have pain management for his back and reports that is going well. He denies any cardiac history besides high blood pressure. Home BP has been doing well. He has been taking lisinopril around lunch time. Denies chest pain, shortness of breath, palpitations. Patient is requesting a Covid screening test for work. Reports that they help to have this completed by the beginning of October. Review of Systems   Constitutional: Negative for chills, fatigue, fever and unexpected weight change. HENT: Negative. Eyes: Negative. Respiratory: Negative for chest tightness and shortness of breath. Cardiovascular: Negative for chest pain, palpitations and leg swelling. Gastrointestinal: Negative for abdominal pain and blood in stool. Genitourinary: Negative for dysuria. Difficulty maintaining an erection   Musculoskeletal: Negative for joint swelling. Skin: Negative for rash. Neurological: Negative for dizziness. Psychiatric/Behavioral: Negative. All other systems reviewed and are negative. OBJECTIVE     /76 (Site: Left Upper Arm, Position: Sitting)   Pulse 92   Temp 98.2 °F (36.8 °C) (Oral)   Resp 16   Wt 254 lb 3.2 oz (115.3 kg)   BMI 36.47 kg/m²     Physical Exam  Vitals and nursing note reviewed. Constitutional:       Appearance: He is well-developed. HENT:      Head: Normocephalic and atraumatic.       Right Ear: External ear normal.      Left Ear: External ear normal.      Nose: Nose normal.   Eyes:      Conjunctiva/sclera: Conjunctivae normal. Pupils: Pupils are equal, round, and reactive to light. Cardiovascular:      Rate and Rhythm: Normal rate and regular rhythm. Pulmonary:      Effort: Pulmonary effort is normal.      Breath sounds: Normal breath sounds. Abdominal:      General: Bowel sounds are normal.      Palpations: Abdomen is soft. Musculoskeletal:         General: Normal range of motion. Cervical back: Normal range of motion and neck supple. Skin:     General: Skin is warm and dry. Neurological:      Mental Status: He is alert and oriented to person, place, and time. Deep Tendon Reflexes: Reflexes are normal and symmetric. Psychiatric:         Behavior: Behavior normal.         Thought Content: Thought content normal.         Judgment: Judgment normal.           No results found for this visit on 09/28/21. ASSESSMENT       Diagnosis Orders   1. Drug-induced erectile dysfunction     2. Encounter for screening for COVID-19  COVID-19   3. Anxiety     4. Depression, unspecified depression type         PLAN     Requested Prescriptions     Signed Prescriptions Disp Refills    sildenafil (VIAGRA) 50 MG tablet 30 tablet 0     Sig: Take 1 tablet by mouth daily as needed for Erectile Dysfunction       Orders Placed This Encounter   Procedures    COVID-19     Standing Status:   Future     Standing Expiration Date:   9/28/2022     Scheduling Instructions:      1) Due to current limited availability of the COVID-19 test, tests will be prioritized based on responses to questions above. Testing may be delayed due to volume. 2) Print and instruct patient to adhere to [facility] home isolation program. (Link Above)              3) Set up or refer patient for a monitoring program.              4) Have patient sign up for and leverage MyChart (if not previously done). Order Specific Question:   Is this test for diagnosis or screening?      Answer:   Screening     Order Specific Question:   Symptomatic for COVID-19 as defined by CDC? Answer:   No     Order Specific Question:   Date of Symptom Onset     Answer:   N/A     Order Specific Question:   Hospitalized for COVID-19? Answer:   No     Order Specific Question:   Admitted to ICU for COVID-19? Answer:   No     Order Specific Question:   Employed in healthcare setting? Answer:   No     Order Specific Question:   Resident in a congregate (group) care setting? Answer:   No     Order Specific Question:   Pregnant: Answer:   No     Order Specific Question:   Previously tested for COVID-19? Answer:    Yes       After discussion with patient, will start Viagra 50 mg daily as needed  Patient to notify office of any side effects  Covid testing ordered  Covid vaccine discussed  Follow-up after the first of the year if doing well or sooner as needed        Electronically signed by MATY Dawn CNP on 9/28/2021 at 12:10 PM

## 2021-09-28 NOTE — PATIENT INSTRUCTIONS
Patient Education        sildenafil (oral)  Pronunciation:  kinjal DEN vicki natividad  Brand:  Revatio, Viagra  What is the most important information I should know about oral sildenafil? Some medicines can cause unwanted or dangerous effects when used with sildenafil. Tell your doctor about all your current medicines, especially riociguat (Adempas). Do not take sildenafil if you are also using a nitrate drug for chest pain or heart problems, including nitroglycerin, isosorbide dinitrate, isosorbide mononitrate, and some recreational drugs such as \"poppers\". Taking sildenafil with a nitrate medicine can cause a sudden and serious decrease in blood pressure. Contact your doctor or seek emergency medical attention if your erection is painful or lasts longer than 4 hours. A prolonged erection (priapism) can damage the penis. Stop using sildenafil and get emergency medical help if you have sudden vision loss. What is sildenafil? Sildenafil relaxes muscles of the blood vessels and increases blood flow to particular areas of the body. Sildenafil under the name Viagra is used to treat erectile dysfunction (impotence) in men. Another brand of sildenafil is Revatio, which is used to treat pulmonary arterial hypertension and improve exercise capacity in men and women. Do not take Viagra while also taking Revatio, unless your doctor tells you to. Sildenafil may also be used for purposes not listed in this medication guide. What should I discuss with my healthcare provider before taking oral sildenafil? You should not use sildenafil if you are allergic to it, or:  · if you take other medicines to treat pulmonary arterial hypertension, such as riociguat (Adempas). Do not take sildenafil if you are also using a nitrate drug for chest pain or heart problems. This includes nitroglycerin, isosorbide dinitrate, and isosorbide mononitrate.  Nitrates are also found in some recreational drugs such as amyl nitrate or nitrite only when needed, 30 minutes to 1 hour before sexual activity. You may take it up to 4 hours before sexual activity. Do not take Viagra more than once per day. Shake the oral suspension (liquid) before you measure a dose. Use the dosing syringe provided, or use a medicine dose-measuring device (not a kitchen spoon). Viagra  can help you have an erection when sexual stimulation occurs. An erection will not occur just by taking a pill. Follow your doctor's instructions. During sexual activity, if you become dizzy or nauseated, or have pain, numbness, or tingling in your chest, arms, neck, or jaw, stop and call your doctor right away. You could be having a serious side effect of sildenafil. Store at room temperature away from moisture and heat. What happens if I miss a dose? Since Viagra  is used as needed, you are not likely to miss a dose. If you miss a dose of Revatio, take the medicine as soon as you can, but skip the missed dose if it is almost time for your next dose. Do not take two doses at one time. What happens if I overdose? Seek emergency medical attention or call the Poison Help line at 1-614.792.4201. What should I avoid while taking oral sildenafil? Drinking alcohol with this medicine can cause side effects. Grapefruit may interact with sildenafil and lead to unwanted side effects. Avoid the use of grapefruit products. Avoid using any other medicines to treat impotence, such as alprostadil or yohimbine, without first talking to your doctor. What are the possible side effects of oral sildenafil? Get emergency medical help if you have signs of an allergic reaction: hives; difficulty breathing; swelling of your face, lips, tongue, or throat.   Stop taking sildenafil and get emergency medical help if you have:   · heart attack symptoms --chest pain or pressure, pain spreading to your jaw or shoulder, nausea, sweating;  · vision changes or sudden vision loss; or  · erection is painful or lasts longer than 4 hours (prolonged erection can damage the penis). Call your doctor at once if you have:  · severe shortness of breath, cough with foamy mucus;  · ringing in your ears, or sudden hearing loss;  · irregular heartbeat;  · swelling in your hands, ankles, or feet;  · a seizure; or (convulsions); or  · a light-headed feeling, like you might pass out. Common side effects may include:  · flushing (warmth, redness, or tingly feeling);  · headache, dizziness;  · abnormal vision (blurred vision, changes in color vision)  · runny or stuffy nose, nosebleeds;  · sleep problems (insomnia);  · muscle pain, back pain; or  · upset stomach. This is not a complete list of side effects and others may occur. Call your doctor for medical advice about side effects. You may report side effects to FDA at 6-198-FDA-3994. What other drugs will affect oral sildenafil? Do not take sildenafil with similar medications such as avanafil Altaf Gordy), tadalafil (Cialis) or vardenafil (Levitra). Tell your doctor about all other medications you use for erectile dysfunction. Tell your doctor about all your other medicines, especially:  · drugs to treat high blood pressure or a prostate disorder;  · antifungal medicine --ketoconazole or itraconazole; or  · medicine to treat HIV/AIDS --ritonavir and others. This list is not complete. Other drugs may affect sildenafil, including prescription and over-the-counter medicines, vitamins, and herbal products. Not all possible drug interactions are listed here. Where can I get more information? Your pharmacist can provide more information about sildenafil. Remember, keep this and all other medicines out of the reach of children, never share your medicines with others, and use this medication only for the indication prescribed.    Every effort has been made to ensure that the information provided by [address] is accurate, up-to-date, and complete, but no guarantee is made to that effect. Drug information contained herein may be time sensitive. [employer] information has been compiled for use by healthcare practitioners and consumers in the [place] and therefore [employer] does not warrant that uses outside of the [place] are appropriate, unless specifically indicated otherwise. [facility]'s drug information does not endorse drugs, diagnose patients or recommend therapy. [facility] drug information is an informational resource designed to assist licensed healthcare practitioners in caring for their patients and/or to serve consumers viewing this service as a supplement to, and not a substitute for, the expertise, skill, knowledge and judgment of healthcare practitioners. The absence of a warning for a given drug or drug combination in no way should be construed to indicate that the drug or drug combination is safe, effective or appropriate for any given patient. [facility] does not assume any responsibility for any aspect of healthcare administered with the aid of information [facility] provides. The information contained herein is not intended to cover all possible uses, directions, precautions, warnings, drug interactions, allergic reactions, or adverse effects. If you have questions about the drugs you are taking, check with your doctor, nurse or pharmacist.  Copyright 5111-5717 [address]: 13.01. Revision date: 12/3/2018. Care instructions adapted under license by [facility] ([facility]). If you have questions about a medical condition or this instruction, always ask your healthcare professional. [place] [address] any warranty or liability for your use of this information.

## 2021-09-29 DIAGNOSIS — G89.4 CHRONIC PAIN SYNDROME: ICD-10-CM

## 2021-09-29 RX ORDER — OXYCODONE HYDROCHLORIDE AND ACETAMINOPHEN 5; 325 MG/1; MG/1
1 TABLET ORAL 2 TIMES DAILY PRN
Qty: 60 TABLET | Refills: 0 | Status: SHIPPED | OUTPATIENT
Start: 2021-09-29 | End: 2021-10-22 | Stop reason: SDUPTHER

## 2021-09-29 NOTE — TELEPHONE ENCOUNTER
OARRS reviewed. UDS:no screen. Last seen: 7/29/2021.  Follow-up:   Future Appointments   Date Time Provider Nick Naranjo   10/21/2021  8:00 AM DO FAUSTO Faith [facility] [place] [facility]

## 2021-10-04 RX ORDER — TIZANIDINE 4 MG/1
TABLET ORAL
Qty: 90 TABLET | Refills: 2 | Status: SHIPPED | OUTPATIENT
Start: 2021-10-04 | End: 2021-12-30

## 2021-10-12 RX ORDER — HYDROXYZINE PAMOATE 50 MG/1
CAPSULE ORAL
Qty: 90 CAPSULE | Refills: 1 | Status: SHIPPED | OUTPATIENT
Start: 2021-10-12 | End: 2022-11-04 | Stop reason: SDUPTHER

## 2021-10-12 NOTE — TELEPHONE ENCOUNTER
This medication refill is regarding a electronic request.  Refill requested by [address] Requested Prescriptions     Pending Prescriptions Disp Refills    hydrOXYzine (VISTARIL) 50 MG capsule [Pharmacy Med Name: HYDROXYZINE JAXON 50 MG CAP] 90 capsule 1     Sig: TAKE 1 CAPSULE BY MOUTH THREE TIMES DAILY AS NEEDED FOR ANXIETY     Date of last visit: 9/28/2021   Date of next visit: None  Date of last refill: 8/20/2021 #90/1    Rx verified, ordered and set to EP.

## 2021-10-15 ENCOUNTER — PATIENT MESSAGE (OUTPATIENT)
Dept: FAMILY MEDICINE CLINIC | Age: 41
End: 2021-10-15

## 2021-10-15 NOTE — TELEPHONE ENCOUNTER
From: Mayela Michaud  To: MATY Hewitt - CNP  Sent: 10/15/2021 2:06 PM EDT  Subject: Prescription Question    Good afternoon   I wanted to discuss the new medication. It seems to be giving me really bad headaches. Today was so bad I couldn't get out of bed and make it to work. Is there a different medication we can try.  Thank you

## 2021-10-21 RX ORDER — TADALAFIL 10 MG/1
10 TABLET ORAL DAILY PRN
Qty: 10 TABLET | Refills: 0 | Status: SHIPPED | OUTPATIENT
Start: 2021-10-21 | End: 2021-10-26 | Stop reason: SDUPTHER

## 2021-10-22 ENCOUNTER — OFFICE VISIT (OUTPATIENT)
Dept: PHYSICAL MEDICINE AND REHAB | Age: 41
End: 2021-10-22
Payer: COMMERCIAL

## 2021-10-22 VITALS
HEIGHT: 70 IN | DIASTOLIC BLOOD PRESSURE: 88 MMHG | BODY MASS INDEX: 36.36 KG/M2 | SYSTOLIC BLOOD PRESSURE: 150 MMHG | WEIGHT: 254 LBS

## 2021-10-22 DIAGNOSIS — M79.18 MYOFASCIAL PAIN: ICD-10-CM

## 2021-10-22 DIAGNOSIS — G89.4 CHRONIC PAIN SYNDROME: ICD-10-CM

## 2021-10-22 DIAGNOSIS — M47.816 LUMBAR SPONDYLOSIS: ICD-10-CM

## 2021-10-22 DIAGNOSIS — M54.59 LUMBAR FACET JOINT PAIN: Primary | ICD-10-CM

## 2021-10-22 PROCEDURE — 99214 OFFICE O/P EST MOD 30 MIN: CPT | Performed by: ANESTHESIOLOGY

## 2021-10-22 RX ORDER — OXYCODONE HYDROCHLORIDE AND ACETAMINOPHEN 5; 325 MG/1; MG/1
1 TABLET ORAL 2 TIMES DAILY PRN
Qty: 60 TABLET | Refills: 0 | Status: SHIPPED | OUTPATIENT
Start: 2021-10-29 | End: 2021-11-22 | Stop reason: SDUPTHER

## 2021-10-22 NOTE — PROGRESS NOTES
Chronic Pain/PM&R Clinic Note     Encounter Date: 10/22/21    Subjective:   Chief Complaint:   Chief Complaint   Patient presents with    Follow-up     Percocet refill       History of Present Illness:   Kingsley Vallejo is a 37 y.o. male seen in the clinic on 01/20/21 for his history of chronic low back pain. He has a medical history of anxiety/depression and diverticulitis. He has been dealing with low back pain for the last 4 years after he fell off a roof onto a ladder. He describes his pain is primarily in the axial back with some radiation down into bilateral buttocks. He rates his pain a constant 7/10 aching, stabbing pain. His pain is worse when he is upright or doing any activities particularly working. He works as a  . His pain is improved with rest.  He denies any focal leg weakness, new paresthesias, saddle anesthesia, bowel/bladder incontinence. He states his pain is interfering with his everyday activities including work and sleep. Of note, he was previously seen Dr. Db Dang and underwent diagnostic lumbar medial branch blocks on 11/18/2020 in which he reports benefit for a couple hours. He was previously taking tramadol and Flexeril with no benefit. He underwent physical therapy within the last year with no benefit as well. Today, 10/22/2021, patient presents for planned follow-up for chronic low back pain. He states that he is doing relatively well overall since his last visit. He states that he continues to work and feels like the Percocet is helping him get through his workday. He states he has been in discussion is Dr. Vesta Bruce approval for liposuction to help with weight loss efforts. He continues with his low back stretches and feels like these are very helpful.   He states he would like to proceed with a core strengthening program.  He denies any symptoms of worsening radicular leg pain,new leg paresthesias, saddle anesthesia, bowel/bladder incontinence.       History of Interventions:   Surgery: No previous back surgeries  Injections: Lumbar MBBs (11/18/20) - noted benefit  Diagnostic and confirmatory lumbar MBBs (2/23/21 and 3/23/21) - >80% relief and 100% relief respectively  Lumbar RFA (4/20/2021, 5/25/2021)significant relief    Current Treatment Medications:   Percocet 5 mg twice daily as neededprescribed by me  Paxil - anxiety/depression   Wellbutrin - anxiety/depression  Baclofen 10 mg 3 times daily as needed    Historical Treatment Medications:   NSAIDs - unable to take due to diverticulitis  Flexeril - no benefit  Tramadol - no benefit  Gabapentin - unable to tolerate (worsening anxiety and RLS)  Lyrica - unable to tolerate (worsening anxiety and RLS)  Norcoineffective    Imaging:  MRI L-spine (7/14/20)        Past Medical History:   Diagnosis Date    Chronic back pain     Depression     GERD (gastroesophageal reflux disease)     History of high blood pressure     Hypertension        Past Surgical History:   Procedure Laterality Date    COLONOSCOPY  12/2020    WNL    PAIN MANAGEMENT PROCEDURE Bilateral 11/18/2020    L3, L4 medial branch and L5 dorsal rami injection, bilateral performed by Davion Keating MD at [place] [address] Bilateral 2/23/2021    medial branch blocks at bilateral L4/L5 and L5/S1 performed by Luisa Parker DO at [place] [address] Bilateral 3/23/2021    confirmatory medial branch blocks at bilateral L4/L5 and L5/S1 performed by Luisa Parker DO at [place] [address] Right 4/20/2021    the option of thermal radiofrequency ablation at RIGHT medial branches at L4/L5 and L5/S1 performed by Luisa Parker DO at [facility] [address] Left 5/25/2021    thermal radiofrequency ablation at LEFT medial branches L4/L5 and L5/S1 performed by Luisa Parker DO at [address] [place] [address] CENTER OR       No family history on file. Social History     Socioeconomic History    Marital status:      Spouse name: Estela Magallon Number of children: Not on file    Years of education: Not on file    Highest education level: Not on file   Occupational History    Not on file   Tobacco Use    Smoking status: Current Every Day Smoker     Packs/day: 1.00     Types: Cigarettes    Smokeless tobacco: Never Used   Vaping Use    Vaping Use: Never used   Substance and Sexual Activity    Alcohol use: Never    Drug use: Never    Sexual activity: Not on file   Other Topics Concern    Not on file   Social History Narrative    Not on file     Social Determinants of Health     Financial Resource Strain: Unknown    Difficulty of Paying Living Expenses: Patient refused   Food Insecurity: Unknown    Worried About [address] [employer] in the Last Year: Patient refused    [address] [employer] in the Last Year: Patient refused   Transportation Needs:     Lack of Transportation (Medical):      Lack of Transportation (Non-Medical):    Physical Activity:     Days of Exercise per Week:     Minutes of Exercise per Session:    Stress:     Feeling of Stress :    Social Connections:     Frequency of Communication with Friends and Family:     Frequency of Social Gatherings with Friends and Family:     Attends [employer] Services:     Active Member of Clubs or Organizations:     Attends Club or Organization Meetings:     Marital Status:    Intimate Partner Violence:     Fear of Current or Ex-Partner:     Emotionally Abused:     Physically Abused:     Sexually Abused:        Medications & Allergies:   Current Outpatient Medications   Medication Instructions    buPROPion (WELLBUTRIN XL) 150 mg, Oral, EVERY MORNING    hydrOXYzine (VISTARIL) 50 MG capsule TAKE 1 CAPSULE BY MOUTH THREE TIMES DAILY AS NEEDED FOR ANXIETY    lisinopril (PRINIVIL;ZESTRIL) 10 mg, Oral, DAILY    [START ON 10/29/2021] oxyCODONE-acetaminophen (PERCOCET) 5-325 MG per tablet 1 tablet, Oral, 2 TIMES DAILY PRN    pantoprazole (PROTONIX) 40 mg, Oral, 2 TIMES DAILY BEFORE MEALS    PARoxetine (PAXIL) 20 mg, Oral, DAILY    sildenafil (VIAGRA) 50 mg, Oral, DAILY PRN    tadalafil (CIALIS) 10 mg, Oral, DAILY PRN    tiZANidine (ZANAFLEX) 4 MG tablet TAKE 1 TABLET BY MOUTH EVERY 8 HOURS AS NEEDED FOR MUSCLE TIGHTNESS/SPASMS       No Known Allergies    Review of Systems:   Constitutional: negative for weight changes or fevers  Genitourinary: negative for bowel/bladder incontinence   Musculoskeletal: positive for low back pain  Neurological: negative for any leg weakness or numbness/tingling  Behavioral/Psych:  Positive for anxiety/depression   All other systems reviewed and are negative    Objective:     Vitals:    10/22/21 1112   BP: (!) 150/88       Constitutional: Pleasant, no acute distress   Head: Normocephalic, atraumatic   Eyes: Conjunctivae normal   Neck: Supple, symmetrical   Lungs: Normal respiratory effort, non-labored breathing   Cardiovascular: Limbs warm and well perfused   Abdomen: Non-protruded   Musculoskeletal: Muscle bulk symmetric, no atrophy, no gross deformities   · Lumbar Spine: Lumbar paraspinals tender bilaterally. SLR neg bilaterally. Positive facet loading bilaterally. Neurological: Cranial nerves II-XII grossly intact. · Gait - Slightly antalgic gait. Ambulates without assist device. · Motor: No focal motor deficit  Skin: No rashes or lesions visible in low back  Psychological: Cooperative, no exaggerated pain behaviors     Assessment:    Diagnosis Orders   1. Lumbar facet joint pain  [facility]   2. Chronic pain syndrome  oxyCODONE-acetaminophen (PERCOCET) 5-325 MG per tablet   3. Myofascial pain     4. Lumbar spondylosis           Andrea Hodges is a 37 y.o.male presenting to the pain clinic for evaluation of chronic low back pain.   His clinical history and physical examination are consistent with lumbar facet mediated pain and associated myofascial pain. I have set him up for diagnostic lumbar medial branch blocks targeting the facet joints of L4/L5 and L5/S1 bilaterally. He has completed his lumbar radiofrequency ablation with significant results. He does continue to take his Percocet as needed for breakthrough pain with improvement in his overall function. He is continued his weight loss efforts and is pursuing liposuction. I have referred him to physical therapy for a core stability program.  We will follow-up in 12 weeks for reevaluation. Plan: The following treatment recommendations and plan were discussed in detail with Anthony Saucedo. Imaging:   I have reviewed patients imaging of MRI L-spine (report) and results were discussed with patient today. Analgesics: For continued chronic pain, we will continue Percocet 5 mg twice daily as needed for severe pain (pain greater than 7/10). In addition, this medication is to help keep him more functional and continue to work. I explained the risks of using this medication long-term including risk of developing tolerance, physical dependence, and abuse potential of medication. I advised the patient on to take this medication when pain is severe (pain greater than 7/10). In addition, I advised the patient to keep this medication stored in a safe place. If the medication is stolen, the patient will not get refills on the medication. OARRS reviewed today and is appropriate. Pain contract agreement signed today. Patient is taking Acetaminophen. Patient informed that the maximum amount of acetaminophen taken on a regular basis should only be 4000 mg per day. Adjuvants:   Patient can continue baclofen 10 mg 3 times a day as needed. Interventions:   No interventions pursued at this visit. Anticoagulation/NPO Recommendations:   No interventions pursued at this visit.     Multidisciplinary Pain Management: In the presence of complex, chronic, and multi-factorial pain, the importance of a multidisciplinary approach to pain management in the patients management regimen was emphasized and discussed in great detail.    PHYSICAL THERAPY:  I would advise the patient to continue gentle range of motion stretching exercises twice a day  -Patient provided with new lumbar stretches he should perform with his current low back stretches daily which include child's pose and cobra position    Referrals:  Physical Therapy - for core stability program    Prescriptions Written This Visit:   Percocet 5 mg (#60, 0 refills)    Follow-up: 12 weeks      Maria E Ndiaye DO  Interventional Pain Management/PM&R   [place]

## 2021-11-22 ENCOUNTER — PATIENT MESSAGE (OUTPATIENT)
Dept: PHYSICAL MEDICINE AND REHAB | Age: 41
End: 2021-11-22

## 2021-11-22 DIAGNOSIS — G89.4 CHRONIC PAIN SYNDROME: ICD-10-CM

## 2021-11-22 NOTE — TELEPHONE ENCOUNTER
From: Bernardino Fonseca  To: Dr. Elizabeth Ortega: 2021 12:48 PM EST  Subject: Prescription Question    Good Afternoon   I am reaching out to see if I can get a refill on my pain medication for next month.  Thanks to all for all you do and may you have a blessed Thanksgiving

## 2021-11-22 NOTE — TELEPHONE ENCOUNTER
OARRS reviewed. UDS: Negative   Last seen: 10/22/2021.  Follow-up:   Future Appointments   Date Time Provider Nick Naranjo   1/14/2022 11:00 AM DO FAUSTO Quick [facility]

## 2021-11-23 RX ORDER — OXYCODONE HYDROCHLORIDE AND ACETAMINOPHEN 5; 325 MG/1; MG/1
1 TABLET ORAL 2 TIMES DAILY PRN
Qty: 60 TABLET | Refills: 0 | Status: SHIPPED | OUTPATIENT
Start: 2021-11-28 | End: 2021-12-28

## 2021-11-23 NOTE — TELEPHONE ENCOUNTER
Last written: 8-12-20 #90/0  Last seen: 7-10-20  Next visit: 9-14-20    Order pended for #90/0 [de-identified] : 1 week s/p total thyroidectomy and neck dissection.  denies dysphagia, hoarseness or perioral or hand tingling.  feels well on Synthroid 100 mcg daily.  taking 1 tums daily. I have reviewed all old and new data and available images.  Additional information was obtained from others present at the time of visit to ensure the completeness of the history

## 2021-11-26 DIAGNOSIS — I10 ESSENTIAL HYPERTENSION: ICD-10-CM

## 2021-11-28 RX ORDER — TADALAFIL 10 MG/1
10 TABLET ORAL DAILY PRN
Qty: 30 TABLET | Refills: 1 | OUTPATIENT
Start: 2021-11-28

## 2021-11-28 RX ORDER — LISINOPRIL 10 MG/1
10 TABLET ORAL DAILY
Qty: 90 TABLET | Refills: 1 | Status: SHIPPED | OUTPATIENT
Start: 2021-11-28

## 2021-11-28 NOTE — TELEPHONE ENCOUNTER
Request sent via [employer] message for refills of cialis 10 mg qd prn and lisinopril 10 mg qd. Last seen 9/28/21, no future appt scheduled. Patient should have refills available on Cialis at [facility]. Lisinopril was last sent to [address] for refill of Lisinopril to [facility]? Order pended. Will send message to patient to check with [facility] regarding Cialis.

## 2021-12-30 RX ORDER — TIZANIDINE 4 MG/1
4 TABLET ORAL EVERY 8 HOURS PRN
Qty: 90 TABLET | Refills: 2 | Status: SHIPPED | OUTPATIENT
Start: 2022-01-02 | End: 2022-03-03 | Stop reason: ALTCHOICE

## 2022-01-03 RX ORDER — TADALAFIL 10 MG/1
10 TABLET ORAL DAILY PRN
Qty: 30 TABLET | Refills: 1 | Status: SHIPPED | OUTPATIENT
Start: 2022-01-03 | End: 2022-06-29 | Stop reason: SDUPTHER

## 2022-01-10 ENCOUNTER — OFFICE VISIT (OUTPATIENT)
Dept: FAMILY MEDICINE CLINIC | Age: 42
End: 2022-01-10
Payer: COMMERCIAL

## 2022-01-10 VITALS
DIASTOLIC BLOOD PRESSURE: 80 MMHG | RESPIRATION RATE: 16 BRPM | TEMPERATURE: 98.2 F | WEIGHT: 244.6 LBS | BODY MASS INDEX: 35.1 KG/M2 | HEART RATE: 88 BPM | SYSTOLIC BLOOD PRESSURE: 134 MMHG

## 2022-01-10 DIAGNOSIS — Z13.1 SCREENING FOR DIABETES MELLITUS: ICD-10-CM

## 2022-01-10 DIAGNOSIS — I10 ESSENTIAL HYPERTENSION: ICD-10-CM

## 2022-01-10 DIAGNOSIS — I10 PRIMARY HYPERTENSION: ICD-10-CM

## 2022-01-10 DIAGNOSIS — F41.9 ANXIETY: ICD-10-CM

## 2022-01-10 DIAGNOSIS — Z13.220 SCREENING CHOLESTEROL LEVEL: ICD-10-CM

## 2022-01-10 DIAGNOSIS — F32.A DEPRESSION, UNSPECIFIED DEPRESSION TYPE: Primary | ICD-10-CM

## 2022-01-10 PROCEDURE — 99213 OFFICE O/P EST LOW 20 MIN: CPT | Performed by: NURSE PRACTITIONER

## 2022-01-10 RX ORDER — BUPROPION HYDROCHLORIDE 300 MG/1
300 TABLET ORAL EVERY MORNING
Qty: 30 TABLET | Refills: 3 | Status: SHIPPED | OUTPATIENT
Start: 2022-01-10 | End: 2022-08-18

## 2022-01-10 ASSESSMENT — PATIENT HEALTH QUESTIONNAIRE - PHQ9
SUM OF ALL RESPONSES TO PHQ9 QUESTIONS 1 & 2: 2
1. LITTLE INTEREST OR PLEASURE IN DOING THINGS: 1
2. FEELING DOWN, DEPRESSED OR HOPELESS: 1
SUM OF ALL RESPONSES TO PHQ QUESTIONS 1-9: 2

## 2022-01-10 ASSESSMENT — ENCOUNTER SYMPTOMS
BACK PAIN: 1
SHORTNESS OF BREATH: 0
CHEST TIGHTNESS: 0
ABDOMINAL PAIN: 0
EYES NEGATIVE: 1
BLOOD IN STOOL: 0

## 2022-01-10 NOTE — PATIENT INSTRUCTIONS
Patient Education        bupropion  Pronunciation:  byoo PRO pee on  Brand:  Aplenzin, Forfivo XL, Wellbutrin SR, Wellbutrin XL, Zyban Advantage Pack  What is the most important information I should know about bupropion? You should not take bupropion if you have seizures or an eating disorder, or if you have suddenly stopped using alcohol, seizure medication, or sedatives. If you take Wellbutrin for depression, do not also take Zyban to quit smoking. Do not use bupropion within 14 days before or 14 days after you have used an MAO inhibitor, such as isocarboxazid, linezolid, methylene blue injection, phenelzine, rasagiline, selegiline, or tranylcypromine. Some young people have thoughts about suicide when first taking an antidepressant. Stay alert to changes in your mood or symptoms. Report any new or worsening symptoms to your doctor. What is bupropion? Bupropion is an antidepressant used to treat major depressive disorder and seasonal affective disorder. The Zyban brand of bupropion is used to help people stop smoking by reducing cravings and other withdrawal effects. Bupropion may also be used for purposes not listed in this medication guide. What should I discuss with my healthcare provider before taking bupropion? You should not take bupropion if you are allergic to it, or if you have:  · a seizure disorder;  · an eating disorder such as anorexia or bulimia; or  · if you have suddenly stopped using alcohol, seizure medication, or a sedative (such as Xanax, Valium, Fiorinal, Klonopin, and others). Do not use an MAO inhibitor within 14 days before or 14 days after you take bupropion. A dangerous drug interaction could occur. MAO inhibitors include isocarboxazid, linezolid, phenelzine, rasagiline, selegiline, and tranylcypromine. Do not take bupropion to treat more than one condition at a time. If you take bupropion for depression, do not also take this medicine to quit smoking.    Bupropion may cause seizures, especially if you have certain medical conditions or use certain drugs. Tell your doctor about all of your medical conditions and the drugs you use. Tell your doctor if you have ever had:  · a head injury, seizures, or brain or spinal cord tumor;  · narrow-angle glaucoma;  · heart disease, high blood pressure, or a heart attack;  · diabetes;  · kidney or liver disease (especially cirrhosis);  · depression, bipolar disorder, or other mental illness; or  · if you drink alcohol. Some young people have thoughts about suicide when first taking an antidepressant. Your doctor will need to check your progress at regular visits. Your family or other caregivers should also be alert to changes in your mood or symptoms. Ask your doctor about taking this medicine if you are pregnant. It is not known whether bupropion will harm an unborn baby. However, you may have a relapse of depression if you stop taking your antidepressant. Tell your doctor right away if you become pregnant. Do not start or stop taking bupropion without your doctor's advice. If you are pregnant, your name may be listed on a pregnancy registry to track the effects of bupropion on the baby. It may not be safe to breastfeed while using this medicine. Ask your doctor about any risk. Bupropion is not approved for use by anyone younger than 25years old. How should I take bupropion? Follow all directions on your prescription label and read all medication guides or instruction sheets. Your doctor may occasionally change your dose. Use the medicine exactly as directed. Too much of this medicine can increase your risk of a seizure. You may take bupropion with or without food. Swallow the extended-release tablet whole and do not crush, chew, or break it. You should not change your dose or stop using bupropion suddenly, unless you have a seizure while taking this medicine. Stopping suddenly can cause unpleasant withdrawal symptoms.  Ask your doctor how to safely stop using bupropion. If you take Zyban to help you stop smoking, you may continue to smoke for about 1 week after you start the medicine. Set a date to quit smoking during the first 2 weeks of treatment. Talk to your doctor if you have trouble quitting after taking Zyban for 7 to 12 weeks. Your doctor may prescribe a nicotine replacement product (such as patches or gum) to help you stop smoking. Start using the nicotine replacement product on the same day you stop (quit) smoking or using tobacco products. Some people taking bupropion (Wellbutrin or Zyban) have had high blood pressure that is severe, especially when also using a nicotine replacement product (patch or gum). Your blood pressure may need to be checked before and during treatment with bupropion. Read and carefully follow any Instructions for Use provided with your medicine. Ask your doctor or pharmacist if you do not understand these instructions. You may have nicotine withdrawal symptoms when you stop smoking, including: increased appetite, weight gain, trouble sleeping, trouble concentrating, slower heart rate, having the urge to smoke, and feeling anxious, restless, depressed, angry, frustrated, or irritated. These symptoms may occur with or without using medication such as Zyban. Smoking cessation may also cause new or worsening mental health problems, such as depression. This medicine may affect a drug-screening urine test and you may have false results. Tell the laboratory staff that you use bupropion. Store at room temperature away from moisture, heat, and light. What happens if I miss a dose? Skip the missed dose and use your next dose at the regular time. Do not use two doses at one time. What happens if I overdose? Seek emergency medical attention or call the Poison Help line at 1-910.871.2441.  An overdose of bupropion can be fatal.  Overdose symptoms may include muscle stiffness, hallucinations, fast or uneven heartbeat, shallow breathing, or fainting. What should I avoid while taking bupropion? Drinking alcohol with bupropion may increase your risk of seizures. If you drink alcohol regularly, talk with your doctor before changing the amount you drink. Bupropion can also cause seizures in a regular drinker who suddenly stops drinking at the start of treatment with bupropion. Avoid driving or hazardous activity until you know how this medicine will affect you. Your reactions could be impaired. What are the possible side effects of bupropion? Get emergency medical help if you have signs of an allergic reaction (hives, itching, fever, swollen glands, difficult breathing, swelling in your face or throat) or a severe skin reaction (fever, sore throat, burning eyes, skin pain, red or purple skin rash with blistering and peeling). Report any new or worsening symptoms to your doctor, such as: mood or behavior changes, anxiety, depression, panic attacks, trouble sleeping, or if you feel impulsive, irritable, agitated, hostile, aggressive, restless, hyperactive (mentally or physically), more depressed, or have thoughts about suicide or hurting yourself. Call your doctor at once if you have:  · a seizure (convulsions);  · confusion, unusual changes in mood or behavior;  · blurred vision, tunnel vision, eye pain or swelling, or seeing halos around lights;  · fast or irregular heartbeats; or  · a manic episode --racing thoughts, increased energy, reckless behavior, feeling extremely happy or irritable, talking more than usual, severe problems with sleep.   Common side effects may include:  · dry mouth, sore throat, stuffy nose;  · ringing in the ears;  · blurred vision;  · nausea, vomiting, stomach pain, loss of appetite, constipation;  · sleep problems (insomnia);  · tremors, sweating, feeling anxious or nervous;  · fast heartbeats;  · confusion, agitation, hostility;  · rash;  · weight loss;  · increased urination;  · headache, dizziness; or  · muscle or joint pain. This is not a complete list of side effects and others may occur. Call your doctor for medical advice about side effects. You may report side effects to FDA at 3-198-CHX-2208. What other drugs will affect bupropion? You may have a higher risk of seizures if you use certain other medicines while taking bupropion. Many drugs can affect bupropion. This includes prescription and over-the-counter medicines, vitamins, and herbal products. Not all possible interactions are listed here. Tell your doctor about all your current medicines and any medicine you start or stop using. Where can I get more information? Your pharmacist can provide more information about bupropion. Remember, keep this and all other medicines out of the reach of children, never share your medicines with others, and use this medication only for the indication prescribed. Every effort has been made to ensure that the information provided by [address] is accurate, up-to-date, and complete, but no guarantee is made to that effect. Drug information contained herein may be time sensitive. [employer] information has been compiled for use by healthcare practitioners and consumers in the [place] and therefore [employer] does not warrant that uses outside of the [place] are appropriate, unless specifically indicated otherwise. [employer] drug information does not endorse drugs, diagnose patients or recommend therapy. [employer] drug information is an informational resource designed to assist licensed healthcare practitioners in caring for their patients and/or to serve consumers viewing this service as a supplement to, and not a substitute for, the expertise, skill, knowledge and judgment of healthcare practitioners.  The absence of a warning for a given drug or drug combination in no way should be construed to indicate that the drug or drug combination is safe, effective or appropriate for any given patient. [facility] does not assume any responsibility for any aspect of healthcare administered with the aid of information [facility] provides. The information contained herein is not intended to cover all possible uses, directions, precautions, warnings, drug interactions, allergic reactions, or adverse effects. If you have questions about the drugs you are taking, check with your doctor, nurse or pharmacist.  Copyright 1832-2001 [address]: 24.01. Revision date: 1/27/2020. Care instructions adapted under license by [facility] ([facility]). If you have questions about a medical condition or this instruction, always ask your healthcare professional. [place] [address] any warranty or liability for your use of this information.

## 2022-01-10 NOTE — PROGRESS NOTES
Chief Complaint   Patient presents with    Follow-up     Would like to talk about antidepressants and anxiety. SUBJECTIVE     Brisa Salazar is a 37 y.o.male      Pt complains of worsening depression over the last several months. He reports it is difficult to get motivated. Also reports decreased pleasure in life. Anxiety is stable most of the time. Denies thoughts of harming self. No changes to trigger this. He is taking his medications as prescribed and states they originally helped but do not seem to be helping as much anymore. Review of Systems   Constitutional: Negative for chills, fatigue, fever and unexpected weight change. HENT: Negative. Eyes: Negative. Respiratory: Negative for chest tightness and shortness of breath. Cardiovascular: Negative for chest pain, palpitations and leg swelling. Gastrointestinal: Negative for abdominal pain and blood in stool. Genitourinary: Negative for dysuria. Musculoskeletal: Positive for back pain (chronic). Negative for joint swelling. Skin: Negative for rash. Neurological: Negative for dizziness. Psychiatric/Behavioral: Positive for decreased concentration and dysphoric mood. All other systems reviewed and are negative. OBJECTIVE     /80   Pulse 88   Temp 98.2 °F (36.8 °C) (Oral)   Resp 16   Wt 244 lb 9.6 oz (110.9 kg)   BMI 35.10 kg/m²     Physical Exam  Vitals and nursing note reviewed. Constitutional:       Appearance: He is well-developed. HENT:      Head: Normocephalic and atraumatic. Right Ear: External ear normal.      Left Ear: External ear normal.      Nose: Nose normal.   Eyes:      Conjunctiva/sclera: Conjunctivae normal.      Pupils: Pupils are equal, round, and reactive to light. Cardiovascular:      Rate and Rhythm: Normal rate and regular rhythm. Pulmonary:      Effort: Pulmonary effort is normal.      Breath sounds: Normal breath sounds.    Abdominal:      General: Bowel sounds are normal. Palpations: Abdomen is soft. Musculoskeletal:         General: Normal range of motion. Cervical back: Normal range of motion and neck supple. Skin:     General: Skin is warm and dry. Neurological:      Mental Status: He is alert and oriented to person, place, and time. Deep Tendon Reflexes: Reflexes are normal and symmetric. Psychiatric:         Behavior: Behavior is withdrawn. Thought Content: Thought content normal.         Judgment: Judgment normal.           No results found for this visit on 01/10/22. ASSESSMENT       Diagnosis Orders   1. Depression, unspecified depression type  T4, Free    TSH without Reflex   2. Essential hypertension  T4, Free    TSH without Reflex   3. Screening for diabetes mellitus  Comprehensive Metabolic Panel   4. Screening cholesterol level  Lipid Panel   5. Primary hypertension     6.  Anxiety  T4, Free    TSH without Reflex       PLAN     Requested Prescriptions     Signed Prescriptions Disp Refills    buPROPion (WELLBUTRIN XL) 300 MG extended release tablet 30 tablet 3     Sig: Take 1 tablet by mouth every morning         Orders Placed This Encounter   Procedures    Comprehensive Metabolic Panel     Standing Status:   Future     Standing Expiration Date:   1/10/2023    Lipid Panel     Standing Status:   Future     Standing Expiration Date:   1/10/2023     Order Specific Question:   Is Patient Fasting?/# of Hours     Answer:   yes/12    T4, Free     Standing Status:   Future     Standing Expiration Date:   1/10/2023    TSH without Reflex     Standing Status:   Future     Standing Expiration Date:   1/10/2023     Labs as above  Will increase Wellbutrin XL to 300 mg daily  Pt to send update in 4-6 weeks  Follow up in 6 months or sooner if depression does not improve          Electronically signed by MATY Weinberg CNP on 1/10/2022 at 4:25 PM

## 2022-01-14 ENCOUNTER — OFFICE VISIT (OUTPATIENT)
Dept: PHYSICAL MEDICINE AND REHAB | Age: 42
End: 2022-01-14
Payer: COMMERCIAL

## 2022-01-14 VITALS
DIASTOLIC BLOOD PRESSURE: 70 MMHG | WEIGHT: 224 LBS | SYSTOLIC BLOOD PRESSURE: 106 MMHG | HEIGHT: 70 IN | BODY MASS INDEX: 32.07 KG/M2

## 2022-01-14 DIAGNOSIS — M54.59 LUMBAR FACET JOINT PAIN: Primary | ICD-10-CM

## 2022-01-14 DIAGNOSIS — M47.816 LUMBAR SPONDYLOSIS: ICD-10-CM

## 2022-01-14 DIAGNOSIS — F32.A ANXIETY AND DEPRESSION: ICD-10-CM

## 2022-01-14 DIAGNOSIS — G89.4 CHRONIC PAIN SYNDROME: ICD-10-CM

## 2022-01-14 DIAGNOSIS — F41.9 ANXIETY AND DEPRESSION: ICD-10-CM

## 2022-01-14 DIAGNOSIS — M79.18 MYOFASCIAL PAIN: ICD-10-CM

## 2022-01-14 PROCEDURE — 99214 OFFICE O/P EST MOD 30 MIN: CPT | Performed by: ANESTHESIOLOGY

## 2022-01-14 NOTE — PROGRESS NOTES
Chronic Pain/PM&R Clinic Note     Encounter Date: 1/14/22    Subjective:   Chief Complaint:   Chief Complaint   Patient presents with    Follow-up       History of Present Illness:   Cyrus Nuñez is a 37 y.o. male seen in the clinic on 01/20/21 for his history of chronic low back pain. He has a medical history of anxiety/depression and diverticulitis. He has been dealing with low back pain for the last 4 years after he fell off a roof onto a ladder. He describes his pain is primarily in the axial back with some radiation down into bilateral buttocks. He rates his pain a constant 7/10 aching, stabbing pain. His pain is worse when he is upright or doing any activities particularly working. He works as a  . His pain is improved with rest.  He denies any focal leg weakness, new paresthesias, saddle anesthesia, bowel/bladder incontinence. He states his pain is interfering with his everyday activities including work and sleep. Of note, he was previously seen Dr. Filipe Garvey and underwent diagnostic lumbar medial branch blocks on 11/18/2020 in which he reports benefit for a couple hours. He was previously taking tramadol and Flexeril with no benefit. He underwent physical therapy within the last year with no benefit as well. 10/22/2021: Patient presents for planned follow-up for chronic low back pain. He states that he is doing relatively well overall since his last visit. He states that he continues to work and feels like the Percocet is helping him get through his workday. He states he has been in discussion is Dr. Peggy Palm approval for liposuction to help with weight loss efforts. He continues with his low back stretches and feels like these are very helpful. He states he would like to proceed with a core strengthening program.  He denies any symptoms of worsening radicular leg pain,new leg paresthesias, saddle anesthesia, bowel/bladder incontinence.     Today, 1/14/2022, patient bilateral L4/L5 and L5/S1 performed by Isabel Wilson DO at [address] Bilateral 3/23/2021    confirmatory medial branch blocks at bilateral L4/L5 and L5/S1 performed by Isabel Wilson DO at [address] Right 4/20/2021    the option of thermal radiofrequency ablation at RIGHT medial branches at L4/L5 and L5/S1 performed by Isabel Wilson DO at [address] Left 5/25/2021    thermal radiofrequency ablation at LEFT medial branches L4/L5 and L5/S1 performed by Isabel Wilson DO at [address]       History reviewed. No pertinent family history. Social History     Socioeconomic History    Marital status:      Spouse name: Leon Gracia Number of children: Not on file    Years of education: Not on file    Highest education level: Not on file   Occupational History    Not on file   Tobacco Use    Smoking status: Current Every Day Smoker     Packs/day: 1.00     Types: Cigarettes    Smokeless tobacco: Never Used   Vaping Use    Vaping Use: Never used   Substance and Sexual Activity    Alcohol use: Never    Drug use: Never    Sexual activity: Not on file   Other Topics Concern    Not on file   Social History Narrative    Not on file     Social Determinants of Health     Financial Resource Strain: Unknown    Difficulty of Paying Living Expenses: Patient refused   Food Insecurity: Unknown    Worried About [address] in the Last Year: Patient refused    [address] in the Last Year: Patient refused   Transportation Needs:     Lack of Transportation (Medical): Not on file    Lack of Transportation (Non-Medical):  Not on file   Physical Activity:     Days of Exercise per Week: Not on file    Minutes of Exercise per Session: Not on file   Stress:     Feeling of Stress : Not on file   Social Connections:     Frequency of Communication with Friends and Family: Not on file    Frequency of Social Gatherings with Friends and Family: Not on file    Attends Sabianist Services: Not on file    Active Member of Clubs or Organizations: Not on file    Attends Club or Organization Meetings: Not on file    Marital Status: Not on file   Intimate Partner Violence:     Fear of Current or Ex-Partner: Not on file    Emotionally Abused: Not on file    Physically Abused: Not on file    Sexually Abused: Not on file   Housing Stability:     Unable to Pay for Housing in the Last Year: Not on file    Number of Places Lived in the Last Year: Not on file    Unstable Housing in the Last Year: Not on file       Medications & Allergies:   Current Outpatient Medications   Medication Instructions    buPROPion (WELLBUTRIN XL) 300 mg, Oral, EVERY MORNING    hydrOXYzine (VISTARIL) 50 MG capsule TAKE 1 CAPSULE BY MOUTH THREE TIMES DAILY AS NEEDED FOR ANXIETY    lisinopril (PRINIVIL;ZESTRIL) 10 mg, Oral, DAILY    pantoprazole (PROTONIX) 40 mg, Oral, 2 TIMES DAILY BEFORE MEALS    PARoxetine (PAXIL) 20 mg, Oral, DAILY    sildenafil (VIAGRA) 50 mg, Oral, DAILY PRN    tadalafil (CIALIS) 10 mg, Oral, DAILY PRN    tiZANidine (ZANAFLEX) 4 mg, Oral, EVERY 8 HOURS PRN       No Known Allergies    Review of Systems:   Constitutional: negative for weight changes or fevers  Genitourinary: negative for bowel/bladder incontinence   Musculoskeletal: positive for low back pain  Neurological: negative for any leg weakness or numbness/tingling  Behavioral/Psych:  Positive for anxiety/depression   All other systems reviewed and are negative    Objective:     Vitals:    01/14/22 1102   BP: 106/70       Constitutional: Pleasant, no acute distress   Head: Normocephalic, atraumatic   Eyes: Conjunctivae normal   Neck: Supple, symmetrical   Lungs: Normal respiratory effort, non-labored breathing   Cardiovascular: Limbs warm and well perfused   Abdomen: Non-protruded   Musculoskeletal: Muscle bulk symmetric, no atrophy, no gross deformities   · Lumbar Spine: Lumbar paraspinals tender bilaterally. SLR neg bilaterally. Positive facet loading bilaterally. Neurological: Cranial nerves II-XII grossly intact. · Gait - Slightly antalgic gait. Ambulates without assist device. · Motor: No focal motor deficit  Skin: No rashes or lesions visible in low back  Psychological: Cooperative, tearful    Assessment:    Diagnosis Orders   1. Lumbar facet joint pain     2. Chronic pain syndrome     3. Myofascial pain     4. Lumbar spondylosis     5. Anxiety and depression           Charo Gilman is a 37 y.o.male presenting to the pain clinic for evaluation of chronic low back pain. His clinical history and physical examination are consistent with lumbar facet mediated pain and associated myofascial pain. I have set him up for diagnostic lumbar medial branch blocks targeting the facet joints of L4/L5 and L5/S1 bilaterally. He has completed his lumbar radiofrequency ablation with significant results. He failed to show up to his physical therapy appointment has been shown to be noncompliant to his treatment plan. I have reached out to the PT department and will have them set up new evaluation. Patient has been stopped off his opioid therapy until he can demonstrate an active role in his chronic pain. He does appear to have underlying depression affecting his chronic pain condition and he is being monitored closely by the PCP team.    Plan: The following treatment recommendations and plan were discussed in detail with Charo Gilman. Imaging:   I have reviewed patients imaging of MRI L-spine (report) and results were discussed with patient today. Analgesics:   Patient has demonstrated noncompliance to his treatment plan by no-show into his physical therapy appointment. He has yet to reschedule an appointment.   I have discontinued his opioid therapy as patient needs to take an active role in his pain management. This will require patient to actively participate in physical therapy. Patient is taking Acetaminophen. Patient informed that the maximum amount of acetaminophen taken on a regular basis should only be 4000 mg per day. Adjuvants:   Patient can continue baclofen 10 mg 3 times a day as needed. Interventions:   No interventions pursued at this visit. Anticoagulation/NPO Recommendations:   No interventions pursued at this visit. Multidisciplinary Pain Management:   In the presence of complex, chronic, and multi-factorial pain, the importance of a multidisciplinary approach to pain management in the patients management regimen was emphasized and discussed in great detail. PHYSICAL THERAPY: I recommend patient reach back out to physical therapy department to set up his initial evaluation, I have reached out to the PT team to set patient up for a new evaluation for the patient    Referrals:  None    Prescriptions Written This Visit:   None    Follow-up: 12 weeks    I spent 30 minutes with the patient and greater than 50% of this time was spent discussing patient's need for an active role in chronic pain and why opioid therapy has been stopped, coordinating care between his primary care team about his ongoing depression and how it is influencing his chronic pain, and reaching out to the physical therapy team for rescheduling his initial evaluation to help with his chronic low back pain.       Annalisa White, DO  Interventional Pain Management/PM&R   [place]

## 2022-01-19 DIAGNOSIS — M79.18 MYOFASCIAL PAIN: ICD-10-CM

## 2022-01-19 DIAGNOSIS — M54.59 LUMBAR FACET JOINT PAIN: Primary | ICD-10-CM

## 2022-01-19 DIAGNOSIS — M47.816 LUMBAR SPONDYLOSIS: ICD-10-CM

## 2022-01-19 DIAGNOSIS — G89.4 CHRONIC PAIN SYNDROME: ICD-10-CM

## 2022-01-28 ENCOUNTER — HOSPITAL ENCOUNTER (OUTPATIENT)
Dept: PHYSICAL THERAPY | Age: 42
Setting detail: THERAPIES SERIES
Discharge: HOME | End: 2022-01-28
Payer: COMMERCIAL

## 2022-01-28 PROCEDURE — 97162 PT EVAL MOD COMPLEX 30 MIN: CPT

## 2022-01-28 PROCEDURE — 97110 THERAPEUTIC EXERCISES: CPT

## 2022-01-28 NOTE — PROGRESS NOTES
** PLEASE SIGN, DATE AND TIME CERTIFICATION BELOW AND RETURN TO [facility] OUTPATIENT REHABILITATION (FAX #: [phone]). ATTEST/CO-SIGN IF ACCESSING VIA INLS9. THANK YOU.**      I certify that I have examined the patient below and determined that Physical Medicine and Rehabilitation service is necessary and that I approve the established plan of care for up to 90 days or as specifically noted. Attestation, signature or co-signature of physician indicates approval of certification requirements.    ________________________ ____________ __________  Physician Signature   Date   Time  [facility]  PHYSICAL THERAPY  [x] EVALUATION  [] DAILY NOTE (LAND) [] DAILY NOTE (AQUATIC ) [] PROGRESS NOTE [] DISCHARGE NOTE    [x] [facility]   [] [place] [address]    [] [facility]   [] Micheal Díaz    Date: 2022  Patient Name:  Alexei Soliz  : 1980  MRN: [id number]  CSN: [id number]    Referring Practitioner Erika Carrasco DO   Diagnosis Other low back pain [M54.59]  Chronic pain syndrome [G89.4]    Treatment Diagnosis Lumbosacral pain with limited tolerance to sitting, forward flexion, stooping, squatting and extension of spine. Date of Evaluation 22    Additional Pertinent History History of RFA and injections to lumbar spine, 4 year history of chronic back pain, lumbar facet pain, chronic pain syndrome, anxiety/depression, HTN       Functional Outcome Measure Used Modified Oswestry   Functional Outcome Score 23/50 @ 46% (22)       Insurance: Primary: Payor: Savanna Fuller /  /  / ,   Secondary:    Authorization Information: PATIENT RESPONSIBILITY AND/OR CO-PAY: $20 COPAY PER VISIT PLUS DEDUCTIBLE.    SECONDARY INSURANCE COMPANY:  NONE       PRE CERTIFICATION REQUIRED: NO  INSURANCE THERAPY BENEFIT:  60 VISITS PT ONLY   PER PLAN YEAR,  TO   AQUATIC THERAPY COVERED: YES  MODALITIES COVERED:  YES  TELEHEALTH COVERED: REFERENCE NUMBER: [id number]   Visit # 1, 1/10 for progress note   Visits Allowed: 60 visits   Recertification Date: 8/55/8147   Physician Follow-Up: 4/3/2022   Physician Orders: PT evaluate/treat. History of Present Illness: Patient reports of 4 year history of back pain. Patient reports he fell off roof onto A frame ladder. Has had history of ablations and injections at his low back that have been helpful. He follows with Dr. Naomi Juarez in Pain Management. Patient reports of pain rating left side of low back to tailbone region. Notes back pain aggravated with sitting >15 minutes, standing and walking okay. Noted difficulty with bending forward to tie shoes and place socks on. Notes can able to lay on his back with knees bent or straight. Uncomfortable with laying on his side. Can't lay on stomach either. Notes feels okay with laying on his back. On pain medications and Baclofen. Injections and ablations helping as well. Notes wearing off of the injections from 1 year ago. SUBJECTIVE:   Patient reports of 4 year history of back pain. Patient reports he fell off roof onto A frame ladder. Has had history of ablations and injections at his low back that have been helpful. He follows with Dr. Naomi Juarez in Pain Management. Patient reports of pain rating left side of low back to tailbone region. Notes back pain aggravated with sitting >15 minutes, standing and walking okay. Noted difficulty with bending forward to tie shoes and place socks on. Notes can able to lay on his back with knees bent or straight. Uncomfortable with laying on his side. Can't lay on stomach either. Notes feels okay with laying on his back. On pain medications and Baclofen. Injections and ablations helping as well. Notes wearing off of the injections from 1 year ago. Social/Functional History and Current Status:  Medications and Allergies have been reviewed and are listed on Medical History Questionnaire.     Usman Chung lives with a roommate in a multiple floor home with stairs and a handrail to enter. 4-5 steps    Task Previous Current   ADLs  Independent Modified Independent assist with lifting >50#x,    IADL's Independent Independent modified assist with lifting and bending activities   Ambulation Independent Independent   Transfers Independent Independent   Recreation play guitar play guitar   Community Integration Independent Independent   Driving Active  Active   Time of sitting varies up to 1-2 hours   Work [employer]. 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 Full-Time. see previous, full duty work without restrictions     Objective:    OBSERVATION   Pain 7/10 left lateral lumbar region to tailbone. Prolonged sitting notes hip joint pain. Palpation Note tenderness at L5, S1, sacrum, PSIS right/left   Sensation Intact LEs sensation. Arms with numbness and tingling intermittent all fingers worst at night. Edema nil   Spinal Accessory Motion    Bed Mobility gaurded with bed mobility due to back pain. Logroll technique instruction. Transfers Independent with sit<>stand without use of armrests, Mild gaurding noted with sit<>stand due to back pain   Ambulation Note decreased trunk rotation with ambulation. Normal merlin and wt shift even through right/left LE   Stairs N/A   Balance N/A       POSTURE    No Deficit Deficit Comments   Forward Head  x    Rounded Shoulders  x    Kyphosis  x    Lordosis  x Reduced lumbar lordotic curve with flat back posture   Lateral Shift x     Scoliosis x     Iliac Crest x     PSIS x     ASIS x     Leg Length Discrp x     Slumped Sitting  x        TRUNK RANGE OF MOTION   Flexion: To mid patellar level fingertips from floor 60% restricted   Extension: 50% restricted.  Pain at end motions of this range   Lateral Flexion Left:    Lateral Flexion Right:    Rotation Left:    Rotation Right:    Trunk Range of Motion is [facility]  []     LOWER EXTREMITY RANGE OF MOTION    Left Right Comments   Hip Flexion knee to chest 110 degrees 110 degrees    Hip Extension      Hip ABDuction      Hip ADDuction      Hip Internal Rotation      Hip External Rotation      Hip Range of Motion is [facility]  [x]      Knee Flexion      Knee Extension      Knee Range of Motion is [facility]  [x]       LOWER EXTREMITY STRENGTH    Left Right Comments   Hip Flexion 5/5 5/5    Hip Abduction      Hip Adduction      Hip Extension      Hip External Rotation      Knee Flexion 5/5 5/5    Knee Extension 5/5 5/5    Ankle DF 5/5 5/5    Ankle PF      LE strength is WFL [x]      CORE STRENGTH   B SLR TEST:          Seconds  Reduced core control and strength SLR not assessed due to improper stabilization and poor awareness of optimal neutral position       SPECIAL TESTS (+/-)    Left Right Comments   SLR  - -    90/90 Hamstring length - 30 degrees -30 degrees    SKTC + +    DKTC      Slump - -    SI Compression/Distraction + +    ROZINA - _    FADIR - -    Jerrye Polio            TREATMENT   Precautions: NO extension or flexion past 40 degrees. Decompression position in 90/90 best position for pain management. Pain: 7/10    X in shaded column indicates activity completed today   Modalities Parameters/  Location  Notes                     Manual Therapy Time/Technique  Notes                     Exercise/Intervention   Notes   Supine in 90/90 decompression position   x    Discussion on core engagement and contraction of abdominals with decompression position, supine<>sit   x    Body mechanics using logroll technique for supine<>sit   x    Discussion/demonstration on spine model location of L5, S1 facets and lumbosacral region for regions of pain   x                                                       Specific Interventions Next Treatment: 90/90 position decompression position with use of CP/HP.  Core control exercises in supine hooklying, 90/90 position, seated and standing progression with strengthening. Body mechanics. US, soft tissue mobilizations. Activity/Treatment Tolerance:  []  Patient tolerated treatment well  []  Patient limited by fatigue  [x]  Patient limited by pain   []  Patient limited by medical complications  []  Other:     Assessment: Patient referred to PT for lumbar facet pain. Patient demonstrates moderate ROM restriction for lumbar ROM in flexion and extension. Discomfort at L5S1, lumbosacral region. PSIS region. Aggravated with extension, flexion past 40 degrees. Note bilateral hamstring tightness 30 degrees restriction 90/90 position. Note decreased awareness of optimal lumbar neutral, abdominal engagement core work/lumbar stabilization to ease pain at lumbar segments. Forward shoulder/neck posture. Slouched sitting posture, mild thoracic kyphosis. Decreased thoracic articulation, lumbar articulation, muscle tightness at paraspinals. Will follow 2x per week to address deficits/dysfunction, decreased core strength. Body Structures/Functions/Activity Limitations: impaired activity tolerance, impaired ROM, impaired strength, pain and abnormal posture  Prognosis: good      Goals    Patient Goal: Patient would like less pain. Be able to tolerate his job better. Short Term Goals: 4 weeks  1. Patient to report of decreased pain levels 7-8/10 lumbosacral and SI levels to 3-4/10 with pain managed with decompression positions 90/90, improved sitting, standing tolerance. 2. Patient to demonstrate increased lumbar ROM in flexion with decreased gaurding due to back pain with fingertips from floor to mid shin level and increased ease to upright standing. 3. Patient to have increased awareness of optimal lumbar neutral position with increased core control, strength, with ability to complete up to 15 reps with series of exercises. 4. Patient to demonstrate proper body mechanics with improved functional mobility with sit<>stand, partial squats,lifting techniques.   5. Hamstring flexibility to 80 degrees SLR, with 90/90 position limited from 30 degrees to 10 degrees. Long Term Goals: 8 weeks  1. Modified Oswestry from 23/50 to 15/50  2. Patient to demonstrate independence in HEP with increased ease of movement with less back pain, increased strength, awareness of proper body mechanics. Patient Education:   [x]  HEP/Education Completed: Plan of Care, Goals, 90/90 decompression positions for pain management. Core engagement, bed mobility using logroll technique. Use of HP/CP as needed in decompression 90/90 position   Medbridge Access Code:  []  No new Education completed  []  Reviewed Prior HEP      [x]  Patient verbalized and/or demonstrated understanding of education provided. []  Patient unable to verbalize and/or demonstrate understanding of education provided. Will continue education. []  Barriers to learning:     PLAN:  Treatment Recommendations: Strengthening, Range of Motion, Functional Mobility Training, Transfer Training, Neuromuscular Re-education, Manual Therapy - Soft Tissue Mobilization, Pain Management, Home Exercise Program, Patient Education, Aquatics and Modalities    [x]  Plan of care initiated. Plan to see patient 2 times per week for 8 weeks to address the treatment planned outlined above.   []  Continue with current plan of care  []  Modify plan of care as follows:    []  Hold pending physician visit  []  Discharge    Time In 0945   Time Out 1028   Timed Code Minutes: 10 min   Total Treatment Time: 43 min     Electronically Signed by: Tk Peterson PT

## 2022-02-04 ENCOUNTER — APPOINTMENT (OUTPATIENT)
Dept: PHYSICAL THERAPY | Age: 42
End: 2022-02-04
Payer: COMMERCIAL

## 2022-02-07 ENCOUNTER — HOSPITAL ENCOUNTER (OUTPATIENT)
Dept: PHYSICAL THERAPY | Age: 42
Setting detail: THERAPIES SERIES
Discharge: HOME | End: 2022-02-07
Payer: COMMERCIAL

## 2022-02-07 PROCEDURE — 97110 THERAPEUTIC EXERCISES: CPT

## 2022-02-07 RX ORDER — BUPROPION HYDROCHLORIDE 300 MG/1
TABLET ORAL
Qty: 30 TABLET | Refills: 3 | OUTPATIENT
Start: 2022-02-07

## 2022-02-07 NOTE — PROGRESS NOTES
[address] [facility]  PHYSICAL THERAPY  [] EVALUATION  [x] DAILY NOTE (LAND) [] DAILY NOTE (AQUATIC ) [] PROGRESS NOTE [] DISCHARGE NOTE    [x] OUTPATIENT REHABILITATION [facility]   [] [place] [address]    [] [facility]   [] Doreen Castaneday    Date: 2022  Patient Name:  Zhane Gonzales  : 1980  MRN: [id number]  CSN: [id number]    Referring Practitioner Pauline Aguilar DO   Diagnosis Other low back pain [M54.59]  Chronic pain syndrome [G89.4]    Treatment Diagnosis Lumbosacral pain with limited tolerance to sitting, forward flexion, stooping, squatting and extension of spine. Date of Evaluation 22    Additional Pertinent History History of RFA and injections to lumbar spine, 4 year history of chronic back pain, lumbar facet pain, chronic pain syndrome, anxiety/depression, HTN       Functional Outcome Measure Used Modified Oswestry   Functional Outcome Score 23/50 @ 46% (22)       Insurance: Primary: Payor: Joe Talavera /  /  / ,   Secondary:    Authorization Information: PATIENT RESPONSIBILITY AND/OR CO-PAY: $20 COPAY PER VISIT PLUS DEDUCTIBLE. SECONDARY INSURANCE COMPANY:  NONE       PRE CERTIFICATION REQUIRED: NO  INSURANCE THERAPY BENEFIT:  60 VISITS PT ONLY   PER PLAN YEAR,  TO   AQUATIC THERAPY COVERED: YES  MODALITIES COVERED:  YES  TELEHEALTH COVERED:    REFERENCE NUMBER: [id number]   Visit # 2, 2/10 for progress note   Visits Allowed: 60 visits   Recertification Date:    Physician Follow-Up: 4/3/2022   Physician Orders: PT evaluate/treat. History of Present Illness: Patient reports of 4 year history of back pain. Patient reports he fell off roof onto A frame ladder. Has had history of ablations and injections at his low back that have been helpful. He follows with Dr. Dana Davies in Pain Management. Patient reports of pain rating left side of low back to tailbone region.  Notes back pain aggravated with sitting >15 minutes, standing and walking okay. Noted difficulty with bending forward to tie shoes and place socks on. Notes can able to lay on his back with knees bent or straight. Uncomfortable with laying on his side. Can't lay on stomach either. Notes feels okay with laying on his back. On pain medications and Baclofen. Injections and ablations helping as well. Notes wearing off of the injections from 1 year ago. SUBJECTIVE: Patient states he has been feeling about the same and rates current pain 7/10. Patient states he did try to get in the position discussed at eval which did help with sleep. TREATMENT   Precautions: NO extension or flexion past 40 degrees. Decompression position in 90/90 best position for pain management. Pain: 7/10    X in shaded column indicates activity completed today   Modalities Parameters/  Location  Notes   Moist HP to lower back with exercises  x                Manual Therapy Time/Technique  Notes                     Exercise/Intervention   Notes   Supine in 90/90 decompression position   x    Discussion on core engagement and contraction of abdominals with decompression position, supine<>sit   x    Body mechanics using logroll technique for supine<>sit       Discussion/demonstration on spine model location of L5, S1 facets and lumbosacral region for regions of pain                     Ab brace 10x 5 sec x Cues for technique   pelvic tilt 10x 5 sec x Cues for technique   Ab brace with ball and band       Ab brace with marches                     HS stretch in 90/90 3x 20 sec x    Piriformis stretch 3x 20 sec x    LTR 10x 5 sec x    Supine T stretch 1x 1 min x                                                       Specific Interventions Next Treatment: 90/90 position decompression position with use of CP/HP. Core control exercises in supine hooklying, 90/90 position, seated and standing progression with strengthening. Body mechanics. US, soft tissue mobilizations. Activity/Treatment Tolerance:  []  Patient tolerated treatment well  []  Patient limited by fatigue  [x]  Patient limited by pain   []  Patient limited by medical complications  []  Other:     Assessment: Patient completed exercises as listed above working on flexibility and core strength with moist HP to lower back during treatment. Added abdominal brace, pelvic tilt, HS stretch in 90/90, piriformis stretch, LTR as well as supine T stretch. Cues provided for correct technique with ab brace and pelvic tilts today. Patient did have complaints of soreness throughout treatment but able to complete exercises. Will continue to progress as tolerated by patient per POC. Goals    Patient Goal: Patient would like less pain. Be able to tolerate his job better. Short Term Goals: 4 weeks  1. Patient to report of decreased pain levels 7-8/10 lumbosacral and SI levels to 3-4/10 with pain managed with decompression positions 90/90, improved sitting, standing tolerance. 2. Patient to demonstrate increased lumbar ROM in flexion with decreased gaurding due to back pain with fingertips from floor to mid shin level and increased ease to upright standing. 3. Patient to have increased awareness of optimal lumbar neutral position with increased core control, strength, with ability to complete up to 15 reps with series of exercises. 4. Patient to demonstrate proper body mechanics with improved functional mobility with sit<>stand, partial squats,lifting techniques. 5. Hamstring flexibility to 80 degrees SLR, with 90/90 position limited from 30 degrees to 10 degrees. Long Term Goals: 8 weeks  1. Modified Oswestry from 23/50 to 15/50  2. Patient to demonstrate independence in HEP with increased ease of movement with less back pain, increased strength, awareness of proper body mechanics. Patient Education:   [x]  HEP/Education Completed: Plan of Care, Goals, 90/90 decompression positions for pain management.  Core engagement, bed mobility using logroll technique. Use of HP/CP as needed in decompression 90/90 position   Medbridge Access Code:  []  No new Education completed  []  Reviewed Prior HEP      [x]  Patient verbalized and/or demonstrated understanding of education provided. []  Patient unable to verbalize and/or demonstrate understanding of education provided. Will continue education. []  Barriers to learning:     PLAN:  Treatment Recommendations: Strengthening, Range of Motion, Functional Mobility Training, Transfer Training, Neuromuscular Re-education, Manual Therapy - Soft Tissue Mobilization, Pain Management, Home Exercise Program, Patient Education, Aquatics and Modalities    []  Plan of care initiated. Plan to see patient 2 times per week for 8 weeks to address the treatment planned outlined above.   [x]  Continue with current plan of care  []  Modify plan of care as follows:    []  Hold pending physician visit  []  Discharge    Time In 0915   Time Out 0945   Timed Code Minutes: 30 min   Total Treatment Time: 30 min     Electronically Signed by: Ken Lu PTA

## 2022-02-09 ENCOUNTER — HOSPITAL ENCOUNTER (OUTPATIENT)
Dept: PHYSICAL THERAPY | Age: 42
Setting detail: THERAPIES SERIES
Discharge: HOME | End: 2022-02-09
Payer: COMMERCIAL

## 2022-02-09 PROCEDURE — 97110 THERAPEUTIC EXERCISES: CPT

## 2022-02-09 NOTE — PROGRESS NOTES
[address] [facility]  PHYSICAL THERAPY  [] EVALUATION  [x] DAILY NOTE (LAND) [] DAILY NOTE (AQUATIC ) [] PROGRESS NOTE [] DISCHARGE NOTE    [x] OUTPATIENT REHABILITATION CENTER [facility]   [] [place] [address]    [] [facility]   [] [place] [address]    Date: 2022  Patient Name:  Keon Vivar  : 1980  MRN: [id number]  CSN: [id number]    Referring Practitioner Shirley Avelar DO   Diagnosis Other low back pain [M54.59]  Chronic pain syndrome [G89.4]    Treatment Diagnosis Lumbosacral pain with limited tolerance to sitting, forward flexion, stooping, squatting and extension of spine. Date of Evaluation 22    Additional Pertinent History History of RFA and injections to lumbar spine, 4 year history of chronic back pain, lumbar facet pain, chronic pain syndrome, anxiety/depression, HTN       Functional Outcome Measure Used Modified Oswestry   Functional Outcome Score 23/50 @ 46% (22)       Insurance: Primary: Payor: Harman Hernandez /  /  / ,   Secondary:    Authorization Information: PATIENT RESPONSIBILITY AND/OR CO-PAY: $20 COPAY PER VISIT PLUS DEDUCTIBLE. SECONDARY INSURANCE COMPANY:  NONE       PRE CERTIFICATION REQUIRED: NO  INSURANCE THERAPY BENEFIT:  60 VISITS PT ONLY   PER PLAN YEAR,  TO   AQUATIC THERAPY COVERED: YES  MODALITIES COVERED:  YES  TELEHEALTH COVERED:    REFERENCE NUMBER: [id number]   Visit # 3, 3/10 for progress note   Visits Allowed: 60 visits   Recertification Date: 1908   Physician Follow-Up: 4/3/2022   Physician Orders: PT evaluate/treat. History of Present Illness: Patient reports of 4 year history of back pain. Patient reports he fell off roof onto A frame ladder. Has had history of ablations and injections at his low back that have been helpful. He follows with Dr. Antelmo Larsen in Pain Management. Patient reports of pain rating left side of low back to tailbone region.  Notes back pain aggravated with sitting >15 minutes, standing and walking okay. Noted difficulty with bending forward to tie shoes and place socks on. Notes can able to lay on his back with knees bent or straight. Uncomfortable with laying on his side. Can't lay on stomach either. Notes feels okay with laying on his back. On pain medications and Baclofen. Injections and ablations helping as well. Notes wearing off of the injections from 1 year ago. Adrienne Mckeon reports that pain levels remain 7/10 at his low back. Pain located on left side of his back>right. (lumbosacral to sacrum) Has been sitting longer due to recertification testing. He is able to get up and change position. Does only 90/90 position 1x per day, evening  20 minutes, Has maintained this position for up to 1 hour. Patient was able to complete some of his HEP with stretched and \"holding his abdomen in\" ex. Patient has not been taking any pain medication due to         TREATMENT   Precautions: NO extension or flexion past 40 degrees. Decompression position in 90/90 best position for pain management. Pain: 7/10    X in shaded column indicates activity completed today   Modalities Parameters/  Location  Notes   Moist HP to lower back with exercises  x                Manual Therapy Time/Technique  Notes                     Exercise/Intervention   Notes   Supine in 90/90 decompression position   x    Discussion on core engagement and contraction of abdominals with decompression position, supine<>sit   x    Body mechanics using logroll technique for supine<>sit       Discussion/demonstration on spine model location of L5, S1 facets and lumbosacral region for regions of pain                     Ab brace 10x 5 sec  Cues for technique   pelvic tilt 10x 5 sec x Cues for technique   Ab brace with ball and band 10x Green band x    Ab brace with marches              SKTC  3x 10 sec x    HS stretch in 90/90 3x 20 sec x Increased pain at right hip with stretch so stopped the stretch.     Piriformis techniques. 5. Hamstring flexibility to 80 degrees SLR, with 90/90 position limited from 30 degrees to 10 degrees. Long Term Goals: 8 weeks  1. Modified Oswestry from 23/50 to 15/50  2. Patient to demonstrate independence in HEP with increased ease of movement with less back pain, increased strength, awareness of proper body mechanics. Patient Education:   [x]  HEP/Education Completed: Plan of Care, Goals, 90/90 decompression positions for pain management. Core engagement, bed mobility using logroll technique. Use of HP/CP as needed in decompression 90/90 position. Pelvic tilts, hip abduction with resistance band in hooklying or seated position.  [employer] Access Code:  []  No new Education completed  []  Reviewed Prior HEP      [x]  Patient verbalized and/or demonstrated understanding of education provided. []  Patient unable to verbalize and/or demonstrate understanding of education provided. Will continue education. []  Barriers to learning:     PLAN:  Treatment Recommendations: Strengthening, Range of Motion, Functional Mobility Training, Transfer Training, Neuromuscular Re-education, Manual Therapy - Soft Tissue Mobilization, Pain Management, Home Exercise Program, Patient Education, Aquatics and Modalities    []  Plan of care initiated. Plan to see patient 2 times per week for 8 weeks to address the treatment planned outlined above.   [x]  Continue with current plan of care  []  Modify plan of care as follows:    []  Hold pending physician visit  []  Discharge    Time In 1131   Time Out 1203   Timed Code Minutes: 32   Total Treatment Time: 32     Electronically Signed by: Aaron Vee PT

## 2022-02-16 ENCOUNTER — HOSPITAL ENCOUNTER (OUTPATIENT)
Dept: PHYSICAL THERAPY | Age: 42
Setting detail: THERAPIES SERIES
Discharge: HOME | End: 2022-02-16
Payer: COMMERCIAL

## 2022-02-16 PROCEDURE — 97110 THERAPEUTIC EXERCISES: CPT

## 2022-02-16 NOTE — PROGRESS NOTES
[address] [facility]  PHYSICAL THERAPY  [] EVALUATION  [x] DAILY NOTE (LAND) [] DAILY NOTE (AQUATIC ) [] PROGRESS NOTE [] DISCHARGE NOTE    [x] OUTPATIENT REHABILITATION [facility]   [] [place] [address]    [] [facility]   [] [place]    Date: 2022  Patient Name:  Omar Preston  : 1980  MRN: [id number]  CSN: [id number]    Referring Practitioner Oumar Amin DO   Diagnosis Other low back pain [M54.59]  Chronic pain syndrome [G89.4]    Treatment Diagnosis Lumbosacral pain with limited tolerance to sitting, forward flexion, stooping, squatting and extension of spine. Date of Evaluation 22    Additional Pertinent History History of RFA and injections to lumbar spine, 4 year history of chronic back pain, lumbar facet pain, chronic pain syndrome, anxiety/depression, HTN       Functional Outcome Measure Used Modified Oswestry   Functional Outcome Score 23/50 @ 46% (22)       Insurance: Primary: Payor: Tom /  /  / ,   Secondary:    Authorization Information: PATIENT RESPONSIBILITY AND/OR CO-PAY: $20 COPAY PER VISIT PLUS DEDUCTIBLE. SECONDARY INSURANCE COMPANY:  NONE       PRE CERTIFICATION REQUIRED: NO  INSURANCE THERAPY BENEFIT:  60 VISITS PT ONLY   PER PLAN YEAR,  TO   AQUATIC THERAPY COVERED: YES  MODALITIES COVERED:  YES  TELEHEALTH COVERED:    REFERENCE NUMBER: [id number]   Visit # 4, 4/10 for progress note   Visits Allowed: 60 visits   Recertification Date:    Physician Follow-Up: 4/3/2022   Physician Orders: PT evaluate/treat. History of Present Illness: Patient reports of 4 year history of back pain. Patient reports he fell off roof onto A frame ladder. Has had history of ablations and injections at his low back that have been helpful. He follows with Dr. Earle Patten in Pain Management. Patient reports of pain rating left side of low back to tailbone region.  Notes back pain aggravated with sitting >15 minutes, standing and walking okay. Noted difficulty with bending forward to tie shoes and place socks on. Notes can able to lay on his back with knees bent or straight. Uncomfortable with laying on his side. Can't lay on stomach either. Notes feels okay with laying on his back. On pain medications and Baclofen. Injections and ablations helping as well. Notes wearing off of the injections from 1 year ago. SUBJECTIVE:Patient states 10/10 pain. Denies need for ED. Reports having a rough week and notes nothing making his back feel better. TREATMENT   Precautions: NO extension or flexion past 40 degrees. Decompression position in 90/90 best position for pain management. Pain: 7/10    X in shaded column indicates activity completed today   Modalities Parameters/  Location  Notes   Moist HP to lower back with exercises  x                Manual Therapy Time/Technique  Notes                     Exercise/Intervention   Notes   Supine in 90/90 decompression position       Discussion on core engagement and contraction of abdominals with decompression position, supine<>sit       Body mechanics using logroll technique for supine<>sit       Discussion/demonstration on spine model location of L5, S1 facets and lumbosacral region for regions of pain                     Ab brace 10x 5 sec x Cues for technique   pelvic tilt 10x 5 sec x Cues for technique   Ab brace with ball and band 10x Green band     Ab brace with marches x10  x           SKTC  3x 20 sec x    HS stretch in 90/90 3x 20 sec  Increased pain at right hip with stretch so stopped the stretch. Piriformis stretch 3x 20 sec x    LTR 10x 5 sec x    Supine T stretch 1x 1 min                                        /77 pulse 73. supinelying     Due to perspiring and face reddened during session. /78 pulse80 sitting          Specific Interventions Next Treatment: 90/90 position decompression position with use of CP/HP.  Core control exercises in supine hooklying, 90/90 position, seated and standing progression with strengthening. Body mechanics. US, soft tissue mobilizations. Activity/Treatment Tolerance:  []  Patient tolerated treatment well  []  Patient limited by fatigue  [x]  Patient limited by pain   []  Patient limited by medical complications  []  Other:     Assessment: Patient arrived 10 min late to treatment. Patient presented with difficulty completing therex d/t pain. Required several rest breaks throughout treatment. Unable to progress therex d/t patients pain level remaining consistent. Patient became emotional during treatment session and frustrated with LBP, states doctor follow up is in April. Ended treatment session on MHP in 90/90 decompression for pain management. Will continue to progress as tolerated by patient per POC. Goals    Patient Goal: Patient would like less pain. Be able to tolerate his job better. Short Term Goals: 4 weeks  1. Patient to report of decreased pain levels 7-8/10 lumbosacral and SI levels to 3-4/10 with pain managed with decompression positions 90/90, improved sitting, standing tolerance. 2. Patient to demonstrate increased lumbar ROM in flexion with decreased gaurding due to back pain with fingertips from floor to mid shin level and increased ease to upright standing. 3. Patient to have increased awareness of optimal lumbar neutral position with increased core control, strength, with ability to complete up to 15 reps with series of exercises. 4. Patient to demonstrate proper body mechanics with improved functional mobility with sit<>stand, partial squats,lifting techniques. 5. Hamstring flexibility to 80 degrees SLR, with 90/90 position limited from 30 degrees to 10 degrees. Long Term Goals: 8 weeks  1. Modified Oswestry from 23/50 to 15/50  2. Patient to demonstrate independence in HEP with increased ease of movement with less back pain, increased strength, awareness of proper body mechanics. Patient Education:   []  HEP/Education Completed: Plan of Care, Goals, 90/90 decompression positions for pain management. Core engagement, bed mobility using logroll technique. Use of HP/CP as needed in decompression 90/90 position. Pelvic tilts, hip abduction with resistance band in hooklying or seated position.  [employer] Access Code:  []  No new Education completed  []  Reviewed Prior HEP      [x]  Patient verbalized and/or demonstrated understanding of education provided. []  Patient unable to verbalize and/or demonstrate understanding of education provided. Will continue education. []  Barriers to learning:     PLAN:  Treatment Recommendations: Strengthening, Range of Motion, Functional Mobility Training, Transfer Training, Neuromuscular Re-education, Manual Therapy - Soft Tissue Mobilization, Pain Management, Home Exercise Program, Patient Education, Aquatics and Modalities    []  Plan of care initiated. Plan to see patient 2 times per week for 8 weeks to address the treatment planned outlined above.   [x]  Continue with current plan of care  []  Modify plan of care as follows:    []  Hold pending physician visit  []  Discharge    Time In 1040   Time Out 1112   Timed Code Minutes: 23   Total Treatment Time: 32     Electronically Signed by: Beverly Baxter PTA

## 2022-02-22 RX ORDER — PAROXETINE HYDROCHLORIDE 20 MG/1
TABLET, FILM COATED ORAL
Qty: 90 TABLET | Refills: 1 | Status: SHIPPED | OUTPATIENT
Start: 2022-02-22

## 2022-02-22 NOTE — TELEPHONE ENCOUNTER
This medication refill is regarding a electronic request.  Refill requested by [address] Requested Prescriptions     Pending Prescriptions Disp Refills    PARoxetine (PAXIL) 20 MG tablet [Pharmacy Med Name: PAROXETINE HCL 20 MG TABLET] 90 tablet 1     Sig: TAKE 1 TABLET BY MOUTH EVERY DAY     Date of last visit: 1/10/2022   Date of next visit: None  Date of last refill: 8/20/2021 #90/1    Rx verified, ordered and set to EP.

## 2022-02-23 ENCOUNTER — TELEPHONE (OUTPATIENT)
Dept: PHYSICAL MEDICINE AND REHAB | Age: 42
End: 2022-02-23

## 2022-02-25 ENCOUNTER — HOSPITAL ENCOUNTER (OUTPATIENT)
Dept: PHYSICAL THERAPY | Age: 42
Setting detail: THERAPIES SERIES
Discharge: HOME | End: 2022-02-25
Payer: COMMERCIAL

## 2022-02-25 PROCEDURE — 97110 THERAPEUTIC EXERCISES: CPT

## 2022-02-25 NOTE — PROGRESS NOTES
[facility]  PHYSICAL [facility]ERAPY  [] EVALUATION  [] DAILY NOTE (LAND) [] DAILY NOTE (AQUATIC ) [x] PROGRESS NOTE [] DISCHARGE NOTE    [x] [facility] [place]   [] [place] [address]    [] [facility]   [] [place]    Date: 2022  Patient Name:  Keon Vivar  : 1980  MRN: [id number]  CSN: [id number]    Referring Practitioner Shirley Avelar DO   Diagnosis Other low back pain [M54.59]  Chronic pain syndrome [G89.4]    Treatment Diagnosis Lumbosacral pain with limited tolerance to sitting, forward flexion, stooping, squatting and extension of spine. Date of Evaluation 22    Additional Pertinent History History of RFA and injections to lumbar spine, 4 year history of chronic back pain, lumbar facet pain, chronic pain syndrome, anxiety/depression, HTN       Functional Outcome Measure Used Modified Oswestry   Functional Outcome Score 23/50 @ 46% (22)   19/50 @ 38% 22 PN       Insurance: Primary: Payor: Harman Hernandez /  /  / ,   Secondary:    Authorization Information: PATIENT RESPONSIBILITY AND/OR CO-PAY: $20 COPAY PER VISIT PLUS DEDUCTIBLE. SECONDARY INSURANCE COMPANY:  NONE       PRE CERTIFICATION REQUIRED: NO  INSURANCE THERAPY BENEFIT:  60 VISITS PT ONLY   PER PLAN YEAR,  TO   AQUATIC THERAPY COVERED: YES  MODALITIES COVERED:  YES  TELEHEALTH COVERED:    REFERENCE NUMBER: [id number]   Visit # 5, 0/6 for progress note    Visits Allowed: 60 visits   Recertification Date: 3/29/2538   Physician Follow-Up: 3/3/2022   Physician Orders: PT evaluate/treat. History of Present Illness: Patient reports of 4 year history of back pain. Patient reports he fell off roof onto A frame ladder. Has had history of ablations and injections at his low back that have been helpful. He follows with Dr. Antelmo Larsen in Pain Management. Patient reports of pain rating left side of low back to tailbone region.  Notes back pain aggravated with stretch so stopped the stretch. Piriformis stretch 3x 20 sec     LTR 10x 5 sec x Did not tolerate. Supine T stretch 1x 1 min                                 Reassessment  15 minutes      /77 pulse 73. supinelying     Due to perspiring and face reddened during session. /78 pulse80 sitting          Specific Interventions Next Treatment: 90/90 position decompression position with use of CP/HP. Core control exercises in supine hooklying, 90/90 position, seated and standing progression with strengthening. Body mechanics. US, soft tissue mobilizations. Activity/Treatment Tolerance:  []  Patient tolerated treatment well  []  Patient limited by fatigue  [x]  Patient limited by pain   []  Patient limited by medical complications  []  Other:     Assessment: Reassessment today. Patient continues to have elevated pain ratings up to 9/10 and 10/10. Not tolerating therap ex for core and flexibility in PT. Has has attended sessions inconsistently with only 5 appointments since 1/28/2022. He is not progressing in therapy limited due to pain complaints. Will contact Dr. Dee Alvarez regarding lack of progress and continued elevated pain ratings. Will place patient on hold at this time until follow up with Dr. Dee Alvarez. Goals    Patient Goal: Patient would like less pain. Be able to tolerate his job better. Short Term Goals: 4 weeks  1. Patient to report of decreased pain levels 7-8/10 lumbosacral and SI levels to 3-4/10 with pain managed with decompression positions 90/90, improved sitting, standing tolerance. 1. GOAL NOT MET Pain levels are any where from 8/10 to 9/10 and sometimes 10/10.     2. Patient to demonstrate increased lumbar ROM in flexion with decreased gaurding due to back pain with fingertips from floor to mid shin level and increased ease to upright standing. GOAL NOT MET Patient only flexes 30 degrees at waist and noting fingertips only to upper thigh region from floor.  Major restriction. 3. Patient to have increased awareness of optimal lumbar neutral position with increased core control, strength, with ability to complete up to 15 reps with series of exercises. GOAL NOT MET Program has been difficult to progress due to elevated pain ratings as reported by patient and program is very basic for core work and flexibility    4. Patient to demonstrate proper body mechanics with improved functional mobility with sit<>stand, partial squats,lifting techniques. GOAL NOT MET sit<>stand patient tends to hold spine rigid and gaurded as well as with be mobility. Cues for proper bed mobility body mechanics    5. Hamstring flexibility to 80 degrees SLR, with 90/90 position limited from 30 degrees to 10 degrees. GOAL NOT MET SLR right 60 degrees, left 40 degrees,     Long Term Goals: 8 weeks  1. Modified Oswestry from 23/50 to 15/50  2. Patient to demonstrate independence in HEP with increased ease of movement with less back pain, increased strength, awareness of proper body mechanics. Patient Education:   []  HEP/Education Completed: Plan of Care, Goals, 90/90 decompression positions for pain management. Core engagement, bed mobility using logroll technique. Use of HP/CP as needed in decompression 90/90 position. Pelvic tilts, hip abduction with resistance band in hooklying or seated position.  [employer] Access Code:  []  No new Education completed  []  Reviewed Prior HEP      [x]  Patient verbalized and/or demonstrated understanding of education provided. []  Patient unable to verbalize and/or demonstrate understanding of education provided. Will continue education.   []  Barriers to learning:     PLAN:  Treatment Recommendations: Strengthening, Range of Motion, Functional Mobility Training, Transfer Training, Neuromuscular Re-education, Manual Therapy - Soft Tissue Mobilization, Pain Management, Home Exercise Program, Patient Education, Aquatics and Modalities    []  Plan of care initiated. Plan to see patient 2 times per week for 8 weeks to address the treatment planned outlined above.   []  Continue with current plan of care  []  Modify plan of care as follows:    [x]  Hold pending physician visit  []  Discharge    Time In 1117   Time Out 1147   Timed Code Minutes: 15   Total Treatment Time: 30     Electronically Signed by: Jose Kruger PT

## 2022-02-25 NOTE — TELEPHONE ENCOUNTER
Received call from therapist at [facility] that due to pt. Poor compliance/ improvement that they are holding further therapy till pt. Is seen in office with Dr. Teresa Conde. Called them back and confirmed that we have received thier update and no further decisions will be made till pt. Comes in for f/u.

## 2022-03-03 ENCOUNTER — OFFICE VISIT (OUTPATIENT)
Dept: PHYSICAL MEDICINE AND REHAB | Age: 42
End: 2022-03-03
Payer: COMMERCIAL

## 2022-03-03 VITALS
BODY MASS INDEX: 32.07 KG/M2 | HEART RATE: 91 BPM | HEIGHT: 70 IN | OXYGEN SATURATION: 96 % | WEIGHT: 224 LBS | SYSTOLIC BLOOD PRESSURE: 136 MMHG | DIASTOLIC BLOOD PRESSURE: 82 MMHG

## 2022-03-03 DIAGNOSIS — M79.18 MYOFASCIAL PAIN: ICD-10-CM

## 2022-03-03 DIAGNOSIS — M54.59 LUMBAR FACET JOINT PAIN: ICD-10-CM

## 2022-03-03 DIAGNOSIS — M47.816 LUMBAR SPONDYLOSIS: ICD-10-CM

## 2022-03-03 DIAGNOSIS — G89.4 CHRONIC PAIN SYNDROME: Primary | ICD-10-CM

## 2022-03-03 PROCEDURE — 99214 OFFICE O/P EST MOD 30 MIN: CPT | Performed by: ANESTHESIOLOGY

## 2022-03-03 RX ORDER — BACLOFEN 10 MG/1
TABLET ORAL
COMMUNITY
Start: 2022-02-18 | End: 2022-06-29

## 2022-03-03 RX ORDER — OXYCODONE HYDROCHLORIDE AND ACETAMINOPHEN 5; 325 MG/1; MG/1
1 TABLET ORAL 2 TIMES DAILY PRN
Qty: 60 TABLET | Refills: 0 | Status: SHIPPED | OUTPATIENT
Start: 2022-03-03 | End: 2022-04-02

## 2022-03-03 NOTE — PROGRESS NOTES
Chronic Pain/PM&R Clinic Note     Encounter Date: 3/3/22    Subjective:   Chief Complaint:   Chief Complaint   Patient presents with    Discuss Medications     Percocet       History of Present Illness:   Cyrus Nuñez is a 37 y.o. male seen in the clinic on 01/20/21 for his history of chronic low back pain. He has a medical history of anxiety/depression and diverticulitis. He has been dealing with low back pain for the last 4 years after he fell off a roof onto a ladder. He describes his pain is primarily in the axial back with some radiation down into bilateral buttocks. He rates his pain a constant 7/10 aching, stabbing pain. His pain is worse when he is upright or doing any activities particularly working. He works as a  . His pain is improved with rest.  He denies any focal leg weakness, new paresthesias, saddle anesthesia, bowel/bladder incontinence. He states his pain is interfering with his everyday activities including work and sleep. Of note, he was previously seen Dr. Filipe Garvey and underwent diagnostic lumbar medial branch blocks on 11/18/2020 in which he reports benefit for a couple hours. He was previously taking tramadol and Flexeril with no benefit. He underwent physical therapy within the last year with no benefit as well. Today, 3/3/2022, patient presents for planned follow-up for chronic low back pain. He was unable to participate in his physical therapy due to worsening low back pain. He continues to describe pain in his low back without any radiation. He continues to work with his primary care team up with his depression and feels like this has been doing better overall for him. He would like to get back on the Percocet as he felt like these were the most effective especially getting him through the workday. He denies any new leg paresthesias, focal weakness, saddle anesthesia, bowel/bladder incontinence.     History of Interventions:   Surgery: No previous back surgeries  Injections: Lumbar MBBs (11/18/20) - noted benefit  Diagnostic and confirmatory lumbar MBBs (2/23/21 and 3/23/21) - >80% relief and 100% relief respectively  Lumbar RFA (4/20/2021, 5/25/2021)significant relief    Current Treatment Medications:   Paxil - anxiety/depression   Wellbutrin - anxiety/depression  Baclofen 10 mg 3 times daily as needed    Historical Treatment Medications:   NSAIDs - unable to take due to diverticulitis  Flexeril - no benefit  Tramadol - no benefit  Gabapentin - unable to tolerate (worsening anxiety and RLS)  Lyrica - unable to tolerate (worsening anxiety and RLS)  Norcoineffective    Imaging:  MRI L-spine (7/14/20)        Past Medical History:   Diagnosis Date    Chronic back pain     Depression     GERD (gastroesophageal reflux disease)     History of high blood pressure     Hypertension        Past Surgical History:   Procedure Laterality Date    COLONOSCOPY  12/2020    WNL    PAIN MANAGEMENT PROCEDURE Bilateral 11/18/2020    L3, L4 medial branch and L5 dorsal rami injection, bilateral performed [facility]hal[facility] Bilateral 2/23/2021    medial branch blocks at bilateral L4/L5 and L5/S1 performed by [facility], DO at [facility] 113 Bilateral 3/23/2021    confirmatory medial branch blocks at bilateral L4/L5 and L5/S1 performed by [facility], DO at [facility] 113 Right 4/20/2021    the option of thermal radiofrequency ablation at RIGHT medial branches at L4/L5 and L5/S1 performed by [facility] Left 5/25/2021    thermal radiofrequency ablation at LEFT medial branches L4/L5 and L5/S1 [facility] at [address] [place] [address]       History reviewed. No pertinent family history.     Social History     Socioeconomic History    Marital status:      Spouse name: Timothy Garcia Number of children: Not on file    Years of education: Not on file    Highest education level: Not on file   Occupational History    Not on file   Tobacco Use    Smoking status: Current Every Day Smoker     Packs/day: 1.00     Types: Cigarettes    Smokeless tobacco: Never Used   Vaping Use    Vaping Use: Never used   Substance and Sexual Activity    Alcohol use: Never    Drug use: Never    Sexual activity: Not on file   Other Topics Concern    Not on file   Social History Narrative    Not on file     Social Determinants of Health     Financial Resource Strain: Unknown    Difficulty of Paying Living Expenses: Patient refused   Food Insecurity: Unknown    Worried About [address] in the Last Year: Patient refused    [email or web address] [employer] in the Last Year: Patient refused   Transportation Needs:     Lack of Transportation (Medical): Not on file    Lack of Transportation (Non-Medical):  Not on file   Physical Activity:     Days of Exercise per Week: Not on file    Minutes of Exercise per Session: Not on file   Stress:     Feeling of Stress : Not on file   Social Connections:     Frequency of Communication with Friends and Family: Not on file    Frequency of Social Gatherings with Friends and Family: Not on file    Attends Congregation Services: Not on file    Active Member of [address] [place] [address] or Organizations: Not on file    Attends Club or Organization Meetings: Not on file    Marital Status: Not on file   Intimate Partner Violence:     Fear of Current or Ex-Partner: Not on file    Emotionally Abused: Not on file    Physically Abused: Not on file    Sexually Abused: Not on file   Housing Stability:     Unable to Pay for Housing in the Last Year: Not on file    Number of [place] in the Last Year: Not on file    Unstable Housing in the Last Year: Not on file       Medications & Allergies:   Current Outpatient Medications   Medication Instructions    baclofen (LIORESAL) 10 MG tablet TAKE 1 TABLET BY MOUTH THREE TIMES DAILY AS NEEDED FOR MUSCLE SPASMS    buPROPion (WELLBUTRIN XL) 300 mg, Oral, EVERY MORNING    hydrOXYzine (VISTARIL) 50 MG capsule TAKE 1 CAPSULE BY MOUTH THREE TIMES DAILY AS NEEDED FOR ANXIETY    lisinopril (PRINIVIL;ZESTRIL) 10 mg, Oral, DAILY    oxyCODONE-acetaminophen (PERCOCET) 5-325 MG per tablet 1 tablet, Oral, 2 TIMES DAILY PRN    pantoprazole (PROTONIX) 40 mg, Oral, 2 TIMES DAILY BEFORE MEALS    PARoxetine (PAXIL) 20 MG tablet TAKE 1 TABLET BY MOUTH EVERY DAY    sildenafil (VIAGRA) 50 mg, Oral, DAILY PRN    tadalafil (CIALIS) 10 mg, Oral, DAILY PRN       No Known Allergies    Review of Systems:   Constitutional: negative for weight changes or fevers  Genitourinary: negative for bowel/bladder incontinence   Musculoskeletal: positive for low back pain  Neurological: negative for any leg weakness or numbness/tingling  Behavioral/Psych:  Positive for anxiety/depression   All other systems reviewed and are negative    Objective:     Vitals:    03/03/22 1355   BP: 136/82   Pulse: 91   SpO2: 96%       Constitutional: Pleasant, no acute distress   Head: Normocephalic, atraumatic   Eyes: Conjunctivae normal   Neck: Supple, symmetrical   Lungs: Normal respiratory effort, non-labored breathing   Cardiovascular: Limbs warm and well perfused   Abdomen: Non-protruded   Musculoskeletal: Muscle bulk symmetric, no atrophy, no gross deformities   · Lumbar Spine: Lumbar paraspinals tender bilaterally. SLR neg bilaterally. Positive facet loading bilaterally. Neurological: Cranial nerves II-XII grossly intact. · Gait - Slightly antalgic gait. Ambulates without assist device. · Motor: No focal motor deficit  Skin: No rashes or lesions visible in low back  Psychological: Cooperative, no exaggerated pain behaviors    Assessment:    Diagnosis Orders   1. Chronic pain syndrome  oxyCODONE-acetaminophen (PERCOCET) 5-325 MG per tablet   2.  Lumbar facet joint pain     3. Myofascial pain     4. Lumbar spondylosis           Lynda Rocha is a 37 y.o.male presenting to the pain clinic for evaluation of chronic low back pain. His clinical history and physical examination are consistent with lumbar facet mediated pain and associated myofascial pain. I have set him up for diagnostic lumbar medial branch blocks targeting the facet joints of L4/L5 and L5/S1 bilaterally. I have restarted his Percocet to help get him through physical therapy. We will have a 2-week follow-up with Cristina Bang to also monitor him on this medication. If he fails to show up to a physical therapy appointment or cancels a physical therapy appointment he will be dismissed from practice immediately. Plan: The following treatment recommendations and plan were discussed in detail with Lynda Rocha. Imaging:   I have reviewed patients imaging of MRI L-spine (report). Analgesics:   I have restarted the patient on low-dose Percocet 5 mg she can take up to 2 times a day as needed for severe pain (pain greater than 7/10). If patient no-shows or cancels any of his physical therapy appointments he will be dismissed from our practice immediately. He can preemptively take his pain medication prior to his physical therapy appointments. Patient should take this medication as prescribed is taking more than prescribed you may develop tolerance, physical dependence, addiction. OARRS reviewed and is appropriate. Pain contract signed. Patient is taking Acetaminophen. Patient informed that the maximum amount of acetaminophen taken on a regular basis should only be 4000 mg per day. Adjuvants:   Patient can continue baclofen 10 mg 3 times a day as needed. Interventions:   No interventions pursued at this visit. Anticoagulation/NPO Recommendations:   No interventions pursued at this visit.     Multidisciplinary Pain Management:   In the presence of complex, chronic, and multi-factorial pain, the importance of a multidisciplinary approach to pain management in the patients management regimen was emphasized and discussed in great detail.    PHYSICAL THERAPY: Patient will need to be staying compliant with his physical therapy sessions    Referrals:  None    Prescriptions Written This Visit:   Percocet 5 mg (#60, 0 refills)    Follow-up: 2 weeks (with Geovanny Richardson)      Isaías Summers,   Interventional Pain Management/PM&R   [place]

## 2022-03-11 ENCOUNTER — HOSPITAL ENCOUNTER (OUTPATIENT)
Dept: PHYSICAL THERAPY | Age: 42
Setting detail: THERAPIES SERIES
Discharge: HOME | End: 2022-03-11
Payer: COMMERCIAL

## 2022-03-11 PROCEDURE — 97110 THERAPEUTIC EXERCISES: CPT

## 2022-03-11 NOTE — PROGRESS NOTES
[facility]  PHYSICAL THERAPY  [] EVALUATION  [x] DAILY NOTE (LAND) [] DAILY NOTE (AQUATIC ) [] PROGRESS NOTE [] DISCHARGE NOTE    [x] OUTPATIENT REHABILITATION [facility]   [] [place] [address]    [] [facility]   [] Bettye Sharprose    Date: 3/11/2022  Patient Name:  Mercedes Trivedi  : 1980  MRN: [id number]  CSN: [id number]    Referring Practitioner Krysta Wan DO   Diagnosis Other low back pain [M54.59]  Chronic pain syndrome [G89.4]    Treatment Diagnosis Lumbosacral pain with limited tolerance to sitting, forward flexion, stooping, squatting and extension of spine. Date of Evaluation 22    Additional Pertinent History History of RFA and injections to lumbar spine, 4 year history of chronic back pain, lumbar facet pain, chronic pain syndrome, anxiety/depression, HTN       Functional Outcome Measure Used Modified Oswestry   Functional Outcome Score 23/50 @ 46% (22)   19/50 @ 38% 22 PN       Insurance: Primary: Payor: [employer] [facility]. /  /  / ,   Secondary:    Authorization Information: PATIENT RESPONSIBILITY AND/OR CO-PAY: $20 COPAY PER VISIT PLUS DEDUCTIBLE. SECONDARY INSURANCE COMPANY:  NONE       PRE CERTIFICATION REQUIRED: NO  INSURANCE THERAPY BENEFIT:  60 VISITS PT ONLY   PER PLAN YEAR,  TO   AQUATIC THERAPY COVERED: YES  MODALITIES COVERED:  YES  TELEHEALTH COVERED:    REFERENCE NUMBER: [id number]   Visit # 6,  for progress note    Visits Allowed: 60 visits   Recertification Date: [id number]   Physician Follow-Up: 3/17/2022   Physician Orders: PT evaluate/treat. History of Present Illness: Patient reports of 4 year history of back pain. Patient reports he fell off roof onto A frame ladder. Has had history of ablations and injections at his low back that have been helpful. He follows with Dr. Radha Lynne in Pain Management. Patient reports of pain rating left side of low back to tailbone region.  Notes back pain aggravated with sitting >15 minutes, standing and walking okay. Noted difficulty with bending forward to tie shoes and place socks on. Notes can able to lay on his back with knees bent or straight. Uncomfortable with laying on his side. Can't lay on stomach either. Notes feels okay with laying on his back. On pain medications and Baclofen. Injections and ablations helping as well. Notes wearing off of the injections from 1 year ago. SUBJECTIVE:Patient rates his pain at a 5/10 at left low back region/flank region. Patient states he followed up with Dr. Matt Bermudez and told him to continue with PT. He was place on pain medication, Percoset. He states that the pain medication is helping. He states he may be scheduled for nerve ablations in April. TREATMENT   Precautions: NO extension or flexion past 40 degrees. Decompression position in 90/90 best position for pain management. Pain: 5/10    X in shaded column indicates activity completed today   Modalities Parameters/  Location  Notes   Moist HP to lower back with exercises  x    Cold pack to lower back concurrent with exercises  x          Manual Therapy Time/Technique  Notes                     Exercise/Intervention   Notes   Supine in 90/90 decompression position       Discussion on core engagement and contraction of abdominals with decompression position, supine<>sit       Body mechanics using logroll technique for supine<>sit       Discussion/demonstration on spine model location of L5, S1 facets and lumbosacral region for regions of pain                     Ab brace 10x 5 sec  Cues for technique   pelvic tilt 10x 5 sec x Cues for technique   Ab brace with ball and band 10x blue band x    Ab brace with marches x10  x           SKTC RLE/ heelslides with slight hip flexion on LLE 3x,5x  20 sec x    HS stretch in 90/90 3x 20 sec   . Piriformis stretch 3x 20 sec     LTR 10x 5 sec  Did not tolerate.            Seated ankle pumps with abdominal bracing 10x  x    UE horizontal abduction: abdominal bracing in seated position 10x Blue band x    ROWS with blue band 10x Blue band x           Reassessment  15 minutes      /77 pulse 73. supinelying     Due to perspiring and face reddened during session. /78 pulse80 sitting          Specific Interventions Next Treatment: 90/90 position decompression position with use of CP/HP. Core control exercises in supine hooklying, 90/90 position, seated and standing progression with strengthening. Body mechanics. US, soft tissue mobilizations. Activity/Treatment Tolerance:  []  Patient tolerated treatment well  []  Patient limited by fatigue  [x]  Patient limited by pain   []  Patient limited by medical complications  []  Other:     Assessment:Patient showed up 10 minutes late for PT appointment. Pain levels are lower at 5/10. Progressed to seated stabilization ex with UE resistance band. Tolerated supine in hooklying core work better today. Note patient's face turns red with exercises and perspires during session with exercises. Pain level remained 5/10 but noted less tension at back muscles by end of session. \"less pressure in back noted\"   Goals    Patient Goal: Patient would like less pain. Be able to tolerate his job better. Short Term Goals: 4 weeks  1. Patient to report of decreased pain levels 7-8/10 lumbosacral and SI levels to 3-4/10 with pain managed with decompression positions 90/90, improved sitting, standing tolerance. 1. GOAL NOT MET Pain levels are any where from 8/10 to 9/10 and sometimes 10/10.     2. Patient to demonstrate increased lumbar ROM in flexion with decreased gaurding due to back pain with fingertips from floor to mid shin level and increased ease to upright standing. GOAL NOT MET Patient only flexes 30 degrees at waist and noting fingertips only to upper thigh region from floor. Major restriction.      3. Patient to have increased awareness of optimal lumbar neutral position with increased core control, strength, with ability to complete up to 15 reps with series of exercises. GOAL NOT MET Program has been difficult to progress due to elevated pain ratings as reported by patient and program is very basic for core work and flexibility    4. Patient to demonstrate proper body mechanics with improved functional mobility with sit<>stand, partial squats,lifting techniques. GOAL NOT MET sit<>stand patient tends to hold spine rigid and gaurded as well as with be mobility. Cues for proper bed mobility body mechanics    5. Hamstring flexibility to 80 degrees SLR, with 90/90 position limited from 30 degrees to 10 degrees. GOAL NOT MET SLR right 60 degrees, left 40 degrees,     Long Term Goals: 8 weeks  1. Modified Oswestry from 23/50 to 15/50  2. Patient to demonstrate independence in HEP with increased ease of movement with less back pain, increased strength, awareness of proper body mechanics. Patient Education:    [x]  HEP/Education Completed: Plan of Care, Goals,Use cold pack for back pain. Continue supine in hooklying core exercises. Add horizontal abduction/adduction in seated position with band      Medbridge Access Code:  []  No new Education completed  []  Reviewed Prior HEP      [x]  Patient verbalized and/or demonstrated understanding of education provided. []  Patient unable to verbalize and/or demonstrate understanding of education provided. Will continue education. []  Barriers to learning:     PLAN:  Treatment Recommendations: Strengthening, Range of Motion, Functional Mobility Training, Transfer Training, Neuromuscular Re-education, Manual Therapy - Soft Tissue Mobilization, Pain Management, Home Exercise Program, Patient Education, Aquatics and Modalities    []  Plan of care initiated. Plan to see patient 2 times per week for 8 weeks to address the treatment planned outlined above.   []  Continue with current plan of care  []  Modify plan of care as follows:    [x]  Hold pending

## 2022-03-14 ENCOUNTER — HOSPITAL ENCOUNTER (OUTPATIENT)
Dept: PHYSICAL THERAPY | Age: 42
Setting detail: THERAPIES SERIES
Discharge: HOME | End: 2022-03-14
Payer: COMMERCIAL

## 2022-03-14 PROCEDURE — 97110 THERAPEUTIC EXERCISES: CPT

## 2022-03-14 NOTE — PROGRESS NOTES
[address] [facility]  PHYSICAL THERAPY  [] EVALUATION  [x] DAILY NOTE (LAND) [] DAILY NOTE (AQUATIC ) [] PROGRESS NOTE [] DISCHARGE NOTE    [x] OUTPATIENT REHABILITATION CENTER [facility]   [] [facility]    [] [facility]   [] Brad Lazo    Date: 3/14/2022  Patient Name:  Lucita Sanders   : 1980  MRN: [id number]  CSN: [id number]    Referring Practitioner Alley Chan DO   Diagnosis Other low back pain [M54.59]  Chronic pain syndrome [G89.4]    Treatment Diagnosis Lumbosacral pain with limited tolerance to sitting, forward flexion, stooping, squatting and extension of spine. Date of Evaluation 22    Additional Pertinent History History of RFA and injections to lumbar spine, 4 year history of chronic back pain, lumbar facet pain, chronic pain syndrome, anxiety/depression, HTN       Functional Outcome Measure Used Modified Oswestry   Functional Outcome Score 23/50 @ 46% (22)   19/50 @ 38% 22 PN       Insurance: Primary: Payor: Alondra Pugh /  /  / ,   Secondary:    Authorization Information: PATIENT RESPONSIBILITY AND/OR CO-PAY: $20 COPAY PER VISIT PLUS DEDUCTIBLE. SECONDARY INSURANCE COMPANY:  NONE       PRE CERTIFICATION REQUIRED: NO  INSURANCE THERAPY BENEFIT:  60 VISITS PT ONLY   PER PLAN YEAR,  TO   AQUATIC THERAPY COVERED: YES  MODALITIES COVERED:  YES  TELEHEALTH COVERED:    REFERENCE NUMBER: [id number]   Visit # 7,  for progress note    Visits Allowed: 60 visits   Recertification Date:    Physician Follow-Up: 3/17/2022   Physician Orders: PT evaluate/treat. History of Present Illness: Patient reports of 4 year history of back pain. Patient reports he fell off roof onto A frame ladder. Has had history of ablations and injections at his low back that have been helpful. He follows with Dr. Clara Thomas in Pain Management. Patient reports of pain rating left side of low back to tailbone region.  Notes back pain aggravated with sitting >15 minutes, standing and walking okay. Noted difficulty with bending forward to tie shoes and place socks on. Notes can able to lay on his back with knees bent or straight. Uncomfortable with laying on his side. Can't lay on stomach either. Notes feels okay with laying on his back. On pain medications and Baclofen. Injections and ablations helping as well. Notes wearing off of the injections from 1 year ago. SUBJECTIVE: Patient reports that he is having more pain at the left side of his back today compared to his right side. He rates his pain a 5/10. He reports that he has been performing his exercises \"a little bit\" at home. Objective:   TREATMENT   Precautions: NO extension or flexion past 40 degrees. Decompression position in 90/90 best position for pain management. Pain: 5/10 lower back pain L>R    X in shaded column indicates activity completed today   Modalities Parameters/  Location  Notes   Moist HP to lower back with exercises      Cold pack to lower back concurrent with exercises  X          Manual Therapy Time/Technique  Notes                     Exercise/Intervention   Notes   Supine in 90/90 decompression position       Discussion on core engagement and contraction of abdominals with decompression position, supine<>sit       Body mechanics using logroll technique for supine<>sit       Discussion/demonstration on spine model location of L5, S1 facets and lumbosacral region for regions of pain              Mat exercises: Ab brace 10x 5 sec X Cues for technique   pelvic tilt 12x 5 sec X Cues for technique   Ab brace with ball and band 10x each  Blue band X    Ab brace with marches x10  X           SKTC RLE/ heelslides with slight hip flexion on LLE 3x,5x  20 sec X Bilateral today, 3 reps SKTC, 5 reps heel slides    HS stretch in 90/90 3x 20 sec   . Piriformis stretch 3x 20 sec     LTR 10x 5 sec  Did not tolerate.            Seated:        Seated ankle pumps with abdominal bracing 10x  X Performed HR/TR today   UE horizontal abduction: abdominal bracing in seated position 10x Blue band X    ROWS with blue band 10x Blue band X    Shoulder extension with blue band with abdominal bracing  10x Blue band  X Noted tension relieving at his back while performing    Abdominal bracing with LAQ  10x  X 8 reps only on left side due to increasing lower back pain with increased reps. Reassessment  15 minutes      /77 pulse 73. supinelying     Due to perspiring and face reddened during session. /78 pulse80 sitting          Specific Interventions Next Treatment: 90/90 position decompression position with use of CP/HP. Core control exercises in supine hooklying, 90/90 position, seated and standing progression with strengthening. Body mechanics. US, soft tissue mobilizations. Activity/Treatment Tolerance:  []  Patient tolerated treatment well  []  Patient limited by fatigue  [x]  Patient limited by pain   []  Patient limited by medical complications  []  Other:     Assessment: Patient completed therapeutic exercises as documented above with addition to seated shoulder extension and LAQ exercises. He was provided with demonstration and cues on proper technique with added exercises to ensure maximal muscle activation. Cues also provided for maintaining abdominal bracing throughout session while performing exercises. Continued with use of cold pack during supine exercises to assist with decreasing patient's pain. He noted increased lower back pain with increased reps of LAQ exercise at his LLE. He denied any increase in his pain with LAQ at his RLE. Goals    Patient Goal: Patient would like less pain. Be able to tolerate his job better. Short Term Goals: 4 weeks  1. Patient to report of decreased pain levels 7-8/10 lumbosacral and SI levels to 3-4/10 with pain managed with decompression positions 90/90, improved sitting, standing tolerance.     2. Patient to demonstrate increased lumbar ROM in flexion with decreased gaurding due to back pain with fingertips from floor to mid shin level and increased ease to upright standing. 3. Patient to have increased awareness of optimal lumbar neutral position with increased core control, strength, with ability to complete up to 15 reps with series of exercises. 4. Patient to demonstrate proper body mechanics with improved functional mobility with sit<>stand, partial squats,lifting techniques. 5. Hamstring flexibility to 80 degrees SLR, with 90/90 position limited from 30 degrees to 10 degrees. Long Term Goals: 8 weeks  1. Modified Oswestry from 23/50 to 15/50  2. Patient to demonstrate independence in HEP with increased ease of movement with less back pain, increased strength, awareness of proper body mechanics. Patient Education:   [x]  HEP/Education Completed: Continue HEP at home, correct exercise technique, added exercises, monitoring response to additional exercises.  [employer] Access Code:  []  No new Education completed  [x]  Reviewed Prior HEP      [x]  Patient verbalized and/or demonstrated understanding of education provided. []  Patient unable to verbalize and/or demonstrate understanding of education provided. Will continue education. []  Barriers to learning:     PLAN:  Treatment Recommendations: Strengthening, Range of Motion, Functional Mobility Training, Transfer Training, Neuromuscular Re-education, Manual Therapy - Soft Tissue Mobilization, Pain Management, Home Exercise Program, Patient Education, Aquatics and Modalities    []  Plan of care initiated. Plan to see patient 2 times per week for 8 weeks to address the treatment planned outlined above.   [x]  Continue with current plan of care  []  Modify plan of care as follows:    []  Hold pending physician visit  []  Discharge    Time In 1336   Time Out 1414   Timed Code Minutes: 38 min   Total Treatment Time: 38 min     Electronically Signed by: Balwinder Tomas, PTA

## 2022-03-16 ENCOUNTER — HOSPITAL ENCOUNTER (OUTPATIENT)
Dept: PHYSICAL THERAPY | Age: 42
Setting detail: THERAPIES SERIES
Discharge: HOME | End: 2022-03-16
Payer: COMMERCIAL

## 2022-03-16 PROCEDURE — 97110 THERAPEUTIC EXERCISES: CPT

## 2022-03-16 NOTE — PROGRESS NOTES
[address] [facility]  PHYSICAL THERAPY  [] EVALUATION  [x] DAILY NOTE (LAND) [] DAILY NOTE (AQUATIC ) [] PROGRESS NOTE [] DISCHARGE NOTE    [x] OUTPATIENT REHABILITATION [facility]   [] [place] [address]    [] [facility]   [] Lesli Matthews    Date: 3/16/2022  Patient Name:  Olimpia Richard   : 1980  MRN: [id number]  CSN: [id number]    Referring Practitioner Adriana Barragan DO   Diagnosis Other low back pain [M54.59]  Chronic pain syndrome [G89.4]    Treatment Diagnosis Lumbosacral pain with limited tolerance to sitting, forward flexion, stooping, squatting and extension of spine. Date of Evaluation 22    Additional Pertinent History History of RFA and injections to lumbar spine, 4 year history of chronic back pain, lumbar facet pain, chronic pain syndrome, anxiety/depression, HTN       Functional Outcome Measure Used Modified Oswestry   Functional Outcome Score 23/50 @ 46% (22)   19/50 @ 38% 22 PN       Insurance: Primary: Payor: Casimiro Aponte /  /  / ,   Secondary:    Authorization Information: PATIENT RESPONSIBILITY AND/OR CO-PAY: $20 COPAY PER VISIT PLUS DEDUCTIBLE. SECONDARY INSURANCE COMPANY:  NONE       PRE CERTIFICATION REQUIRED: NO  INSURANCE THERAPY BENEFIT:  60 VISITS PT ONLY   PER PLAN YEAR,  TO   AQUATIC THERAPY COVERED: YES  MODALITIES COVERED:  YES  TELEHEALTH COVERED:    REFERENCE NUMBER: [id number]   Visit # 8, 3/6 for progress note    Visits Allowed: 60 visits   Recertification Date: 3/04/1690   Physician Follow-Up: 3/17/2022   Physician Orders: PT evaluate/treat. History of Present Illness: Patient reports of 4 year history of back pain. Patient reports he fell off roof onto A frame ladder. Has had history of ablations and injections at his low back that have been helpful. He follows with Dr. Kusum Thakur in Pain Management. Patient reports of pain rating left side of low back to tailbone region.  Notes back pain aggravated with sitting >15 minutes, standing and walking okay. Noted difficulty with bending forward to tie shoes and place socks on. Notes can able to lay on his back with knees bent or straight. Uncomfortable with laying on his side. Can't lay on stomach either. Notes feels okay with laying on his back. On pain medications and Baclofen. Injections and ablations helping as well. Notes wearing off of the injections from 1 year ago. SUBJECTIVE: Patient rates pain 6/10 bilat. Notes working too hard at work and had to work the late shift. Reports debating on taking time off work and focus on therapy to recover. Objective:   TREATMENT   Precautions: NO extension or flexion past 40 degrees. Decompression position in 90/90 best position for pain management. Pain: 5/10 lower back pain L>R    X in shaded column indicates activity completed today   Modalities Parameters/  Location  Notes   Moist HP to lower back with exercises      Cold pack to lower back concurrent with exercises  X          Manual Therapy Time/Technique  Notes                     Exercise/Intervention   Notes   Supine in 90/90 decompression position       Discussion on core engagement and contraction of abdominals with decompression position, supine<>sit       Body mechanics using logroll technique for supine<>sit       Discussion/demonstration on spine model location of L5, S1 facets and lumbosacral region for regions of pain              Mat exercises: Ab brace 12x 5 sec X Cues for technique   pelvic tilt 12x 5 sec X Cues for technique   Ab brace with ball and band 12x each  Blue band X Added unilateral fall out   Ab brace with marches x12  X           SKTC RLE/ heelslides with slight hip flexion on LLE 3x,5x  20 sec X Bilateral today, 3 reps SKTC, 5 reps heel slides    HS stretch in 90/90 3x 20 sec   . Piriformis stretch 3x 20 sec     LTR 10x 5 sec  Did not tolerate.            Seated:        Seated ankle pumps with abdominal bracing 12x  X Performed HR/TR today   UE horizontal abduction: abdominal bracing in seated position 10x Blue band X    ROWS with blue band 10x Blue band X    Shoulder extension with blue band with abdominal bracing  10x Blue band  X Noted tension relieving at his back while performing    Abdominal bracing with LAQ  10x   Held d/t increase in pain          Reassessment  15 minutes      /77 pulse 73. supinelying     Due to perspiring and face reddened during session. /78 pulse80 sitting          Specific Interventions Next Treatment: 90/90 position decompression position with use of CP/HP. Core control exercises in supine hooklying, 90/90 position, seated and standing progression with strengthening. Body mechanics. US, soft tissue mobilizations. Activity/Treatment Tolerance:  []  Patient tolerated treatment well  []  Patient limited by fatigue  [x]  Patient limited by pain   []  Patient limited by medical complications  []  Other:     Assessment: Continued with therex as stated above with increased reps as tolerated by patient. Patient completed exercises with slow pace. Patient states slight relief in pressure after completion of supine exercises. Will continue to progress as tolerated by patient per POC. Goals    Patient Goal: Patient would like less pain. Be able to tolerate his job better. Short Term Goals: 4 weeks  1. Patient to report of decreased pain levels 7-8/10 lumbosacral and SI levels to 3-4/10 with pain managed with decompression positions 90/90, improved sitting, standing tolerance. 2. Patient to demonstrate increased lumbar ROM in flexion with decreased gaurding due to back pain with fingertips from floor to mid shin level and increased ease to upright standing. 3. Patient to have increased awareness of optimal lumbar neutral position with increased core control, strength, with ability to complete up to 15 reps with series of exercises.     4. Patient to demonstrate proper body mechanics with improved functional mobility with sit<>stand, partial squats,lifting techniques. 5. Hamstring flexibility to 80 degrees SLR, with 90/90 position limited from 30 degrees to 10 degrees. Long Term Goals: 8 weeks  1. Modified Oswestry from 23/50 to 15/50  2. Patient to demonstrate independence in HEP with increased ease of movement with less back pain, increased strength, awareness of proper body mechanics. Patient Education:   [x]  HEP/Education Completed: Continue HEP at home, correct exercise technique, added exercises, monitoring response to additional exercises.  [employer] Access Code:  []  No new Education completed  [x]  Reviewed Prior HEP      [x]  Patient verbalized and/or demonstrated understanding of education provided. []  Patient unable to verbalize and/or demonstrate understanding of education provided. Will continue education. []  Barriers to learning:     PLAN:  Treatment Recommendations: Strengthening, Range of Motion, Functional Mobility Training, Transfer Training, Neuromuscular Re-education, Manual Therapy - Soft Tissue Mobilization, Pain Management, Home Exercise Program, Patient Education, Aquatics and Modalities    []  Plan of care initiated. Plan to see patient 2 times per week for 8 weeks to address the treatment planned outlined above.   [x]  Continue with current plan of care  []  Modify plan of care as follows:    []  Hold pending physician visit  []  Discharge    Time In 1300   Time Out 1340   Timed Code Minutes: 40   Total Treatment Time: 40     Electronically Signed by: Chrissy Chan PTA

## 2022-03-22 ENCOUNTER — CLINICAL DOCUMENTATION (OUTPATIENT)
Dept: PHYSICAL MEDICINE AND REHAB | Age: 42
End: 2022-03-22

## 2022-03-22 NOTE — PROGRESS NOTES
Due to patient's non-compliance to our treatment plan and failing to show up to a mandatory two week follow-up, patient will be dismissed from our practice immediately. Patient will be notified via letter of our informed decision.       Jojo Duong DO  Interventional Pain Management/PM&R   [place]

## 2022-03-30 ENCOUNTER — APPOINTMENT (OUTPATIENT)
Dept: PHYSICAL THERAPY | Age: 42
End: 2022-03-30
Payer: COMMERCIAL

## 2022-06-29 ENCOUNTER — OFFICE VISIT (OUTPATIENT)
Dept: FAMILY MEDICINE CLINIC | Age: 42
End: 2022-06-29
Payer: COMMERCIAL

## 2022-06-29 VITALS
BODY MASS INDEX: 33.15 KG/M2 | SYSTOLIC BLOOD PRESSURE: 136 MMHG | RESPIRATION RATE: 16 BRPM | WEIGHT: 231 LBS | DIASTOLIC BLOOD PRESSURE: 88 MMHG | HEART RATE: 88 BPM

## 2022-06-29 DIAGNOSIS — G47.00 INSOMNIA, UNSPECIFIED TYPE: Primary | ICD-10-CM

## 2022-06-29 PROCEDURE — 99213 OFFICE O/P EST LOW 20 MIN: CPT | Performed by: NURSE PRACTITIONER

## 2022-06-29 RX ORDER — TADALAFIL 10 MG/1
10 TABLET ORAL DAILY PRN
Qty: 30 TABLET | Refills: 1 | Status: SHIPPED | OUTPATIENT
Start: 2022-06-29

## 2022-06-29 RX ORDER — ZOLPIDEM TARTRATE 5 MG/1
5 TABLET ORAL NIGHTLY PRN
Qty: 30 TABLET | Refills: 0 | Status: SHIPPED | OUTPATIENT
Start: 2022-06-29 | End: 2022-07-20 | Stop reason: DRUGHIGH

## 2022-06-29 RX ORDER — BUPRENORPHINE HYDROCHLORIDE AND NALOXONE HYDROCHLORIDE DIHYDRATE 8; 2 MG/1; MG/1
TABLET SUBLINGUAL
COMMUNITY
Start: 2022-03-23 | End: 2022-06-29

## 2022-06-29 ASSESSMENT — ENCOUNTER SYMPTOMS
ABDOMINAL PAIN: 0
EYES NEGATIVE: 1
BLOOD IN STOOL: 0
SHORTNESS OF BREATH: 0
CHEST TIGHTNESS: 0

## 2022-06-29 NOTE — PROGRESS NOTES
Chief Complaint   Patient presents with    Insomnia     C/O irregular sleeping. Having a hard time going to sleep and staying asleep x 3 months. Worse in the last couple weeks. SUBJECTIVE     Hiram Score is a 37 y.o.male      Pt complains of difficulty sleeping. States he has trouble falling and staying asleep for the last 3 months but worsening over the last few weeks. Has tried melatonin and benadryl without any relief. No snoring that he is aware of. Anxiety and depression are stable lately. Has not needed the vistaril as much lately. Thinks he was on ambien in the past and tolerated it. States this was likely in his teens but has not needed it in years. Pain has been stable lately. Not currently following with pain management. Nerve burn is helping. Was on suboxone for a very short time but it made him ill. Review of Systems   Constitutional: Negative for chills, fatigue, fever and unexpected weight change. HENT: Negative. Eyes: Negative. Respiratory: Negative for chest tightness and shortness of breath. Cardiovascular: Negative for chest pain, palpitations and leg swelling. Gastrointestinal: Negative for abdominal pain and blood in stool. Genitourinary: Negative for dysuria. Musculoskeletal: Negative for joint swelling. Skin: Negative for rash. Neurological: Negative for dizziness. Psychiatric/Behavioral: Positive for sleep disturbance. All other systems reviewed and are negative. OBJECTIVE     /88   Pulse 88   Resp 16   Wt 231 lb (104.8 kg)   BMI 33.15 kg/m²     Physical Exam  Vitals and nursing note reviewed. Constitutional:       Appearance: He is well-developed. HENT:      Head: Normocephalic and atraumatic. Right Ear: External ear normal.      Left Ear: External ear normal.      Nose: Nose normal.   Eyes:      Conjunctiva/sclera: Conjunctivae normal.      Pupils: Pupils are equal, round, and reactive to light.    Cardiovascular: Rate and Rhythm: Normal rate and regular rhythm. Pulmonary:      Effort: Pulmonary effort is normal.      Breath sounds: Normal breath sounds. Abdominal:      General: Bowel sounds are normal.      Palpations: Abdomen is soft. Musculoskeletal:         General: Normal range of motion. Cervical back: Normal range of motion and neck supple. Skin:     General: Skin is warm and dry. Neurological:      Mental Status: He is alert and oriented to person, place, and time. Deep Tendon Reflexes: Reflexes are normal and symmetric. Psychiatric:         Behavior: Behavior normal.         Thought Content: Thought content normal.         Judgment: Judgment normal.           No results found for this visit on 06/29/22. ASSESSMENT       Diagnosis Orders   1. Insomnia, unspecified type  zolpidem (AMBIEN) 5 MG tablet       PLAN     Requested Prescriptions     Signed Prescriptions Disp Refills    tadalafil (CIALIS) 10 MG tablet 30 tablet 1     Sig: Take 1 tablet by mouth daily as needed for Erectile Dysfunction    zolpidem (AMBIEN) 5 MG tablet 30 tablet 0     Sig: Take 1 tablet by mouth nightly as needed for Sleep for up to 30 days. Wife was on trazodone in the past and did not do well with this. Patient is hesitant to take it as he is afraid of the side effects. Ambien sent to pharmacy  Refill Cialis sent  Pt to call update in 2-4 weeks   Follow up in 3 months            Electronically signed by Basil Romberg, APRN - CNP on 6/29/2022 at 4:29 PM     Controlled Substance Monitoring:    Acute and Chronic Pain Monitoring:   RX Monitoring 6/29/2022   Periodic Controlled Substance Monitoring No signs of potential drug abuse or diversion identified.

## 2022-06-29 NOTE — PATIENT INSTRUCTIONS
Patient Education        zolpidem  Pronunciation: zole PI dem  Brand: Ambien, Ambien CR, Edluar, Intermezzo, Zolpimist  What is the most important information I should know about zolpidem? Never use zolpidem in larger amounts, or for longer than prescribed. Some people using this medicine have engaged in activity while not fully awake and later had no memory of it. If this happens to you, stop taking zolpidem and call your doctor right away. Serious injury or death could occur if you walk or drive while you are notfully awake. Do not take zolpidem if you have consumed alcohol during the day or just before bed. What is zolpidem? Zolpidem is used to treat insomnia. The immediate-release forms of zolpidem are Ambien, Intermezzo, Edluar, and Zolpimist, which are used to help you fall asleep. The extended-release form of zolpidem is Ambien CR, which has a first layer that dissolves quickly to help you fall asleep, and asecond layer that dissolves slowly to help you stay asleep. Ambien, Edluar, and Zolpimist are used to help you fall asleep when you first go to bed. Intermezzo, is used to help you fall back to sleep if you wake up in the middle of thenight and then have trouble sleeping. Your doctor will determine which form of zolpidem is best for you. Zolpidem may also be used for purposes not listed in this medication guide. What should I discuss with my healthcare provider before taking zolpidem? You should not use zolpidem if you are allergic to it, or if you have ever taken sleep medicine and engaged in activity you later don't remember. Zolpidemtablets may contain lactose. Use caution if you are sensitive to lactose. Zolpidem is not approved for use by anyone younger than 25years old.   Tell your doctor if you have ever had:   liver or kidney disease;   a breathing disorder;   depression, mental illness, or suicidal thoughts;   drug or alcohol addiction;   sleep apnea (breathing stops during sleep); or   myasthenia gravis. Taking zolpidem in the last 3 months of pregnancy may cause drowsiness or breathing problems in your . It may not be safe to breast-feed while using this medicine. Ask your doctorabout any risk. The sedative effects of zolpidem may be stronger in older adults. Accidental falls are common in elderly patients who take sedatives. Use caution to avoidfalling or accidental injury while you are taking this medicine. How should I take zolpidem? The recommended doses of zolpidem are not the same in men and women, and this drug is not approved for use in children. Follow the directions on your prescription label and read all medication guides. Never use zolpidem in larger amounts, or for longer than prescribed. Tell your doctor if you feel an increased urge to take more of this medicine. Zolpidem may be habit-forming. Misuse can cause addiction, overdose, or death. Selling or giving away this medicine is against the law. Read and carefully follow any Instructions for Use provided with your medicine. Ask your doctor or pharmacist if you do not understand these instructions. Zolpidem will make you fall asleep. Never take Ambien, Edluar, or Zolpimist if you do not have a full 7 to 8 hours to sleep before being active again. Do not take Intermezzo for middle-of-the-night insomnia unless you have 4 hours of sleep time leftbefore being active. Zolpidem is for short-term use only. Do not take zolpidem for longer than 5 weeks without your doctor's advice. Call your doctor if your symptoms do not improve after 7 to 10 days oftreatment, or if they get worse. Do not stop using zolpidem suddenly after long-term use, or you could have unpleasant withdrawal symptoms. Ask your doctor how tosafely stop using this medicine. Store at room temperature away from moisture and heat. Do not freeze. Keep the medication in a place where others cannot get to it.  Keep the Zolpimist bottle upright when not in use. Insomnia symptoms may also return after you stop taking zolpidem, and may be even worse than before. Tell your doctor if you still have worsened insomniaafter the first few nights without taking zolpidem. What happens if I miss a dose? Since zolpidem is taken only at bedtime if needed, you are not likely to miss a dose. Never take this medication if you do not have 7 to 8 hours to sleep before being active again. Do not take two doses at one time. What happens if I overdose? Seek emergency medical attention or call the Poison Help line at 1-504.403.6186. An overdose of zolpidem can be fatal, especially when it is taken together with other medications that can cause drowsiness. Overdose symptoms may include severe drowsiness, confusion, shallow breathing,feeling light-headed, fainting, or coma. What should I avoid while taking zolpidem? Avoid taking zolpidem during travel, such as to sleep on an airplane. You may be awakened before the effects of the medicine have worn off. Amnesia (forgetfulness) is more common if you do not get a full 7 to 8 hours of sleepafter taking zolpidem. Do not take this medicine if you have consumed alcohol during the day or just before bed. Dangerous side effects or death could occur. Avoid taking zolpidem with food or right after eating a meal. This will make itharder for your body to absorb the medicine. Avoid driving or hazardous activity until you know how this medicine will affect you. You may still feel sleepy the morning after taking zolpidem, and your reactions could be impaired. Wait until you are fully awake before you drive, operate machinery,  an airplane, or do anything that requiresyou to be awake and alert. What are the possible side effects of zolpidem? Zolpidem may cause a severe allergic reaction.  Get emergency medical help if you have signs of an allergic reaction: hives; difficult breathing; nausea and vomiting; swelling of your face, lips,tongue, or throat. Some people using this medicine have engaged in activity while not fully awake and later had no memory of it. This may include walking, driving, or making phone calls. If this happens toyou, stop taking zolpidem and call your doctor right away. Serious injury or death could occur if you walk or drive while you are notfully awake. Call your doctor at once if you have:   anxiety, depression, aggression, agitation;   confusion, hallucinations (hearing or seeing things);   memory problems, unusual thoughts or behavior;   thoughts of hurting yourself; or   feeling like you might pass out. Common side effects may include:   daytime drowsiness, dizziness, feeling \"drugged\" or light-headed;   headache;   diarrhea; or   feeling tired. This is not a complete list of side effects and others may occur. Call your doctor for medical advice about side effects. You may report side effects toFDA at 5-398-NED-1924. What other drugs will affect zolpidem? Using zolpidem with other drugs that make you drowsy or slow your breathing can cause dangerous side effects or death. Ask your doctor before using opioid medication, other sleep medicine, a musclerelaxer, or medicine for anxiety or seizures. Many drugs can affect zolpidem, making it less effective or increasing side effects. This includes prescription and over-the-counter medicines, vitamins, and herbal products. Not all possible interactions are listed here. Tell your doctor about all your current medicines and any medicine you startor stop using. Where can I get more information? Your pharmacist can provide more information about zolpidem. Remember, keep this and all other medicines out of the reach of children, never share your medicines with others, and use this medication only for the indication prescribed.    Every effort has been made to ensure that the information provided by [address] is accurate, up-to-date, and complete, but no guarantee is made to that effect. Drug information contained herein may be time sensitive. [employer] information has been compiled for use by healthcare practitioners and consumers in the [place] and therefore [employer] does not warrant that uses outside of the [place] are appropriate, unless specifically indicated otherwise. [facility]'s drug information does not endorse drugs, diagnose patients or recommend therapy. [facility] drug information is an informational resource designed to assist licensed healthcare practitioners in caring for their patients and/or to serve consumers viewing this service as a supplement to, and not a substitute for, the expertise, skill, knowledge and judgment of healthcare practitioners. The absence of a warning for a given drug or drug combination in no way should be construed to indicate that the drug or drug combination is safe, effective or appropriate for any given patient. [facility] does not assume any responsibility for any aspect of healthcare administered with the aid of information [facility] provides. The information contained herein is not intended to cover all possible uses, directions, precautions, warnings, drug interactions, allergic reactions, or adverse effects. If you have questions about the drugs you are taking, check with yourdoctor, nurse or pharmacist.  Copyright 6303-1776 [address]: 13.01. Revision date: 5/8/2019. Care instructions adapted under license by [facility] ([facility]). If you have questions about a medical condition or this instruction, always ask your healthcare professional. [place] [address] any warranty or liability for your use of this information.

## 2022-07-20 ENCOUNTER — OFFICE VISIT (OUTPATIENT)
Dept: FAMILY MEDICINE CLINIC | Age: 42
End: 2022-07-20
Payer: COMMERCIAL

## 2022-07-20 VITALS
RESPIRATION RATE: 16 BRPM | TEMPERATURE: 98.3 F | HEART RATE: 80 BPM | BODY MASS INDEX: 33.17 KG/M2 | WEIGHT: 231.2 LBS | SYSTOLIC BLOOD PRESSURE: 126 MMHG | DIASTOLIC BLOOD PRESSURE: 78 MMHG

## 2022-07-20 DIAGNOSIS — G47.00 INSOMNIA, UNSPECIFIED TYPE: ICD-10-CM

## 2022-07-20 DIAGNOSIS — M25.522 LEFT ELBOW PAIN: ICD-10-CM

## 2022-07-20 DIAGNOSIS — L02.215 ABSCESS OF PERINEUM: ICD-10-CM

## 2022-07-20 DIAGNOSIS — M53.3 COCCYX PAIN: Primary | ICD-10-CM

## 2022-07-20 PROCEDURE — 99214 OFFICE O/P EST MOD 30 MIN: CPT | Performed by: NURSE PRACTITIONER

## 2022-07-20 RX ORDER — CEPHALEXIN 500 MG/1
500 CAPSULE ORAL 3 TIMES DAILY
Qty: 30 CAPSULE | Refills: 0 | Status: SHIPPED | OUTPATIENT
Start: 2022-07-20 | End: 2022-07-30

## 2022-07-20 RX ORDER — ZOLPIDEM TARTRATE 10 MG/1
10 TABLET ORAL NIGHTLY PRN
Qty: 30 TABLET | Refills: 0 | Status: SHIPPED | OUTPATIENT
Start: 2022-07-20 | End: 2022-08-18

## 2022-07-20 NOTE — PROGRESS NOTES
Chief Complaint   Patient presents with    Elbow Pain     Pt is having issues with his right elbow and would like to discuss/have it looked at. Insomnia     Pt would like to discuss his sleeping medication. Skin Problem     Pt is also c/o an issue with his groin and would like to discuss. SUBJECTIVE     Mayela Michaud is a 40 y.o.male      Pt complains of right elbow pain starting about 1 month ago. The pain seems to be getting worse and he has weakness in his hand. Pain radiates to his shoulder. Has been wearing a compression sleeve and taking tylenol without relief. Pt complains of tailbone pain for \"a long time\" clarifies this as a few months. Hurts to sit down. Unsure if he hit the area. He uses pads to sit on to cushion. Pt complains of a mass behind his scrotum starting 3 days ago. The area is painful. Pt states his sleep is not improved at all since starting ambien 5 mg. He denies any side effects. Review of Systems   Constitutional:  Negative for chills, fatigue, fever and unexpected weight change. HENT: Negative. Eyes: Negative. Respiratory:  Negative for chest tightness and shortness of breath. Cardiovascular:  Negative for chest pain, palpitations and leg swelling. Gastrointestinal:  Negative for abdominal pain and blood in stool. Genitourinary:  Negative for dysuria. Musculoskeletal:  Positive for arthralgias (right elbow). Negative for joint swelling. Tailbone   Skin:  Negative for rash. Soreness behind scrotum   Neurological:  Positive for weakness (right hand). Negative for dizziness. Psychiatric/Behavioral:  Positive for sleep disturbance. All other systems reviewed and are negative. OBJECTIVE     /78   Pulse 80   Temp 98.3 °F (36.8 °C) (Oral)   Resp 16   Wt 231 lb 3.2 oz (104.9 kg)   BMI 33.17 kg/m²     Physical Exam  Vitals and nursing note reviewed. Constitutional:       Appearance: He is well-developed.    HENT: Head: Normocephalic and atraumatic. Right Ear: External ear normal.      Left Ear: External ear normal.      Nose: Nose normal.   Eyes:      Conjunctiva/sclera: Conjunctivae normal.      Pupils: Pupils are equal, round, and reactive to light. Cardiovascular:      Rate and Rhythm: Normal rate and regular rhythm. Pulmonary:      Effort: Pulmonary effort is normal.      Breath sounds: Normal breath sounds. Abdominal:      General: Bowel sounds are normal.      Palpations: Abdomen is soft. Musculoskeletal:      Right elbow: Decreased range of motion. Tenderness present. Cervical back: Normal range of motion and neck supple. Skin:     General: Skin is warm and dry. Findings: Abscess (behind scrotum. Mild erythema and edema.) present. Neurological:      Mental Status: He is alert and oriented to person, place, and time. Deep Tendon Reflexes: Reflexes are normal and symmetric. Psychiatric:         Behavior: Behavior normal.         Thought Content: Thought content normal.         Judgment: Judgment normal.         No results found for this visit on 07/20/22. ASSESSMENT       Diagnosis Orders   1. Coccyx pain  XR SACRUM COCCYX (MIN 2 VIEWS)      2. Left elbow pain  XR ELBOW RIGHT (MIN 3 VIEWS)      3. Insomnia, unspecified type  zolpidem (AMBIEN) 10 MG tablet      4. Abscess of perineum            PLAN     Requested Prescriptions     Signed Prescriptions Disp Refills    zolpidem (AMBIEN) 10 MG tablet 30 tablet 0     Sig: Take 1 tablet by mouth nightly as needed for Sleep for up to 30 days. cephALEXin (KEFLEX) 500 MG capsule 30 capsule 0     Sig: Take 1 capsule by mouth in the morning and 1 capsule at noon and 1 capsule before bedtime. Do all this for 10 days.          Orders Placed This Encounter   Procedures    XR ELBOW RIGHT (MIN 3 VIEWS)     Standing Status:   Future     Standing Expiration Date:   7/20/2023     Order Specific Question:   Reason for exam:     Answer: worsening left elbow pain. no known injury    XR SACRUM COCCYX (MIN 2 VIEWS)     Standing Status:   Future     Standing Expiration Date:   7/20/2023     Order Specific Question:   Reason for exam:     Answer:   tailbone pain for a few months. unsure if he fell on it. After discussion with patient, will increase ambien to 10 mg  Xray elbow and coccyx  Keflex sent to pharmacy  Discussed referral to ortho versus PT. Will await xray.   Tylenol as needed  Follow up in 4 weeks            Electronically signed by MATY Brewster CNP on 7/24/2022 at 2:54 PM

## 2022-07-24 ASSESSMENT — ENCOUNTER SYMPTOMS
EYES NEGATIVE: 1
BLOOD IN STOOL: 0
CHEST TIGHTNESS: 0
SHORTNESS OF BREATH: 0
ABDOMINAL PAIN: 0

## 2022-07-25 ENCOUNTER — TELEPHONE (OUTPATIENT)
Dept: FAMILY MEDICINE CLINIC | Age: 42
End: 2022-07-25

## 2022-07-25 DIAGNOSIS — M25.521 RIGHT ELBOW PAIN: Primary | ICD-10-CM

## 2022-07-25 NOTE — TELEPHONE ENCOUNTER
Patient seen in office on 7/20/22 and given order for right elbow xray. Patient calls because dx code is for left elbow pain and that is not correct. Order corrected and TS notified. Pt will  later today.

## 2022-08-17 DIAGNOSIS — G47.00 INSOMNIA, UNSPECIFIED TYPE: ICD-10-CM

## 2022-08-18 RX ORDER — BUPROPION HYDROCHLORIDE 300 MG/1
TABLET ORAL
Qty: 90 TABLET | Refills: 1 | Status: SHIPPED | OUTPATIENT
Start: 2022-08-18

## 2022-08-18 RX ORDER — ZOLPIDEM TARTRATE 10 MG/1
10 TABLET ORAL NIGHTLY PRN
Qty: 30 TABLET | Refills: 0 | Status: SHIPPED | OUTPATIENT
Start: 2022-08-18 | End: 2022-09-20 | Stop reason: SDUPTHER

## 2022-08-18 NOTE — TELEPHONE ENCOUNTER
Meds sent. TS    Controlled Substance Monitoring:    Acute and Chronic Pain Monitoring:   RX Monitoring 8/18/2022   Periodic Controlled Substance Monitoring No signs of potential drug abuse or diversion identified.

## 2022-08-18 NOTE — TELEPHONE ENCOUNTER
This medication refill is regarding a electronic request.  Refill requested by  [address] . Requested Prescriptions     Pending Prescriptions Disp Refills    zolpidem (AMBIEN) 10 MG tablet [Pharmacy Med Name: ZOLPIDEM TARTRATE 10 MG TABLET] 30 tablet 0     Sig: TAKE 1 TABLET BY MOUTH NIGHTLY AS NEEDED FOR SLEEP FOR UP TO 30 DAYS. buPROPion (WELLBUTRIN XL) 300 MG extended release tablet [Pharmacy Med Name: BUPROPION HCL  MG TABLET] 90 tablet 1     Sig: TAKE 1 TABLET BY MOUTH EVERY DAY IN THE MORNING     Date of last visit: 7/20/2022   Date of next visit: None  Date of last refill:    -Zolpidem 7/20/22  #30/0   -Bupropion 1/10/22 #30/3    Rx verified, ordered and set to EP.

## 2022-09-20 DIAGNOSIS — G47.00 INSOMNIA, UNSPECIFIED TYPE: ICD-10-CM

## 2022-09-20 RX ORDER — ZOLPIDEM TARTRATE 10 MG/1
10 TABLET ORAL NIGHTLY PRN
Qty: 30 TABLET | Refills: 0 | Status: SHIPPED | OUTPATIENT
Start: 2022-09-20 | End: 2022-10-19

## 2022-09-20 NOTE — TELEPHONE ENCOUNTER
This medication refill is regarding a telephone request.  Refill requested by patient. Requested Prescriptions     Pending Prescriptions Disp Refills    zolpidem (AMBIEN) 10 MG tablet 30 tablet 0     Sig: Take 1 tablet by mouth nightly as needed for Sleep for up to 30 days. Date of last visit: 7/20/2022   Date of next visit: Visit date not found  Date of last refill: 8/18/22  Pharmacy Name: [address] [place] [address] Wednesday afternoon    Last Lipid Panel:    Lab Results   Component Value Date/Time    CHOL 226 06/17/2020 04:24 PM    TRIG 225 06/17/2020 04:24 PM    HDL 46 06/17/2020 04:24 PM    LDLCALC 135 06/17/2020 04:24 PM     Last CMP:   Lab Results   Component Value Date     01/02/2021    K 4.3 01/02/2021     01/02/2021    CO2 25 01/02/2021    BUN 10 01/02/2021    CREATININE 0.9 01/02/2021    GLUCOSE 125 (H) 01/02/2021    CALCIUM 8.7 01/02/2021    PROT 6.1 12/30/2020    LABALBU 3.1 (L) 12/30/2020    BILITOT 0.2 (L) 12/30/2020    ALKPHOS 78 12/30/2020    AST 15 12/30/2020    ALT 20 12/30/2020    LABGLOM >90 01/02/2021       Last Thyroid:    Lab Results   Component Value Date    TSH 2.540 06/17/2020    T4FREE 1.28 06/17/2020     Last Hemoglobin A1C:  No results found for: LABA1C, AVGG    Rx verified, ordered and set to EP.

## 2022-10-19 DIAGNOSIS — G47.00 INSOMNIA, UNSPECIFIED TYPE: ICD-10-CM

## 2022-10-19 RX ORDER — ZOLPIDEM TARTRATE 10 MG/1
10 TABLET ORAL NIGHTLY PRN
Qty: 30 TABLET | Refills: 0 | Status: SHIPPED | OUTPATIENT
Start: 2022-10-19 | End: 2022-11-18

## 2022-10-19 NOTE — LETTER
[address] [address]  Phone: [phone]  Fax: [phone]          October 26, 2022      Quinn Car,    We have been trying to reach you to let you know that your Ambien was sent into the pharmacy for 30 days with no refills. Stacie Strickland also wanted us to let you know that you are overdue for an appointment at this time seen as you were supposed to be seen 4 weeks after increasing your Ambien dose. Stacie Strickland would like to see you in office for an appointment before you need your next refill of Ambien. Please call the office at [phone] to schedule an appointment in the next 30 days at your earliest convenience. If you have any questions or concerns please call our office to let us know.       Sincerely,      The Nursing Staff

## 2022-10-19 NOTE — TELEPHONE ENCOUNTER
Pt is over due for appt as he was supposed to be seen in 4 weeks after increase. Will need an appt before next refill. TS    Controlled Substance Monitoring:    Acute and Chronic Pain Monitoring:   RX Monitoring 10/19/2022   Periodic Controlled Substance Monitoring No signs of potential drug abuse or diversion identified.

## 2022-10-20 NOTE — TELEPHONE ENCOUNTER
1st attempt:    PRAVEEN for pt letting him know that we sent his Ambien into the pharmacy for him. I also let him know that he is due for an appt before his next refill of Ambien. I asked he call the office to schedule an appt.

## 2022-10-24 NOTE — TELEPHONE ENCOUNTER
2nd attempt:    PRAVEEN for pt letting him know that we sent his Ambien into the pharmacy for him. I also let him know that he is due for an appt before his next refill of Ambien. I asked he call the office to schedule an appt.

## 2022-10-26 NOTE — TELEPHONE ENCOUNTER
3rd attempt:    Letter printed and placed in envelope to be mailed to pt letting him know his Ambien was sent in #30/NR and that he is overdue for an appt at this time. I asked he call the office to schedule an appt within the next 30 days at his earliest convenience.

## 2022-11-04 RX ORDER — HYDROXYZINE PAMOATE 50 MG/1
CAPSULE ORAL
Qty: 90 CAPSULE | Refills: 1 | Status: SHIPPED | OUTPATIENT
Start: 2022-11-04

## 2022-11-04 NOTE — TELEPHONE ENCOUNTER
This medication refill is regarding a telephone request. Refill requested by patient. Requested Prescriptions     Pending Prescriptions Disp Refills    hydrOXYzine pamoate (VISTARIL) 50 MG capsule 90 capsule 1     Date of last visit: 7/20/2022   Date of next visit: 11/7/2022  Date of last refill: 10/12/21 #90/1  Pharmacy Name: [id number] [facility],[address]    Rx verified, ordered and set to EP.

## 2022-11-04 NOTE — TELEPHONE ENCOUNTER
----- Message from Damaris Fraga sent at 11/4/2022 10:43 AM EDT -----  Subject: Refill Request    QUESTIONS  Name of Medication? hydrOXYzine (VISTARIL) 50 MG capsule  Patient-reported dosage and instructions? 50 mg TAKE 1 CAPSULE BY MOUTH   THREE TIMES DAILY AS NEEDED FOR ANXIETY  How many days do you have left? 0  Preferred Pharmacy? [id number] Pharmacy phone number (if available)? [phone]  ---------------------------------------------------------------------------  --------------  CALL BACK INFO  What is the best way for the office to contact you? OK to leave message on   voicemail  Preferred Call Back Phone Number? [phone]  ---------------------------------------------------------------------------  --------------  SCRIPT ANSWERS  Relationship to Patient?  Self

## 2022-11-29 RX ORDER — HYDROXYZINE PAMOATE 50 MG/1
CAPSULE ORAL
Qty: 90 CAPSULE | Refills: 1 | OUTPATIENT
Start: 2022-11-29

## 2022-11-29 NOTE — TELEPHONE ENCOUNTER
Request sent from [facility] pharmacy for refill of hydroxyzine 50 mg tid prn. This request refused as pt should have another refill remaining.

## 2023-01-25 NOTE — TELEPHONE ENCOUNTER
This medication refill is regarding a electronic request. Refill requested by  [address] . Requested Prescriptions     Pending Prescriptions Disp Refills    hydrOXYzine pamoate (VISTARIL) 50 MG capsule [Pharmacy Med Name: HYDROXYZINE JAXON 50 MG CAP] 90 capsule 1     Sig: TAKE 1 CAPSULE BY MOUTH THREE TIMES DAILY AS NEEDED FOR ANXIETY. Date of last visit: 7/20/2022   Date of next visit: None  Date of last refill: 11/4/22 #90/1    Rx verified, ordered and set to EP.

## 2023-01-26 RX ORDER — HYDROXYZINE PAMOATE 50 MG/1
CAPSULE ORAL
Qty: 90 CAPSULE | Refills: 1 | Status: SHIPPED | OUTPATIENT
Start: 2023-01-26

## 2023-04-25 RX ORDER — HYDROXYZINE PAMOATE 50 MG/1
CAPSULE ORAL
Qty: 270 CAPSULE | Refills: 0 | Status: SHIPPED | OUTPATIENT
Start: 2023-04-25

## 2023-04-25 NOTE — TELEPHONE ENCOUNTER
Incoming fax received from [facility] pharmacy requesting 90 day supply of pt's hydroxyzine ankush 50 mg tid prn instead of the 30 day supply recently sent. Med verified. Order pended.

## 2024-02-27 NOTE — TELEPHONE ENCOUNTER
----- Message from Wilner Alarcon MA sent at 2/26/2024  3:56 PM CST -----    ----- Message -----  From: Anne Khan  Sent: 2/26/2024   3:04 PM CST  To: Shanda Benitez Staff    Type:  Needs Medical Advice    Who Called: [facility]     Would the patient rather a call back or a response via [facility]? Call   Best Call Back Number: [phone]  Additional Information: they are requesting to get the referrals for bend and [facility] sent to them by fax to [phone]           OARRS reviewed. UDS: Inconsistent Mitragynine. Last seen: 5/6/2021.  Follow-up:   Future Appointments   Date Time Provider Nick Naranjo   7/29/2021  8:00 AM DO FAUSTO Polanco [facility] [place] [address]

## 2025-06-28 NOTE — TELEPHONE ENCOUNTER
Subjective:      CC:  presents with Dysuria            Dysuria   This is a new problem. The current episode started in the past 3  days. The problem occurs every urination. The problem has been unchanged. The quality of the pain is described as burning. There has been no fever. Pt is not sexually active. There is no history of pyelonephritis. Associated symptoms include frequency and urgency. Pertinent negatives include no chills, discharge, flank pain, nausea or vomiting. Pt has tried nothing for the symptoms. There is no history of recurrent UTIs.     Social History     Socioeconomic History    Marital status:      Spouse name: Not on file    Number of children: Not on file    Years of education: Not on file    Highest education level: Not on file   Occupational History    Not on file   Tobacco Use    Smoking status: Former     Types: Cigarettes    Smokeless tobacco: Never   Vaping Use    Vaping status: Never Used   Substance and Sexual Activity    Alcohol use: No    Drug use: No    Sexual activity: Not Currently     Partners: Male   Other Topics Concern    Not on file   Social History Narrative    Not on file     Social Drivers of Health     Financial Resource Strain: Not on file   Food Insecurity: Not on file   Transportation Needs: Not on file   Physical Activity: Not on file   Stress: Not on file   Social Connections: Not on file   Intimate Partner Violence: Not At Risk (12/12/2023)    Received from [facility]    Intimate Partner Violence      Are you in a relationship with someone who hurts you emotionally and/or physically?: No   Housing Stability: Not on file         No family history on file.      Allergies[1]        Medications Ordered Prior to Encounter[2]    Review of Systems   Constitutional: Negative for chills.   Respiratory: Negative for shortness of breath.    Cardiovascular: Negative for chest pain.   Gastrointestinal: Negative for  OARRS reviewed. UDS: No Updated UDS. Last seen: 7/29/2021.  Follow-up:   Future Appointments   Date Time Provider Nick Naranjo   10/21/2021  8:00 AM DO FAUSTO Ricardo [facility] "nausea, vomiting and abdominal pain.   Genitourinary: Positive for dysuria, urgency and frequency. Negative for flank pain.   Skin: Negative for rash.   Neurological: Negative for dizziness and headaches.   All other systems reviewed and are negative.         Objective:      /82   Pulse 74   Temp 36.6 °C (97.8 °F) (Temporal)   Resp 16   Ht 1.626 m (5' 4\")   Wt 68 kg (150 lb)   SpO2 97%       Physical Exam   Constitutional: pt is oriented to person, place, and time. Pt appears well-developed and well-nourished. No distress.   HENT:   Head: Normocephalic and atraumatic.   Mouth/Throat: Mucous membranes are normal.   Eyes: Conjunctivae and EOM are normal. Pupils are equal, round, and reactive to light. Right eye exhibits no discharge. Left eye exhibits no discharge. No scleral icterus.   Neck: Normal range of motion. Neck supple.   Cardiovascular: Normal rate, regular rhythm, normal heart sounds and intact distal pulses.    No murmur heard.  Pulmonary/Chest: Effort normal and breath sounds normal. No respiratory distress. Pt has no wheezes,  rales.   Abdominal: Bowel sounds are normal. Pt exhibits no distension and no mass. There is no tenderness. There is no rebound, no guarding and no CVA tenderness.   Neurological: pt is alert and oriented to person, place, and time.   Skin: Skin is warm and dry.   Psychiatric: behavior is normal.   Nursing note and vitals reviewed.           Lab Results   Component Value Date/Time    POCCOLOR yellow 06/28/2025 12:29 PM    POCAPPEAR clear 06/28/2025 12:29 PM    POCLEUKEST neg 06/28/2025 12:29 PM    POCNITRITE neg 06/28/2025 12:29 PM    POCUROBILIGE 0.2 06/28/2025 12:29 PM    POCPROTEIN neg 06/28/2025 12:29 PM    POCURPH 5.5 06/28/2025 12:29 PM    POCBLOOD trace-intact 06/28/2025 12:29 PM    POCSPGRV 1.010 06/28/2025 12:29 PM    POCKETONES neg 06/28/2025 12:29 PM    POCBILIRUBIN neg 06/28/2025 12:29 PM    POCGLUCUA neg 06/28/2025 12:29 PM            Assessment/Plan: "     1. UTI symptoms (Primary)   UA positive only for trace blood    Will start on macrobid per pt request, but if urine cx negative, will discontinue         - POCT Urinalysis  - URINE CULTURE(NEW); Future  - nitrofurantoin (MACROBID) 100 MG Cap; Take 1 Capsule by mouth 2 times a day for 5 days.  Dispense: 10 Capsule; Refill: 0      Differential diagnosis, natural history, supportive care, and indications for immediate follow-up discussed. All questions answered. Patient agrees with the plan of care.     Follow-up as needed if symptoms worsen or fail to improve to PCP, Urgent care or Emergency Room.     I have personally reviewed prior external notes and test results pertinent to today's visit.  I have independently reviewed and interpreted all diagnostics ordered during this urgent care acute visit.              [1]   Allergies  Allergen Reactions    Ibuprofen      Other Reaction(s): Unknown    Sulfa Drugs Rash and Vomiting     Pt has taken sulfa medications with no rxn   [2]   Current Outpatient Medications on File Prior to Visit   Medication Sig Dispense Refill    atorvastatin (LIPITOR) 20 MG Tab TAKE 1 TABLET BY MOUTH TWICE WEEKLY 24 Tablet 3    Psyllium (METAMUCIL FIBER PO) Take  by mouth.      omeprazole (PRILOSEC) 40 MG delayed-release capsule       Cholecalciferol (VITAMIN D3) 2000 units Tab Take  by mouth.      Multiple Vitamins-Minerals (CENTRUM SILVER 50+WOMEN PO) Take  by mouth.      aspirin 81 MG tablet Take 81 mg by mouth every day.      conjugated estrogen (PREMARIN) 0.625 MG/GM Cream Insert 0.5 g in vagina every day. 2x weekly      azithromycin (ZITHROMAX) 250 MG Tab  (Patient not taking: Reported on 6/28/2025)      neomycin-polymyxin-dexamethasone (MAXITROL) 3.5-52521-0.1 Suspension ophthalmic suspension  (Patient not taking: Reported on 6/28/2025)      calcium carbonate (OS-JOVANY 500) 500 MG Tab 1 tablet with meals Orally Once a day (Patient not taking: Reported on 6/28/2025)      naproxen (NAPROSYN)  500 MG Tab TAKE 1 TABLET BY MOUTH TWICE DAILY WITH MEALS 60 Tablet 0    D-MANNOSE PO Take  by mouth.       No current facility-administered medications on file prior to visit.

## (undated) DEVICE — NEEDLE HYPO 18GA L1.5IN THN WALL PIVOTING SHLD BVL ORIENTED

## (undated) DEVICE — MARKER,SKIN,WI/RULER AND LABELS: Brand: MEDLINE

## (undated) DEVICE — Z DISCONTINUED USE 2272117 DRAPE SURG 3 QTR N INVASIVE 2 LAYR DISP

## (undated) DEVICE — 6 ML SYRINGE LUER-LOCK TIP: Brand: MONOJECT

## (undated) DEVICE — TOWEL,OR,DSP,ST,BLUE,STD,4/PK,20PK/CS: Brand: MEDLINE

## (undated) DEVICE — GLOVE BIOGEL POWDER FREE SZ 8

## (undated) DEVICE — NEEDLE SPNL 22GA L3.5IN BLK HUB S STL REG WALL FIT STYL W/

## (undated) DEVICE — APPLICATOR MEDICATED 26 CC SOLUTION HI LT ORNG CHLORAPREP

## (undated) DEVICE — SYRINGE MED 3ML CLR PLAS STD N CTRL LUERLOCK TIP DISP

## (undated) DEVICE — APPLICATOR MEDICATED 3 CC SOLUTION CLR STRL CHLORAPREP

## (undated) DEVICE — SYRINGE MED 10ML LUERLOCK TIP W/O SFTY DISP

## (undated) DEVICE — LABEL MED DRUG CUST

## (undated) DEVICE — HYPODERMIC SAFETY NEEDLE: Brand: MAGELLAN

## (undated) DEVICE — GAUZE SPONGES,USP TYPE VII GAUZE, 12 PLY: Brand: CURITY

## (undated) DEVICE — 1840 FOAM BLOCK NEEDLE COUNTER: Brand: DEVON

## (undated) DEVICE — COUNTER NDL 40 COUNT HLD 70 FOAM BLK ADH W/ MAG

## (undated) DEVICE — 3 ML SYRINGE LUER-LOCK TIP: Brand: MONOJECT

## (undated) DEVICE — GAUZE,SPONGE,4"X4",12PLY,STERILE,LF,2'S: Brand: MEDLINE

## (undated) DEVICE — SHEET,DRAPE,3/4,53X77,STERILE: Brand: MEDLINE

## (undated) DEVICE — NEEDLE SYR 18GA L1.5IN RED PLAS HUB S STL BLNT FILL W/O